# Patient Record
Sex: FEMALE | Race: BLACK OR AFRICAN AMERICAN | Employment: OTHER | URBAN - METROPOLITAN AREA
[De-identification: names, ages, dates, MRNs, and addresses within clinical notes are randomized per-mention and may not be internally consistent; named-entity substitution may affect disease eponyms.]

---

## 2017-05-25 PROBLEM — S93.402A SPRAIN OF LEFT ANKLE: Status: ACTIVE | Noted: 2017-05-25

## 2017-11-10 PROBLEM — G44.209 MUSCLE CONTRACTION HEADACHE: Status: ACTIVE | Noted: 2017-11-10

## 2018-03-23 ENCOUNTER — OFFICE VISIT (OUTPATIENT)
Dept: FAMILY MEDICINE CLINIC | Age: 63
End: 2018-03-23
Payer: MEDICARE

## 2018-03-23 VITALS
TEMPERATURE: 97.7 F | HEIGHT: 65 IN | WEIGHT: 101 LBS | OXYGEN SATURATION: 99 % | DIASTOLIC BLOOD PRESSURE: 78 MMHG | BODY MASS INDEX: 16.83 KG/M2 | SYSTOLIC BLOOD PRESSURE: 110 MMHG | HEART RATE: 70 BPM

## 2018-03-23 DIAGNOSIS — G47.00 INSOMNIA, UNSPECIFIED TYPE: ICD-10-CM

## 2018-03-23 DIAGNOSIS — Z63.4 BEREAVEMENT: ICD-10-CM

## 2018-03-23 DIAGNOSIS — E03.9 HYPOTHYROIDISM, UNSPECIFIED TYPE: ICD-10-CM

## 2018-03-23 DIAGNOSIS — J45.30 MILD PERSISTENT ASTHMA WITHOUT COMPLICATION: Primary | Chronic | ICD-10-CM

## 2018-03-23 PROCEDURE — G8599 NO ASA/ANTIPLAT THER USE RNG: HCPCS | Performed by: FAMILY MEDICINE

## 2018-03-23 PROCEDURE — 3017F COLORECTAL CA SCREEN DOC REV: CPT | Performed by: FAMILY MEDICINE

## 2018-03-23 PROCEDURE — G8419 CALC BMI OUT NRM PARAM NOF/U: HCPCS | Performed by: FAMILY MEDICINE

## 2018-03-23 PROCEDURE — G8427 DOCREV CUR MEDS BY ELIG CLIN: HCPCS | Performed by: FAMILY MEDICINE

## 2018-03-23 PROCEDURE — 99213 OFFICE O/P EST LOW 20 MIN: CPT | Performed by: FAMILY MEDICINE

## 2018-03-23 PROCEDURE — 1036F TOBACCO NON-USER: CPT | Performed by: FAMILY MEDICINE

## 2018-03-23 PROCEDURE — 3014F SCREEN MAMMO DOC REV: CPT | Performed by: FAMILY MEDICINE

## 2018-03-23 PROCEDURE — G8482 FLU IMMUNIZE ORDER/ADMIN: HCPCS | Performed by: FAMILY MEDICINE

## 2018-03-23 RX ORDER — UREA 10 %
1 LOTION (ML) TOPICAL NIGHTLY PRN
Qty: 30 TABLET | Refills: 1 | Status: SHIPPED | OUTPATIENT
Start: 2018-03-23 | End: 2018-05-26 | Stop reason: SDUPTHER

## 2018-03-23 RX ORDER — FLUTICASONE PROPIONATE 110 UG/1
1 AEROSOL, METERED RESPIRATORY (INHALATION) 2 TIMES DAILY
Qty: 1 INHALER | Refills: 3 | Status: SHIPPED | OUTPATIENT
Start: 2018-03-23 | End: 2018-07-24 | Stop reason: SDUPTHER

## 2018-03-23 SDOH — SOCIAL STABILITY - SOCIAL INSECURITY: DISSAPEARANCE AND DEATH OF FAMILY MEMBER: Z63.4

## 2018-03-23 ASSESSMENT — ENCOUNTER SYMPTOMS
COUGH: 0
WHEEZING: 0
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
## 2018-04-09 DIAGNOSIS — E03.0 CONGENITAL HYPOTHYROIDISM WITH DIFFUSE GOITER: ICD-10-CM

## 2018-04-10 RX ORDER — LEVOTHYROXINE SODIUM 0.05 MG/1
TABLET ORAL
Qty: 90 TABLET | Refills: 1 | Status: SHIPPED | OUTPATIENT
Start: 2018-04-10 | End: 2018-11-16 | Stop reason: SDUPTHER

## 2018-05-26 DIAGNOSIS — G47.00 INSOMNIA, UNSPECIFIED TYPE: ICD-10-CM

## 2018-05-29 RX ORDER — UREA 10 %
1 LOTION (ML) TOPICAL NIGHTLY PRN
Qty: 30 TABLET | Refills: 1 | Status: SHIPPED | OUTPATIENT
Start: 2018-05-29 | End: 2018-11-16 | Stop reason: SDUPTHER

## 2018-07-24 ENCOUNTER — OFFICE VISIT (OUTPATIENT)
Dept: FAMILY MEDICINE CLINIC | Age: 63
End: 2018-07-24
Payer: MEDICARE

## 2018-07-24 VITALS
SYSTOLIC BLOOD PRESSURE: 110 MMHG | HEIGHT: 64 IN | HEART RATE: 70 BPM | DIASTOLIC BLOOD PRESSURE: 60 MMHG | BODY MASS INDEX: 18.27 KG/M2 | OXYGEN SATURATION: 99 % | WEIGHT: 107 LBS

## 2018-07-24 DIAGNOSIS — Z12.31 ENCOUNTER FOR SCREENING MAMMOGRAM FOR BREAST CANCER: ICD-10-CM

## 2018-07-24 DIAGNOSIS — G43.109 MIGRAINE WITH AURA AND WITHOUT STATUS MIGRAINOSUS, NOT INTRACTABLE: Chronic | ICD-10-CM

## 2018-07-24 DIAGNOSIS — G47.00 INSOMNIA, UNSPECIFIED TYPE: Primary | ICD-10-CM

## 2018-07-24 DIAGNOSIS — F32.A DEPRESSION, UNSPECIFIED DEPRESSION TYPE: ICD-10-CM

## 2018-07-24 DIAGNOSIS — J45.30 MILD PERSISTENT ASTHMA WITHOUT COMPLICATION: Chronic | ICD-10-CM

## 2018-07-24 PROCEDURE — 3017F COLORECTAL CA SCREEN DOC REV: CPT | Performed by: FAMILY MEDICINE

## 2018-07-24 PROCEDURE — 99213 OFFICE O/P EST LOW 20 MIN: CPT | Performed by: FAMILY MEDICINE

## 2018-07-24 PROCEDURE — G8427 DOCREV CUR MEDS BY ELIG CLIN: HCPCS | Performed by: FAMILY MEDICINE

## 2018-07-24 PROCEDURE — G8599 NO ASA/ANTIPLAT THER USE RNG: HCPCS | Performed by: FAMILY MEDICINE

## 2018-07-24 PROCEDURE — 1036F TOBACCO NON-USER: CPT | Performed by: FAMILY MEDICINE

## 2018-07-24 PROCEDURE — G8419 CALC BMI OUT NRM PARAM NOF/U: HCPCS | Performed by: FAMILY MEDICINE

## 2018-07-24 RX ORDER — MIRTAZAPINE 7.5 MG/1
7.5 TABLET, FILM COATED ORAL NIGHTLY
Qty: 30 TABLET | Refills: 2 | Status: SHIPPED | OUTPATIENT
Start: 2018-07-24 | End: 2018-10-13 | Stop reason: SDUPTHER

## 2018-07-24 RX ORDER — FLUTICASONE PROPIONATE 110 UG/1
1 AEROSOL, METERED RESPIRATORY (INHALATION) 2 TIMES DAILY
Qty: 1 INHALER | Refills: 3 | Status: SHIPPED | OUTPATIENT
Start: 2018-07-24 | End: 2019-02-19 | Stop reason: SDUPTHER

## 2018-07-24 RX ORDER — ESCITALOPRAM OXALATE 10 MG/1
TABLET ORAL
Qty: 90 TABLET | Refills: 0 | Status: SHIPPED | OUTPATIENT
Start: 2018-07-24 | End: 2018-11-16 | Stop reason: SDUPTHER

## 2018-07-24 ASSESSMENT — ENCOUNTER SYMPTOMS
WHEEZING: 0
HEMOPTYSIS: 0
SPUTUM PRODUCTION: 0
SHORTNESS OF BREATH: 0
COUGH: 0

## 2018-07-24 NOTE — PROGRESS NOTES
18.37 kg/m²   Physical Exam   Constitutional: She is oriented to person, place, and time. She appears well-developed and well-nourished. HENT:   Head: Normocephalic and atraumatic. Cardiovascular: Normal rate and regular rhythm. Exam reveals no gallop and no friction rub. No murmur heard. Pulmonary/Chest: Effort normal and breath sounds normal. She has no wheezes. She has no rales. Musculoskeletal: She exhibits no edema. Neurological: She is alert and oriented to person, place, and time. Skin: Skin is warm and dry. Assessment/Plan:  Frieda Spencer was seen today for asthma, insomnia and headache. Diagnoses and all orders for this visit:    Insomnia, unspecified type  Trial of remeron  May help with weight as well  -     mirtazapine (REMERON) 7.5 MG tablet; Take 1 tablet by mouth nightly For insomnia    Encounter for screening mammogram for breast cancer  -     GERONIMO DIGITAL SCREEN W CAD BILATERAL; Future    Depression, unspecified depression type    Stable   Refill lexapro  -     escitalopram (LEXAPRO) 10 MG tablet; TAKE 1 TABLET BY MOUTH DAILY    Migraine with aura and without status migrainosus, not intractable  Needs refreral to new neurologist for management of migraines  On tylenol 4 and fioricet  -     escitalopram (LEXAPRO) 10 MG tablet; TAKE 1 TABLET BY MOUTH DAILY  -     [facility]- Shantel MCGREGOR    Mild persistent asthma without complication  stable  -     fluticasone (FLOVENT HFA) 110 MCG/ACT inhaler; Inhale 1 puff into the lungs 2 times daily      Return in about 3 months (around 10/24/2018) for f/u insomnia, asthma. Advised patient to call with any new medication issues. All questions answered.   Call or go to emergency department if symptoms worsen or persist.

## 2018-07-25 ENCOUNTER — TELEPHONE (OUTPATIENT)
Dept: NEUROLOGY | Age: 63
End: 2018-07-25

## 2018-07-25 NOTE — TELEPHONE ENCOUNTER
Called and scheduled patient from referral, 11- @ 8:20a.m. Patient confirmed date and time,  told patient to bring insurance card, photo id and copay is due at the time of appointment. Reminder letter sent with the address, date and time of appt.

## 2018-07-30 ENCOUNTER — HOSPITAL ENCOUNTER (OUTPATIENT)
Dept: GENERAL RADIOLOGY | Age: 63
Discharge: HOME | End: 2018-08-01
Payer: MEDICARE

## 2018-07-30 DIAGNOSIS — Z12.31 ENCOUNTER FOR SCREENING MAMMOGRAM FOR BREAST CANCER: ICD-10-CM

## 2018-07-30 PROCEDURE — 77063 BREAST TOMOSYNTHESIS BI: CPT

## 2018-10-13 DIAGNOSIS — G47.00 INSOMNIA, UNSPECIFIED TYPE: ICD-10-CM

## 2018-10-17 RX ORDER — MIRTAZAPINE 7.5 MG/1
7.5 TABLET, FILM COATED ORAL NIGHTLY
Qty: 30 TABLET | Refills: 2 | Status: SHIPPED | OUTPATIENT
Start: 2018-10-17 | End: 2020-08-28 | Stop reason: SDUPTHER

## 2018-11-16 ENCOUNTER — OFFICE VISIT (OUTPATIENT)
Dept: FAMILY MEDICINE CLINIC | Age: 63
End: 2018-11-16
Payer: MEDICARE

## 2018-11-16 VITALS
HEART RATE: 76 BPM | BODY MASS INDEX: 17.66 KG/M2 | TEMPERATURE: 97.6 F | DIASTOLIC BLOOD PRESSURE: 82 MMHG | HEIGHT: 65 IN | SYSTOLIC BLOOD PRESSURE: 128 MMHG | WEIGHT: 106 LBS | OXYGEN SATURATION: 99 %

## 2018-11-16 DIAGNOSIS — G43.109 MIGRAINE WITH AURA AND WITHOUT STATUS MIGRAINOSUS, NOT INTRACTABLE: Chronic | ICD-10-CM

## 2018-11-16 DIAGNOSIS — F32.A DEPRESSION, UNSPECIFIED DEPRESSION TYPE: ICD-10-CM

## 2018-11-16 DIAGNOSIS — Z23 NEED FOR INFLUENZA VACCINATION: ICD-10-CM

## 2018-11-16 DIAGNOSIS — G47.00 INSOMNIA, UNSPECIFIED TYPE: ICD-10-CM

## 2018-11-16 DIAGNOSIS — R07.9 CHEST PAIN IN ADULT: ICD-10-CM

## 2018-11-16 DIAGNOSIS — M79.10 MYALGIA, UNSPECIFIED SITE: Primary | ICD-10-CM

## 2018-11-16 DIAGNOSIS — E03.0 CONGENITAL HYPOTHYROIDISM WITH DIFFUSE GOITER: ICD-10-CM

## 2018-11-16 PROCEDURE — G0008 ADMIN INFLUENZA VIRUS VAC: HCPCS | Performed by: FAMILY MEDICINE

## 2018-11-16 PROCEDURE — G8427 DOCREV CUR MEDS BY ELIG CLIN: HCPCS | Performed by: FAMILY MEDICINE

## 2018-11-16 PROCEDURE — 1036F TOBACCO NON-USER: CPT | Performed by: FAMILY MEDICINE

## 2018-11-16 PROCEDURE — G8599 NO ASA/ANTIPLAT THER USE RNG: HCPCS | Performed by: FAMILY MEDICINE

## 2018-11-16 PROCEDURE — 93000 ELECTROCARDIOGRAM COMPLETE: CPT | Performed by: FAMILY MEDICINE

## 2018-11-16 PROCEDURE — 90686 IIV4 VACC NO PRSV 0.5 ML IM: CPT | Performed by: FAMILY MEDICINE

## 2018-11-16 PROCEDURE — G8419 CALC BMI OUT NRM PARAM NOF/U: HCPCS | Performed by: FAMILY MEDICINE

## 2018-11-16 PROCEDURE — G8482 FLU IMMUNIZE ORDER/ADMIN: HCPCS | Performed by: FAMILY MEDICINE

## 2018-11-16 PROCEDURE — 3017F COLORECTAL CA SCREEN DOC REV: CPT | Performed by: FAMILY MEDICINE

## 2018-11-16 PROCEDURE — 99213 OFFICE O/P EST LOW 20 MIN: CPT | Performed by: FAMILY MEDICINE

## 2018-11-16 RX ORDER — ALBUTEROL SULFATE 90 UG/1
AEROSOL, METERED RESPIRATORY (INHALATION)
COMMUNITY
Start: 2015-08-13 | End: 2018-11-29

## 2018-11-16 RX ORDER — CYANOCOBALAMIN 1000 UG/ML
INJECTION INTRAMUSCULAR; SUBCUTANEOUS
COMMUNITY

## 2018-11-16 RX ORDER — LEVOTHYROXINE SODIUM 0.05 MG/1
TABLET ORAL
Qty: 90 TABLET | Refills: 1 | Status: SHIPPED | OUTPATIENT
Start: 2018-11-16 | End: 2019-02-19 | Stop reason: SDUPTHER

## 2018-11-16 RX ORDER — ESCITALOPRAM OXALATE 10 MG/1
TABLET ORAL
Qty: 90 TABLET | Refills: 0 | Status: SHIPPED | OUTPATIENT
Start: 2018-11-16 | End: 2019-03-31 | Stop reason: SDUPTHER

## 2018-11-16 RX ORDER — UREA 10 %
1 LOTION (ML) TOPICAL NIGHTLY PRN
Qty: 30 TABLET | Refills: 1 | Status: SHIPPED | OUTPATIENT
Start: 2018-11-16 | End: 2019-02-19
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
## 2018-11-29 ENCOUNTER — OFFICE VISIT (OUTPATIENT)
Dept: NEUROLOGY | Age: 63
End: 2018-11-29
Payer: MEDICARE

## 2018-11-29 VITALS
DIASTOLIC BLOOD PRESSURE: 79 MMHG | HEIGHT: 64 IN | HEART RATE: 79 BPM | RESPIRATION RATE: 12 BRPM | WEIGHT: 104 LBS | OXYGEN SATURATION: 96 % | SYSTOLIC BLOOD PRESSURE: 125 MMHG | TEMPERATURE: 97.3 F | BODY MASS INDEX: 17.75 KG/M2

## 2018-11-29 DIAGNOSIS — G43.009 MIGRAINE WITHOUT AURA AND WITHOUT STATUS MIGRAINOSUS, NOT INTRACTABLE: Primary | ICD-10-CM

## 2018-11-29 PROBLEM — S93.402A SPRAIN OF LEFT ANKLE: Status: RESOLVED | Noted: 2017-05-25 | Resolved: 2018-11-29

## 2018-11-29 PROCEDURE — 99204 OFFICE O/P NEW MOD 45 MIN: CPT | Performed by: NURSE PRACTITIONER

## 2018-11-29 PROCEDURE — G8419 CALC BMI OUT NRM PARAM NOF/U: HCPCS | Performed by: NURSE PRACTITIONER

## 2018-11-29 PROCEDURE — G8482 FLU IMMUNIZE ORDER/ADMIN: HCPCS | Performed by: NURSE PRACTITIONER

## 2018-11-29 PROCEDURE — G8599 NO ASA/ANTIPLAT THER USE RNG: HCPCS | Performed by: NURSE PRACTITIONER

## 2018-11-29 PROCEDURE — 3017F COLORECTAL CA SCREEN DOC REV: CPT | Performed by: NURSE PRACTITIONER

## 2018-11-29 PROCEDURE — G8427 DOCREV CUR MEDS BY ELIG CLIN: HCPCS | Performed by: NURSE PRACTITIONER

## 2018-11-29 PROCEDURE — 1036F TOBACCO NON-USER: CPT | Performed by: NURSE PRACTITIONER
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
## 2018-12-03 DIAGNOSIS — G43.109 MIGRAINE WITH AURA AND WITHOUT STATUS MIGRAINOSUS, NOT INTRACTABLE: Chronic | ICD-10-CM

## 2018-12-03 NOTE — TELEPHONE ENCOUNTER
Requested Prescriptions     Pending Prescriptions Disp Refills    vitamin D ([facility] D3) 1000 units CAPS 30 capsule 9       Next appt is 1/25/2019  Last appt was 11/16/2018

## 2019-01-04 DIAGNOSIS — G43.101 MIGRAINE WITH AURA AND WITH STATUS MIGRAINOSUS, NOT INTRACTABLE: ICD-10-CM

## 2019-01-04 RX ORDER — BUTALBITAL, ASPIRIN, AND CAFFEINE 325; 50; 40 MG/1; MG/1; MG/1
1 CAPSULE ORAL 2 TIMES DAILY PRN
Qty: 60 CAPSULE | Refills: 5 | Status: SHIPPED | OUTPATIENT
Start: 2019-01-04 | End: 2019-08-05 | Stop reason: SDUPTHER

## 2019-02-19 ENCOUNTER — OFFICE VISIT (OUTPATIENT)
Dept: FAMILY MEDICINE CLINIC | Age: 64
End: 2019-02-19
Payer: MEDICARE

## 2019-02-19 ENCOUNTER — HOSPITAL ENCOUNTER (OUTPATIENT)
Age: 64
Discharge: HOME | End: 2019-02-21
Payer: MEDICARE

## 2019-02-19 VITALS
TEMPERATURE: 97.9 F | BODY MASS INDEX: 19.49 KG/M2 | HEART RATE: 68 BPM | SYSTOLIC BLOOD PRESSURE: 126 MMHG | RESPIRATION RATE: 16 BRPM | HEIGHT: 63 IN | DIASTOLIC BLOOD PRESSURE: 70 MMHG | OXYGEN SATURATION: 98 % | WEIGHT: 110 LBS

## 2019-02-19 DIAGNOSIS — E03.0 CONGENITAL HYPOTHYROIDISM WITH DIFFUSE GOITER: ICD-10-CM

## 2019-02-19 DIAGNOSIS — E78.2 MIXED HYPERLIPIDEMIA: ICD-10-CM

## 2019-02-19 DIAGNOSIS — E03.9 HYPOTHYROIDISM, UNSPECIFIED TYPE: ICD-10-CM

## 2019-02-19 DIAGNOSIS — F32.A DEPRESSION, UNSPECIFIED DEPRESSION TYPE: ICD-10-CM

## 2019-02-19 DIAGNOSIS — J45.30 MILD PERSISTENT ASTHMA WITHOUT COMPLICATION: ICD-10-CM

## 2019-02-19 DIAGNOSIS — E03.9 HYPOTHYROIDISM, UNSPECIFIED TYPE: Primary | ICD-10-CM

## 2019-02-19 DIAGNOSIS — G43.109 MIGRAINE WITH AURA AND WITHOUT STATUS MIGRAINOSUS, NOT INTRACTABLE: Chronic | ICD-10-CM

## 2019-02-19 LAB
ALBUMIN SERPL-MCNC: 4.3 G/DL (ref 3.5–5.2)
ALP BLD-CCNC: 75 U/L (ref 35–104)
ALT SERPL-CCNC: 25 U/L (ref 0–32)
ANION GAP SERPL CALCULATED.3IONS-SCNC: 9 MMOL/L (ref 7–16)
AST SERPL-CCNC: 25 U/L (ref 0–31)
BASOPHILS ABSOLUTE: 0 /ΜL
BASOPHILS ABSOLUTE: 0.03 E9/L (ref 0–0.2)
BASOPHILS RELATIVE PERCENT: 0.8 % (ref 0–2)
BASOPHILS RELATIVE PERCENT: 1 %
BILIRUB SERPL-MCNC: <0.2 MG/DL (ref 0–1.2)
BUN BLDV-MCNC: 9 MG/DL (ref 8–23)
CALCIUM SERPL-MCNC: 9.2 MG/DL (ref 8.6–10.2)
CHLORIDE BLD-SCNC: 102 MMOL/L (ref 98–107)
CHOLESTEROL, TOTAL: 204 MG/DL (ref 0–199)
CO2: 30 MMOL/L (ref 22–29)
CREAT SERPL-MCNC: 0.6 MG/DL (ref 0.5–1)
EOSINOPHILS ABSOLUTE: 0.16 E9/L (ref 0.05–0.5)
EOSINOPHILS ABSOLUTE: 100 /ΜL
EOSINOPHILS RELATIVE PERCENT: 2 %
EOSINOPHILS RELATIVE PERCENT: 4.2 % (ref 0–6)
GFR AFRICAN AMERICAN: >60
GFR NON-AFRICAN AMERICAN: >60 ML/MIN/1.73
GLUCOSE BLD-MCNC: 81 MG/DL (ref 74–99)
HCT VFR BLD CALC: 39.1 % (ref 34–48)
HCT VFR BLD CALC: 42.5 % (ref 36–46)
HDLC SERPL-MCNC: 81 MG/DL
HEMOGLOBIN: 12.7 G/DL (ref 11.5–15.5)
HEMOGLOBIN: 14.1 G/DL (ref 12–16)
IMMATURE GRANULOCYTES #: 0.01 E9/L
IMMATURE GRANULOCYTES %: 0.3 % (ref 0–5)
LDL CHOLESTEROL CALCULATED: 110 MG/DL (ref 0–99)
LYMPHOCYTES ABSOLUTE: 1.25 E9/L (ref 1.5–4)
LYMPHOCYTES ABSOLUTE: 1500 /ΜL
LYMPHOCYTES RELATIVE PERCENT: 25 %
LYMPHOCYTES RELATIVE PERCENT: 33.2 % (ref 20–42)
MCH RBC QN AUTO: 27.3 PG (ref 26–35)
MCH RBC QN AUTO: 27.7 PG
MCHC RBC AUTO-ENTMCNC: 32.5 % (ref 32–34.5)
MCHC RBC AUTO-ENTMCNC: 331 G/DL
MCV RBC AUTO: 83.8 FL
MCV RBC AUTO: 83.9 FL (ref 80–99.9)
MONOCYTES ABSOLUTE: 0.41 E9/L (ref 0.1–0.95)
MONOCYTES ABSOLUTE: 400 /ΜL
MONOCYTES RELATIVE PERCENT: 10.9 % (ref 2–12)
MONOCYTES RELATIVE PERCENT: 6 %
NEUTROPHILS ABSOLUTE: 1.91 E9/L (ref 1.8–7.3)
NEUTROPHILS ABSOLUTE: 3900 /ΜL
NEUTROPHILS RELATIVE PERCENT: 50.6 % (ref 43–80)
NEUTROPHILS RELATIVE PERCENT: 66 %
PDW BLD-RTO: 13.6 FL (ref 11.5–15)
PLATELET # BLD: 198 E9/L (ref 130–450)
PLATELET # BLD: 220 K/ΜL
PMV BLD AUTO: 10.8 FL (ref 7–12)
PMV BLD AUTO: 8 FL
POTASSIUM SERPL-SCNC: 4.4 MMOL/L (ref 3.5–5)
RBC # BLD: 4.66 E12/L (ref 3.5–5.5)
RBC # BLD: 5.08 10^6/ΜL
SODIUM BLD-SCNC: 141 MMOL/L (ref 132–146)
TOTAL PROTEIN: 7.5 G/DL (ref 6.4–8.3)
TRIGL SERPL-MCNC: 63 MG/DL (ref 0–149)
TSH SERPL DL<=0.05 MIU/L-ACNC: 3.05 UIU/ML (ref 0.27–4.2)
VLDLC SERPL CALC-MCNC: 13 MG/DL
WBC # BLD: 3.8 E9/L (ref 4.5–11.5)
WBC # BLD: 6 10^3/ML

## 2019-02-19 PROCEDURE — 85025 COMPLETE CBC W/AUTO DIFF WBC: CPT

## 2019-02-19 PROCEDURE — G8482 FLU IMMUNIZE ORDER/ADMIN: HCPCS | Performed by: FAMILY MEDICINE

## 2019-02-19 PROCEDURE — G8599 NO ASA/ANTIPLAT THER USE RNG: HCPCS | Performed by: FAMILY MEDICINE

## 2019-02-19 PROCEDURE — 84443 ASSAY THYROID STIM HORMONE: CPT

## 2019-02-19 PROCEDURE — G8420 CALC BMI NORM PARAMETERS: HCPCS | Performed by: FAMILY MEDICINE

## 2019-02-19 PROCEDURE — 80053 COMPREHEN METABOLIC PANEL: CPT

## 2019-02-19 PROCEDURE — 99213 OFFICE O/P EST LOW 20 MIN: CPT | Performed by: FAMILY MEDICINE

## 2019-02-19 PROCEDURE — 36415 COLL VENOUS BLD VENIPUNCTURE: CPT | Performed by: FAMILY MEDICINE

## 2019-02-19 PROCEDURE — 80061 LIPID PANEL: CPT

## 2019-02-19 PROCEDURE — 3017F COLORECTAL CA SCREEN DOC REV: CPT | Performed by: FAMILY MEDICINE

## 2019-02-19 PROCEDURE — 1036F TOBACCO NON-USER: CPT | Performed by: FAMILY MEDICINE

## 2019-02-19 PROCEDURE — G8427 DOCREV CUR MEDS BY ELIG CLIN: HCPCS | Performed by: FAMILY MEDICINE

## 2019-02-19 RX ORDER — FLUTICASONE PROPIONATE 110 UG/1
1 AEROSOL, METERED RESPIRATORY (INHALATION) 2 TIMES DAILY
Qty: 1 INHALER | Refills: 5 | Status: SHIPPED | OUTPATIENT
Start: 2019-02-19 | End: 2020-11-20 | Stop reason: SDUPTHER

## 2019-02-19 RX ORDER — LEVOTHYROXINE SODIUM 0.05 MG/1
TABLET ORAL
Qty: 90 TABLET | Refills: 3 | Status: SHIPPED | OUTPATIENT
Start: 2019-02-19 | End: 2020-03-17

## 2019-02-19 ASSESSMENT — ENCOUNTER SYMPTOMS
COUGH: 0
WHEEZING: 0

## 2019-03-31 DIAGNOSIS — F32.A DEPRESSION, UNSPECIFIED DEPRESSION TYPE: ICD-10-CM

## 2019-03-31 DIAGNOSIS — G43.109 MIGRAINE WITH AURA AND WITHOUT STATUS MIGRAINOSUS, NOT INTRACTABLE: Chronic | ICD-10-CM

## 2019-04-02 RX ORDER — ESCITALOPRAM OXALATE 10 MG/1
TABLET ORAL
Qty: 90 TABLET | Refills: 1 | Status: SHIPPED | OUTPATIENT
Start: 2019-04-02 | End: 2020-05-29 | Stop reason: SDUPTHER

## 2019-05-28 ENCOUNTER — OFFICE VISIT (OUTPATIENT)
Dept: FAMILY MEDICINE CLINIC | Age: 64
End: 2019-05-28
Payer: MEDICARE

## 2019-05-28 VITALS
HEART RATE: 68 BPM | SYSTOLIC BLOOD PRESSURE: 98 MMHG | RESPIRATION RATE: 16 BRPM | DIASTOLIC BLOOD PRESSURE: 72 MMHG | HEIGHT: 65 IN | BODY MASS INDEX: 18.83 KG/M2 | WEIGHT: 113 LBS | OXYGEN SATURATION: 99 %

## 2019-05-28 DIAGNOSIS — G43.109 MIGRAINE WITH AURA AND WITHOUT STATUS MIGRAINOSUS, NOT INTRACTABLE: Chronic | ICD-10-CM

## 2019-05-28 DIAGNOSIS — E03.9 HYPOTHYROIDISM, UNSPECIFIED TYPE: ICD-10-CM

## 2019-05-28 DIAGNOSIS — J45.30 MILD PERSISTENT ASTHMA WITHOUT COMPLICATION: Primary | ICD-10-CM

## 2019-05-28 PROCEDURE — 3017F COLORECTAL CA SCREEN DOC REV: CPT | Performed by: FAMILY MEDICINE

## 2019-05-28 PROCEDURE — G8420 CALC BMI NORM PARAMETERS: HCPCS | Performed by: FAMILY MEDICINE

## 2019-05-28 PROCEDURE — G8599 NO ASA/ANTIPLAT THER USE RNG: HCPCS | Performed by: FAMILY MEDICINE

## 2019-05-28 PROCEDURE — 99213 OFFICE O/P EST LOW 20 MIN: CPT | Performed by: FAMILY MEDICINE

## 2019-05-28 PROCEDURE — 1036F TOBACCO NON-USER: CPT | Performed by: FAMILY MEDICINE

## 2019-05-28 PROCEDURE — G8427 DOCREV CUR MEDS BY ELIG CLIN: HCPCS | Performed by: FAMILY MEDICINE

## 2019-05-28 RX ORDER — AMITRIPTYLINE HYDROCHLORIDE 25 MG/1
12.5 TABLET, FILM COATED ORAL NIGHTLY PRN
Refills: 2 | COMMUNITY
Start: 2019-04-11 | End: 2021-03-25 | Stop reason: CLARIF

## 2019-05-28 RX ORDER — ALBUTEROL SULFATE 90 UG/1
2 AEROSOL, METERED RESPIRATORY (INHALATION) EVERY 6 HOURS PRN
Qty: 1 INHALER | Refills: 0 | Status: SHIPPED | OUTPATIENT
Start: 2019-05-28 | End: 2020-11-20 | Stop reason: SDUPTHER

## 2019-05-28 ASSESSMENT — ENCOUNTER SYMPTOMS
WHEEZING: 0
COUGH: 1

## 2019-05-28 ASSESSMENT — PATIENT HEALTH QUESTIONNAIRE - PHQ9
2. FEELING DOWN, DEPRESSED OR HOPELESS: 0
SUM OF ALL RESPONSES TO PHQ QUESTIONS 1-9: 0
SUM OF ALL RESPONSES TO PHQ QUESTIONS 1-9: 0
SUM OF ALL RESPONSES TO PHQ9 QUESTIONS 1 & 2: 0
1. LITTLE INTEREST OR PLEASURE IN DOING THINGS: 0
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
## 2019-08-05 DIAGNOSIS — G43.101 MIGRAINE WITH AURA AND WITH STATUS MIGRAINOSUS, NOT INTRACTABLE: ICD-10-CM

## 2019-08-05 RX ORDER — BUTALBITAL, ASPIRIN, AND CAFFEINE 325; 50; 40 MG/1; MG/1; MG/1
1 CAPSULE ORAL 2 TIMES DAILY PRN
Qty: 60 CAPSULE | Refills: 0 | Status: SHIPPED | OUTPATIENT
Start: 2019-08-05 | End: 2019-11-27 | Stop reason: SDUPTHER

## 2019-08-09 ENCOUNTER — TELEPHONE (OUTPATIENT)
Dept: FAMILY MEDICINE CLINIC | Age: 64
End: 2019-08-09

## 2019-08-09 DIAGNOSIS — Z12.39 SCREENING FOR MALIGNANT NEOPLASM OF BREAST: Primary | ICD-10-CM

## 2019-08-14 ENCOUNTER — HOSPITAL ENCOUNTER (OUTPATIENT)
Dept: GENERAL RADIOLOGY | Age: 64
Discharge: HOME | End: 2019-08-16
Payer: MEDICARE

## 2019-08-14 DIAGNOSIS — Z12.39 SCREENING FOR MALIGNANT NEOPLASM OF BREAST: ICD-10-CM

## 2019-08-14 PROCEDURE — 77067 SCR MAMMO BI INCL CAD: CPT

## 2019-11-23 DIAGNOSIS — G43.101 MIGRAINE WITH AURA AND WITH STATUS MIGRAINOSUS, NOT INTRACTABLE: ICD-10-CM

## 2019-11-25 RX ORDER — BUTALBITAL, ASPIRIN, AND CAFFEINE 325; 50; 40 MG/1; MG/1; MG/1
1 CAPSULE ORAL 2 TIMES DAILY PRN
Qty: 60 CAPSULE | Refills: 0 | OUTPATIENT
Start: 2019-11-25 | End: 2019-12-25

## 2019-11-27 ENCOUNTER — OFFICE VISIT (OUTPATIENT)
Dept: NEUROLOGY | Age: 64
End: 2019-11-27
Payer: MEDICARE

## 2019-11-27 VITALS
TEMPERATURE: 97.5 F | HEIGHT: 65 IN | OXYGEN SATURATION: 97 % | WEIGHT: 102.9 LBS | HEART RATE: 78 BPM | DIASTOLIC BLOOD PRESSURE: 86 MMHG | SYSTOLIC BLOOD PRESSURE: 150 MMHG | RESPIRATION RATE: 12 BRPM | BODY MASS INDEX: 17.14 KG/M2

## 2019-11-27 DIAGNOSIS — Z87.820 HX OF TRAUMATIC BRAIN INJURY: ICD-10-CM

## 2019-11-27 DIAGNOSIS — G43.101 MIGRAINE WITH AURA AND WITH STATUS MIGRAINOSUS, NOT INTRACTABLE: Primary | ICD-10-CM

## 2019-11-27 PROCEDURE — G8419 CALC BMI OUT NRM PARAM NOF/U: HCPCS | Performed by: NURSE PRACTITIONER

## 2019-11-27 PROCEDURE — 99214 OFFICE O/P EST MOD 30 MIN: CPT | Performed by: NURSE PRACTITIONER

## 2019-11-27 PROCEDURE — G8599 NO ASA/ANTIPLAT THER USE RNG: HCPCS | Performed by: NURSE PRACTITIONER

## 2019-11-27 PROCEDURE — 1036F TOBACCO NON-USER: CPT | Performed by: NURSE PRACTITIONER

## 2019-11-27 PROCEDURE — G8427 DOCREV CUR MEDS BY ELIG CLIN: HCPCS | Performed by: NURSE PRACTITIONER

## 2019-11-27 PROCEDURE — 3017F COLORECTAL CA SCREEN DOC REV: CPT | Performed by: NURSE PRACTITIONER

## 2019-11-27 PROCEDURE — G8484 FLU IMMUNIZE NO ADMIN: HCPCS | Performed by: NURSE PRACTITIONER

## 2019-11-27 RX ORDER — BUTALBITAL, ASPIRIN, AND CAFFEINE 325; 50; 40 MG/1; MG/1; MG/1
1 CAPSULE ORAL 2 TIMES DAILY PRN
Qty: 60 CAPSULE | Refills: 0 | Status: SHIPPED | OUTPATIENT
Start: 2019-11-27 | End: 2020-02-24 | Stop reason: SDUPTHER

## 2019-11-29 DIAGNOSIS — G43.109 MIGRAINE WITH AURA AND WITHOUT STATUS MIGRAINOSUS, NOT INTRACTABLE: Chronic | ICD-10-CM

## 2019-12-01 RX ORDER — MULTIVITAMIN WITH FOLIC ACID 400 MCG
TABLET ORAL
Qty: 30 TABLET | Refills: 11 | Status: SHIPPED | OUTPATIENT
Start: 2019-12-01 | End: 2021-01-19

## 2019-12-16 DIAGNOSIS — G43.109 MIGRAINE WITH AURA AND WITHOUT STATUS MIGRAINOSUS, NOT INTRACTABLE: Chronic | ICD-10-CM

## 2020-02-21 RX ORDER — BUTALBITAL, ASPIRIN, AND CAFFEINE 325; 50; 40 MG/1; MG/1; MG/1
1 CAPSULE ORAL 2 TIMES DAILY PRN
Qty: 60 CAPSULE | Refills: 0 | Status: CANCELLED | OUTPATIENT
Start: 2020-02-21 | End: 2020-03-22

## 2020-02-21 NOTE — TELEPHONE ENCOUNTER
Pharmacy requesting refill for pt. Unable to schedule 1 year follow up bc schedule is not available yet.   Electronically signed by Hunter Horowitz on 2/21/20 at 8:29 AM

## 2020-02-24 RX ORDER — BUTALBITAL, ASPIRIN, AND CAFFEINE 325; 50; 40 MG/1; MG/1; MG/1
1 CAPSULE ORAL 2 TIMES DAILY PRN
Qty: 60 CAPSULE | Refills: 0 | Status: SHIPPED
Start: 2020-02-24 | End: 2020-06-08

## 2020-02-28 ENCOUNTER — OFFICE VISIT (OUTPATIENT)
Dept: FAMILY MEDICINE CLINIC | Age: 65
End: 2020-02-28
Payer: MEDICARE

## 2020-02-28 VITALS
HEIGHT: 64 IN | HEART RATE: 71 BPM | SYSTOLIC BLOOD PRESSURE: 120 MMHG | DIASTOLIC BLOOD PRESSURE: 70 MMHG | WEIGHT: 107.8 LBS | BODY MASS INDEX: 18.4 KG/M2

## 2020-02-28 PROCEDURE — 3017F COLORECTAL CA SCREEN DOC REV: CPT | Performed by: FAMILY MEDICINE

## 2020-02-28 PROCEDURE — G0008 ADMIN INFLUENZA VIRUS VAC: HCPCS | Performed by: FAMILY MEDICINE

## 2020-02-28 PROCEDURE — 90686 IIV4 VACC NO PRSV 0.5 ML IM: CPT | Performed by: FAMILY MEDICINE

## 2020-02-28 PROCEDURE — G0439 PPPS, SUBSEQ VISIT: HCPCS | Performed by: FAMILY MEDICINE

## 2020-02-28 PROCEDURE — G8482 FLU IMMUNIZE ORDER/ADMIN: HCPCS | Performed by: FAMILY MEDICINE

## 2020-02-28 ASSESSMENT — PATIENT HEALTH QUESTIONNAIRE - PHQ9
2. FEELING DOWN, DEPRESSED OR HOPELESS: 1
1. LITTLE INTEREST OR PLEASURE IN DOING THINGS: 1
SUM OF ALL RESPONSES TO PHQ9 QUESTIONS 1 & 2: 2
SUM OF ALL RESPONSES TO PHQ QUESTIONS 1-9: 2
SUM OF ALL RESPONSES TO PHQ QUESTIONS 1-9: 2

## 2020-02-28 ASSESSMENT — LIFESTYLE VARIABLES: HOW OFTEN DO YOU HAVE A DRINK CONTAINING ALCOHOL: 0

## 2020-02-28 NOTE — PROGRESS NOTES
Vaccine Information Sheet, \"Influenza - Inactivated\"  given to Melissa Bradford, or parent/legal guardian of  Melissa Bradford and verbalized understanding. Patient responses:    Have you ever had a reaction to a flu vaccine? No  Do you have any current illness? No  Have you ever had Guillian Syracuse Syndrome? No  Do you have a serious allergy to any of the follow: Neomycin, Polymyxin, Thimerosal, eggs or egg products? No    Flu vaccine given per order. Please see immunization tab. Risks and benefits explained. Current VIS given.
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
## 2020-02-28 NOTE — PATIENT INSTRUCTIONS
Personalized Preventive Plan for Lissy Cancer - 2/28/2020  Medicare offers a range of preventive health benefits. Some of the tests and screenings are paid in full while other may be subject to a deductible, co-insurance, and/or copay. Some of these benefits include a comprehensive review of your medical history including lifestyle, illnesses that may run in your family, and various assessments and screenings as appropriate. After reviewing your medical record and screening and assessments performed today your provider may have ordered immunizations, labs, imaging, and/or referrals for you. A list of these orders (if applicable) as well as your Preventive Care list are included within your After Visit Summary for your review. Other Preventive Recommendations:    · A preventive eye exam performed by an eye specialist is recommended every 1-2 years to screen for glaucoma; cataracts, macular degeneration, and other eye disorders. · A preventive dental visit is recommended every 6 months. · Try to get at least 150 minutes of exercise per week or 10,000 steps per day on a pedometer . · Order or download the FREE \"Exercise & Physical Activity: Your Everyday Guide\" from [employer]. Call 2-116.133.2422 or search [employer] online. · You need 3057-1920 mg of calcium and 9610-0114 IU of vitamin D per day. It is possible to meet your calcium requirement with diet alone, but a vitamin D supplement is usually necessary to meet this goal.  · When exposed to the sun, use a sunscreen that protects against both UVA and UVB radiation with an SPF of 30 or greater. Reapply every 2 to 3 hours or after sweating, drying off with a towel, or swimming. · Always wear a seat belt when traveling in a car. Always wear a helmet when riding a bicycle or motorcycle. Learning About Living [place]  What is a living will?     A living will is a legal form you use to write down the kind in your living will. Your state may offer an online registry. This is a place where you can store your living will online so the doctors and nurses who need to treat you can find it right away. What should you think about when creating a living will? Talk about your end-of-life wishes with your family members and your doctor. Let them know what you want. That way the people making decisions for you won't be surprised by your choices. Think about these questions as you make your living will:  Do you know enough about life support methods that might be used? If not, talk to your doctor so you know what might be done if you can't breathe on your own, your heart stops, or you're unable to swallow. What things would you still want to be able to do after you receive life-support methods? Would you want to be able to walk? To speak? To eat on your own? To live without the help of machines? If you have a choice, where do you want to be cared for? In your home? At a hospital or nursing home? Do you want certain Mosque practices performed if you become very ill? If you have a choice at the end of your life, where would you prefer to die? At home? In a hospital or nursing home? Somewhere else? Would you prefer to be buried or cremated? Do you want your organs to be donated after you die? What should you do with your living will? Make sure that your family members and your health care agent have copies of your living will. Give your doctor a copy of your living will to keep in your medical record. If you have more than one doctor, make sure that each one has a copy. You may want to put a copy of your living will where it can be easily found. Where can you learn more? Go to [email or web address] and sign in to your [email or web address] account. Enter X422 in the [employer] box to learn more about \"Learning About Living [place]. \"     If you do not have an account, please click on the \"Sign Up Now\" link. Current as of: April 1, 2019  Content Version: 12.3  © 8785-6039 [employer], Inc[employer]. Care instructions adapted under license by [facility] ([facility]). If you have questions about a medical condition or this instruction, always ask your healthcare professional. [address] any warranty or liability for your use of this information.     ·

## 2020-03-13 ENCOUNTER — NURSE ONLY (OUTPATIENT)
Dept: FAMILY MEDICINE CLINIC | Age: 65
End: 2020-03-13
Payer: MEDICARE

## 2020-03-13 ENCOUNTER — HOSPITAL ENCOUNTER (OUTPATIENT)
Age: 65
Discharge: HOME | End: 2020-03-15
Payer: MEDICARE

## 2020-03-13 LAB — TSH SERPL DL<=0.05 MIU/L-ACNC: 0.49 UIU/ML (ref 0.27–4.2)

## 2020-03-13 PROCEDURE — 36415 COLL VENOUS BLD VENIPUNCTURE: CPT | Performed by: FAMILY MEDICINE

## 2020-03-13 PROCEDURE — 84443 ASSAY THYROID STIM HORMONE: CPT

## 2020-03-17 RX ORDER — LEVOTHYROXINE SODIUM 0.05 MG/1
TABLET ORAL
Qty: 90 TABLET | Refills: 1 | Status: SHIPPED
Start: 2020-03-17 | End: 2020-09-02 | Stop reason: SDUPTHER

## 2020-05-29 RX ORDER — ESCITALOPRAM OXALATE 10 MG/1
TABLET ORAL
Qty: 90 TABLET | Refills: 1 | Status: SHIPPED
Start: 2020-05-29 | End: 2020-11-20 | Stop reason: SDUPTHER

## 2020-06-08 RX ORDER — BUTALBITAL, ASPIRIN, AND CAFFEINE 325; 50; 40 MG/1; MG/1; MG/1
1 CAPSULE ORAL 2 TIMES DAILY PRN
Qty: 60 CAPSULE | Refills: 0 | Status: SHIPPED
Start: 2020-06-08 | End: 2021-01-07 | Stop reason: SDUPTHER

## 2020-08-28 ENCOUNTER — OFFICE VISIT (OUTPATIENT)
Dept: FAMILY MEDICINE CLINIC | Age: 65
End: 2020-08-28
Payer: MEDICARE

## 2020-08-28 ENCOUNTER — HOSPITAL ENCOUNTER (OUTPATIENT)
Age: 65
Discharge: HOME | End: 2020-08-30
Payer: MEDICARE

## 2020-08-28 VITALS
TEMPERATURE: 97 F | RESPIRATION RATE: 18 BRPM | WEIGHT: 116 LBS | SYSTOLIC BLOOD PRESSURE: 174 MMHG | BODY MASS INDEX: 19.81 KG/M2 | OXYGEN SATURATION: 98 % | HEART RATE: 62 BPM | HEIGHT: 64 IN | DIASTOLIC BLOOD PRESSURE: 93 MMHG

## 2020-08-28 LAB
BILIRUBIN, POC: NORMAL
BLOOD URINE, POC: NORMAL
CLARITY, POC: NORMAL
COLOR, POC: CLEAR
GLUCOSE URINE, POC: NORMAL
KETONES, POC: NORMAL
LEUKOCYTE EST, POC: NORMAL
NITRITE, POC: NORMAL
PH, POC: 7.5
PROTEIN, POC: NORMAL
SPECIFIC GRAVITY, POC: 1.02
UROBILINOGEN, POC: 0.2

## 2020-08-28 PROCEDURE — G8420 CALC BMI NORM PARAMETERS: HCPCS | Performed by: FAMILY MEDICINE

## 2020-08-28 PROCEDURE — 81002 URINALYSIS NONAUTO W/O SCOPE: CPT | Performed by: FAMILY MEDICINE

## 2020-08-28 PROCEDURE — 3017F COLORECTAL CA SCREEN DOC REV: CPT | Performed by: FAMILY MEDICINE

## 2020-08-28 PROCEDURE — 84443 ASSAY THYROID STIM HORMONE: CPT

## 2020-08-28 PROCEDURE — 80053 COMPREHEN METABOLIC PANEL: CPT

## 2020-08-28 PROCEDURE — 1036F TOBACCO NON-USER: CPT | Performed by: FAMILY MEDICINE

## 2020-08-28 PROCEDURE — 85025 COMPLETE CBC W/AUTO DIFF WBC: CPT

## 2020-08-28 PROCEDURE — G8427 DOCREV CUR MEDS BY ELIG CLIN: HCPCS | Performed by: FAMILY MEDICINE

## 2020-08-28 PROCEDURE — 99213 OFFICE O/P EST LOW 20 MIN: CPT | Performed by: FAMILY MEDICINE

## 2020-08-28 PROCEDURE — 93000 ELECTROCARDIOGRAM COMPLETE: CPT | Performed by: FAMILY MEDICINE

## 2020-08-28 RX ORDER — MIRTAZAPINE 7.5 MG/1
7.5 TABLET, FILM COATED ORAL NIGHTLY
Qty: 30 TABLET | Refills: 2 | Status: SHIPPED
Start: 2020-08-28 | End: 2020-11-20 | Stop reason: SDUPTHER
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
## 2020-08-29 LAB
ALBUMIN SERPL-MCNC: 4.2 G/DL (ref 3.5–5.2)
ALP BLD-CCNC: 80 U/L (ref 35–104)
ALT SERPL-CCNC: 12 U/L (ref 0–32)
ANION GAP SERPL CALCULATED.3IONS-SCNC: 10 MMOL/L (ref 7–16)
AST SERPL-CCNC: 22 U/L (ref 0–31)
BASOPHILS ABSOLUTE: 0.02 E9/L (ref 0–0.2)
BASOPHILS RELATIVE PERCENT: 0.5 % (ref 0–2)
BILIRUB SERPL-MCNC: 0.3 MG/DL (ref 0–1.2)
BUN BLDV-MCNC: 11 MG/DL (ref 8–23)
CALCIUM SERPL-MCNC: 9.5 MG/DL (ref 8.6–10.2)
CHLORIDE BLD-SCNC: 103 MMOL/L (ref 98–107)
CO2: 29 MMOL/L (ref 22–29)
CREAT SERPL-MCNC: 0.7 MG/DL (ref 0.5–1)
EOSINOPHILS ABSOLUTE: 0.1 E9/L (ref 0.05–0.5)
EOSINOPHILS RELATIVE PERCENT: 2.7 % (ref 0–6)
GFR AFRICAN AMERICAN: >60
GFR NON-AFRICAN AMERICAN: >60 ML/MIN/1.73
GLUCOSE BLD-MCNC: 85 MG/DL (ref 74–99)
HCT VFR BLD CALC: 42.9 % (ref 34–48)
HEMOGLOBIN: 13.1 G/DL (ref 11.5–15.5)
IMMATURE GRANULOCYTES #: 0.01 E9/L
IMMATURE GRANULOCYTES %: 0.3 % (ref 0–5)
LYMPHOCYTES ABSOLUTE: 1.01 E9/L (ref 1.5–4)
LYMPHOCYTES RELATIVE PERCENT: 26.8 % (ref 20–42)
MCH RBC QN AUTO: 26.5 PG (ref 26–35)
MCHC RBC AUTO-ENTMCNC: 30.5 % (ref 32–34.5)
MCV RBC AUTO: 86.7 FL (ref 80–99.9)
MONOCYTES ABSOLUTE: 0.43 E9/L (ref 0.1–0.95)
MONOCYTES RELATIVE PERCENT: 11.4 % (ref 2–12)
NEUTROPHILS ABSOLUTE: 2.2 E9/L (ref 1.8–7.3)
NEUTROPHILS RELATIVE PERCENT: 58.3 % (ref 43–80)
PDW BLD-RTO: 13.8 FL (ref 11.5–15)
PLATELET # BLD: 218 E9/L (ref 130–450)
PMV BLD AUTO: 11.1 FL (ref 7–12)
POTASSIUM SERPL-SCNC: 4.8 MMOL/L (ref 3.5–5)
RBC # BLD: 4.95 E12/L (ref 3.5–5.5)
SODIUM BLD-SCNC: 142 MMOL/L (ref 132–146)
TOTAL PROTEIN: 8 G/DL (ref 6.4–8.3)
TSH SERPL DL<=0.05 MIU/L-ACNC: 2.91 UIU/ML (ref 0.27–4.2)
WBC # BLD: 3.8 E9/L (ref 4.5–11.5)

## 2020-09-02 ENCOUNTER — HOSPITAL ENCOUNTER (OUTPATIENT)
Dept: GENERAL RADIOLOGY | Age: 65
Discharge: HOME | End: 2020-09-04
Payer: MEDICARE

## 2020-09-02 PROCEDURE — 77063 BREAST TOMOSYNTHESIS BI: CPT

## 2020-09-02 PROCEDURE — 77080 DXA BONE DENSITY AXIAL: CPT

## 2020-09-02 RX ORDER — LEVOTHYROXINE SODIUM 0.05 MG/1
TABLET ORAL
Qty: 90 TABLET | Refills: 1 | Status: SHIPPED
Start: 2020-09-02 | End: 2021-04-01

## 2020-10-07 ENCOUNTER — NURSE ONLY (OUTPATIENT)
Dept: FAMILY MEDICINE CLINIC | Age: 65
End: 2020-10-07
Payer: MEDICARE

## 2020-10-07 PROCEDURE — G0008 ADMIN INFLUENZA VIRUS VAC: HCPCS | Performed by: FAMILY MEDICINE

## 2020-10-07 PROCEDURE — 90686 IIV4 VACC NO PRSV 0.5 ML IM: CPT | Performed by: FAMILY MEDICINE

## 2020-11-20 ENCOUNTER — OFFICE VISIT (OUTPATIENT)
Dept: FAMILY MEDICINE CLINIC | Age: 65
End: 2020-11-20
Payer: MEDICARE

## 2020-11-20 VITALS
OXYGEN SATURATION: 98 % | TEMPERATURE: 97.2 F | HEIGHT: 64 IN | WEIGHT: 126 LBS | BODY MASS INDEX: 21.51 KG/M2 | RESPIRATION RATE: 16 BRPM

## 2020-11-20 PROBLEM — E03.9 HYPOTHYROIDISM: Status: ACTIVE | Noted: 2020-11-20

## 2020-11-20 PROCEDURE — G8427 DOCREV CUR MEDS BY ELIG CLIN: HCPCS | Performed by: FAMILY MEDICINE

## 2020-11-20 PROCEDURE — G8482 FLU IMMUNIZE ORDER/ADMIN: HCPCS | Performed by: FAMILY MEDICINE

## 2020-11-20 PROCEDURE — 1036F TOBACCO NON-USER: CPT | Performed by: FAMILY MEDICINE

## 2020-11-20 PROCEDURE — 3017F COLORECTAL CA SCREEN DOC REV: CPT | Performed by: FAMILY MEDICINE

## 2020-11-20 PROCEDURE — G8420 CALC BMI NORM PARAMETERS: HCPCS | Performed by: FAMILY MEDICINE

## 2020-11-20 PROCEDURE — 99213 OFFICE O/P EST LOW 20 MIN: CPT | Performed by: FAMILY MEDICINE

## 2020-11-20 RX ORDER — ALBUTEROL SULFATE 90 UG/1
2 AEROSOL, METERED RESPIRATORY (INHALATION) EVERY 6 HOURS PRN
Qty: 1 INHALER | Refills: 3 | Status: SHIPPED
Start: 2020-11-20 | End: 2021-03-17 | Stop reason: SDUPTHER

## 2020-11-20 RX ORDER — MIRTAZAPINE 7.5 MG/1
7.5 TABLET, FILM COATED ORAL NIGHTLY
Qty: 30 TABLET | Refills: 3 | Status: SHIPPED
Start: 2020-11-20 | End: 2021-03-17 | Stop reason: SDUPTHER

## 2020-11-20 RX ORDER — BUTALBITAL, ASPIRIN, AND CAFFEINE 325; 50; 40 MG/1; MG/1; MG/1
1 CAPSULE ORAL 2 TIMES DAILY PRN
Qty: 60 CAPSULE | Refills: 0 | OUTPATIENT
Start: 2020-11-20 | End: 2020-12-20

## 2020-11-20 RX ORDER — FLUTICASONE PROPIONATE 110 UG/1
1 AEROSOL, METERED RESPIRATORY (INHALATION) 2 TIMES DAILY
Qty: 1 INHALER | Refills: 3 | Status: SHIPPED
Start: 2020-11-20 | End: 2021-03-17

## 2020-11-20 RX ORDER — ESCITALOPRAM OXALATE 10 MG/1
TABLET ORAL
Qty: 30 TABLET | Refills: 3 | Status: SHIPPED
Start: 2020-11-20 | End: 2021-03-17

## 2020-11-20 ASSESSMENT — ENCOUNTER SYMPTOMS
WHEEZING: 0
COUGH: 0

## 2020-11-20 ASSESSMENT — PATIENT HEALTH QUESTIONNAIRE - PHQ9
SUM OF ALL RESPONSES TO PHQ QUESTIONS 1-9: 0
SUM OF ALL RESPONSES TO PHQ QUESTIONS 1-9: 0
1. LITTLE INTEREST OR PLEASURE IN DOING THINGS: 0
SUM OF ALL RESPONSES TO PHQ QUESTIONS 1-9: 0
SUM OF ALL RESPONSES TO PHQ9 QUESTIONS 1 & 2: 0
2. FEELING DOWN, DEPRESSED OR HOPELESS: 0
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
## 2020-11-20 NOTE — TELEPHONE ENCOUNTER
KIT CAMPOS for pt to contact office and schedule follow up before refills can be given.   Electronically signed by Aicha Shaikh on 11/20/20 at 9:40 AM EST

## 2021-01-06 NOTE — PROGRESS NOTES
II: visual acuity  Blind OS   II: visual fields Partial R HH   II: pupils R RRL; L unreactive   III,VII: ptosis None   III,IV,VI: extraocular muscles  Mild L exotropia but full ROM without nystagmus   V: mastication Normal   V: facial light touch sensation  Normal   V,VII: corneal reflex     VII: facial muscle function - upper  Normal   VII: facial muscle function - lower Normal   VIII: hearing Normal   IX: soft palate elevation  Normal   IX,X: gag reflex    XI: trapezius strength  5/5   XI: sternocleidomastoid strength 5/5   XI: neck extension strength  5/5   XII: tongue strength  Normal     Motor:  Mild R spastic hemiparesis--contracture R index and thumb  5/5 L side  Normal bulk  No drift   No abnormal movements    Sensory:  LT normal    Coordination:   Mild spastic ataxia with FN on R only    Gait:  Mild right spastic hemiparetic gait    DTR:   Right Brachioradialis reflex 3+  Left Brachioradialis reflex 3+  Right Biceps reflex 3+  Left Biceps reflex 3+  Right Triceps reflex 3+  Left Triceps reflex 3+  Right Quadriceps reflex 3+  Left Quadriceps reflex 3+  Right Achilles reflex 3+  Left Achilles reflex 3+    R Gross's    No other pathological reflexes    Laboratory/Radiology:  ry/Radiology:     Lab Results   Component Value Date    WBC 3.8 (L) 08/28/2020    HGB 13.1 08/28/2020    HCT 42.9 08/28/2020    MCV 86.7 08/28/2020     08/28/2020    LYMPHOPCT 26.8 08/28/2020    RBC 4.95 08/28/2020    MCH 26.5 08/28/2020    MCHC 30.5 (L) 08/28/2020    RDW 13.8 08/28/2020     CMP:    Lab Results   Component Value Date     08/28/2020    K 4.8 08/28/2020     08/28/2020    CO2 29 08/28/2020    BUN 11 08/28/2020    CREATININE 0.7 08/28/2020    GFRAA >60 08/28/2020    LABGLOM >60 08/28/2020    GLUCOSE 85 08/28/2020    PROT 8.0 08/28/2020    LABALBU 4.2 08/28/2020    CALCIUM 9.5 08/28/2020    BILITOT 0.3 08/28/2020    ALKPHOS 80 08/28/2020    AST 22 08/28/2020    ALT 12 08/28/2020     All labs and images

## 2021-01-07 ENCOUNTER — OFFICE VISIT (OUTPATIENT)
Dept: NEUROLOGY | Age: 66
End: 2021-01-07
Payer: MEDICARE

## 2021-01-07 VITALS
TEMPERATURE: 97.9 F | RESPIRATION RATE: 12 BRPM | HEIGHT: 65 IN | OXYGEN SATURATION: 95 % | BODY MASS INDEX: 19.33 KG/M2 | HEART RATE: 69 BPM | SYSTOLIC BLOOD PRESSURE: 157 MMHG | WEIGHT: 116 LBS | DIASTOLIC BLOOD PRESSURE: 83 MMHG

## 2021-01-07 DIAGNOSIS — Z87.820 HX OF TRAUMATIC BRAIN INJURY: ICD-10-CM

## 2021-01-07 DIAGNOSIS — G43.101 MIGRAINE WITH AURA AND WITH STATUS MIGRAINOSUS, NOT INTRACTABLE: Primary | ICD-10-CM

## 2021-01-07 PROCEDURE — 1036F TOBACCO NON-USER: CPT | Performed by: NURSE PRACTITIONER

## 2021-01-07 PROCEDURE — G8427 DOCREV CUR MEDS BY ELIG CLIN: HCPCS | Performed by: NURSE PRACTITIONER

## 2021-01-07 PROCEDURE — 3017F COLORECTAL CA SCREEN DOC REV: CPT | Performed by: NURSE PRACTITIONER

## 2021-01-07 PROCEDURE — 99213 OFFICE O/P EST LOW 20 MIN: CPT | Performed by: NURSE PRACTITIONER

## 2021-01-07 PROCEDURE — G8420 CALC BMI NORM PARAMETERS: HCPCS | Performed by: NURSE PRACTITIONER

## 2021-01-07 PROCEDURE — G8482 FLU IMMUNIZE ORDER/ADMIN: HCPCS | Performed by: NURSE PRACTITIONER

## 2021-01-07 RX ORDER — BUTALBITAL, ASPIRIN, AND CAFFEINE 325; 50; 40 MG/1; MG/1; MG/1
1 CAPSULE ORAL DAILY PRN
Qty: 30 CAPSULE | Refills: 1 | Status: SHIPPED
Start: 2021-01-07 | End: 2021-08-25

## 2021-01-07 NOTE — PATIENT INSTRUCTIONS
Patient Education        Headache: Care Instructions  Your Care Instructions     Headaches have many possible causes. Most headaches aren't a sign of a more serious problem, and they will get better on their own. Home treatment may help you feel better faster. The doctor has checked you carefully, but problems can develop later. If you notice any problems or new symptoms, get medical treatment right away. Follow-up care is a key part of your treatment and safety. Be sure to make and go to all appointments, and call your doctor if you are having problems. It's also a good idea to know your test results and keep a list of the medicines you take. How can you care for yourself at home? · Do not drive if you have taken a prescription pain medicine. · Rest in a quiet, dark room until your headache is gone. Close your eyes and try to relax or go to sleep. Don't watch TV or read. · Put a cold, moist cloth or cold pack on the painful area for 10 to 20 minutes at a time. Put a thin cloth between the cold pack and your skin. · Use a warm, moist towel or a heating pad set on low to relax tight shoulder and neck muscles. · Have someone gently massage your neck and shoulders. · Take pain medicines exactly as directed. ? If the doctor gave you a prescription medicine for pain, take it as prescribed. ? If you are not taking a prescription pain medicine, ask your doctor if you can take an over-the-counter medicine. · Be careful not to take pain medicine more often than the instructions allow, because you may get worse or more frequent headaches when the medicine wears off. · Do not ignore new symptoms that occur with a headache, such as a fever, weakness or numbness, vision changes, or confusion. These may be signs of a more serious problem.   To prevent headaches · Keep a headache diary so you can figure out what triggers your headaches. Avoiding triggers may help you prevent headaches. Record when each headache began, how long it lasted, and what the pain was like (throbbing, aching, stabbing, or dull). Write down any other symptoms you had with the headache, such as nausea, flashing lights or dark spots, or sensitivity to bright light or loud noise. Note if the headache occurred near your period. List anything that might have triggered the headache, such as certain foods (chocolate, cheese, wine) or odors, smoke, bright light, stress, or lack of sleep. · Find healthy ways to deal with stress. Headaches are most common during or right after stressful times. Take time to relax before and after you do something that has caused a headache in the past.  · Try to keep your muscles relaxed by keeping good posture. Check your jaw, face, neck, and shoulder muscles for tension, and try relaxing them. When sitting at a desk, change positions often, and stretch for 30 seconds each hour. · Get plenty of sleep and exercise. · Eat regularly and well. Long periods without food can trigger a headache. · Treat yourself to a massage. Some people find that regular massages are very helpful in relieving tension. · Limit caffeine by not drinking too much coffee, tea, or soda. But don't quit caffeine suddenly, because that can also give you headaches. · Reduce eyestrain from computers by blinking frequently and looking away from the computer screen every so often. Make sure you have proper eyewear and that your monitor is set up properly, about an arm's length away. · Seek help if you have depression or anxiety. Your headaches may be linked to these conditions. Treatment can both prevent headaches and help with symptoms of anxiety or depression. When should you call for help? Call 911 anytime you think you may need emergency care.  For example, call if:   · You have signs of a stroke. These may include:  ? Sudden numbness, paralysis, or weakness in your face, arm, or leg, especially on only one side of your body. ? Sudden vision changes. ? Sudden trouble speaking. ? Sudden confusion or trouble understanding simple statements. ? Sudden problems with walking or balance. ? A sudden, severe headache that is different from past headaches. Call your doctor now or seek immediate medical care if:    · You have a new or worse headache.     · Your headache gets much worse. Where can you learn more? Go to https://YkonepeTivixeb.Sopogy. org and sign in to your [email or web address] account. Enter 4581 [id number] in the [employer] box to learn more about \"Headache: Care Instructions. \"     If you do not have an account, please click on the \"Sign Up Now\" link. Current as of: November 20, 2019               Content Version: 12.6  © 7298-5543 [employer]. Care instructions adapted under license by [facility] ([place] [facility]). If you have questions about a medical condition or this instruction, always ask your healthcare professional. [address] any warranty or liability for your use of this information. Patient Education        aspirin, butalbital, and caffeine  Pronunciation:  AS pir in, JOANNE Flannery  Brand:  Butalbital Compound, Fiorinal  What is the most important information I should know about aspirin, butalbital, and caffeine? You should not use this medicine if you have a stomach ulcer, severe liver disease, porphyria, a bleeding or blood clotting disorder, or if you are allergic to any NSAID. Aspirin may cause stomach or intestinal bleeding, which can be fatal.   Stop using aspirin, butalbital, and caffeine and call your doctor at once if you have: black, bloody, or tarry stools, and coughing up blood or vomit that looks like coffee grounds. Aspirin should not be given to a child or teenager who has a fever. Butalbital may be habit-forming. Misuse of habit-forming medicine can cause addiction, overdose, or death. What is aspirin, butalbital, and caffeine? Aspirin is a pain reliever, as well as an anti-inflammatory and a fever reducer. Butalbital is a barbiturate. It relaxes muscle contractions involved in a tension headache. Caffeine is a central nervous system stimulant. It relaxes muscle contractions in blood vessels to improve blood flow. Aspirin, butalbital, and caffeine is a combination medicine used to treat tension headaches. This medicine is not for treating headaches that come and go. Aspirin, butalbital, and caffeine may also be used for purposes not those listed in this medication guide. What should I discuss with my healthcare provider before taking aspirin, butalbital, and caffeine? Aspirin may cause stomach or intestinal bleeding, which can be fatal. This can occur without warning while you are taking this medicine. You should not use this medicine if you are allergic to aspirin, butalbital, or caffeine, or if you have:  · stomach ulcer;  · severe liver disease;  · porphyria (a genetic enzyme disorder that causes symptoms affecting the skin or nervous system);  · a bleeding or blood clotting disorder (such as hemophilia); or  · asthma, or a history of severe allergic reaction (sneezing, runny or stuffy nose, wheezing, shortness of breath) after taking aspirin or another NSAID (nonsteroidal anti-inflammatory drug) such as Advil, Motrin, Aleve, Celebrex, Orudis, Indocin, Lodine, Voltaren, Toradol, Mobic, Relafen, Feldene, and others.   To make sure aspirin, butalbital, and caffeine is safe for you, tell your doctor if you have:  · liver or kidney disease;  · asthma or another respiratory disease;  · fluid retention;  · heart disease, congestive heart failure, high blood pressure;  · a history of head injury or brain tumor;  · a stomach or intestinal disorder;  · a thyroid disorder; Do not stop using this medicine suddenly after long-term use, or you could have unpleasant withdrawal symptoms. Ask your doctor how to safely stop using aspirin, butalbital, and caffeine. Store at room temperature away from moisture and heat. Keep track of the amount of medicine used from each new bottle. Aspirin, butalbital, and caffeine is a drug of abuse and you should be aware if anyone is using your medicine improperly or without a prescription. What happens if I miss a dose? Since this medicine is used when needed, you may not be on a dosing schedule. If you are on a schedule, use the missed dose as soon as you remember. Skip the missed dose if it is almost time for your next scheduled dose. Do not use extra medicine to make up the missed dose. What happens if I overdose? Seek emergency medical attention or call the Poison Help line at 1-969.496.1114. An overdose of butalbital can be fatal.  Overdose symptoms may include extreme drowsiness, confusion, restless feeling, insomnia, tremors, fast heart rate, vomiting, stomach pain, rapid breathing, feeling hot, ringing in your ears, weak or shallow breathing, seizure (convulsions), or fainting. What should I avoid while taking aspirin, butalbital, and caffeine? This medication may impair your thinking or reactions. Avoid driving or operating machinery until you know how the medicine will affect you. Ask a doctor or pharmacist before using any cough, cold, allergy, or pain medication. Aspirin and caffeine are contained in many combination medicines. Taking certain products together can cause you to get too much of a certain drug. Check the label to see if a medicine contains aspirin or caffeine. Do not drink alcohol. Dangerous side effects, overdose, or death can occur when alcohol is combined with butalbital. Alcohol may increase your risk of stomach bleeding while you are taking aspirin. What are the possible side effects of aspirin, butalbital, and caffeine? Get emergency medical help if you have signs of an allergic reaction: hives; difficult breathing; swelling of your face, lips, tongue, or throat. Call your doctor at once if you have:  · shallow breathing, slow heart rate;  · fast or pounding heart rate, muscle twitching;  · confusion, unusual thoughts or behavior;  · symptoms of stomach bleeding--bloody or tarry stools, coughing up blood or vomit that looks like coffee grounds;  · problems with urination; or  · easy bruising, unusual bleeding (nose, mouth, vagina, or rectum), purple or red pinpoint spots under your skin. Serious side effects may be more likely in older adults and those who are ill or debilitated. Common side effects include:  · drowsiness, dizziness;  · nausea, gas, upset stomach, stomach pain; or  · sleep problems (insomnia). This is not a complete list of side effects and others may occur. Call your doctor for medical advice about side effects. You may report side effects to FDA at 0-964-FDA-4313. What other drugs will affect aspirin, butalbital, and caffeine? Taking this medicine with other drugs that make you sleepy or slow your breathing can cause dangerous side effects or death. Ask your doctor before taking a sleeping pill, narcotic pain medicine, prescription cough medicine, a muscle relaxer, or medicine for anxiety, depression, or seizures. You should not use aspirin, butalbital, and caffeine if you have used an MAO inhibitor in the past 14 days. A dangerous drug interaction could occur. MAO inhibitors include isocarboxazid, linezolid, methylene blue injection, phenelzine, rasagiline, selegiline, tranylcypromine, and others.   Tell your doctor about all your current medicines and any you start or stop using, especially:  · mercaptopurine;  · methotrexate;  · probenecid;  · insulin or oral diabetes medicine;  · a blood thinner --warfarin, Coumadin, Camella Pea; or · steroid medicine --prednisone, dexamethasone, and others. This list is not complete. Other drugs may interact with aspirin, butalbital, and caffeine, including prescription and over-the-counter medicines, vitamins, and herbal products. Not all possible interactions are listed in this medication guide. Where can I get more information? Your pharmacist can provide more information about aspirin, butalbital, and caffeine. Remember, keep this and all other medicines out of the reach of children, never share your medicines with others, and use this medication only for the indication prescribed. Every effort has been made to ensure that the information provided by [address] is accurate, up-to-date, and complete, but no guarantee is made to that effect. Drug information contained herein may be time sensitive. [facility] information has been compiled for use by healthcare practitioners and consumers in the [place] and therefore [facility] does not warrant that uses outside of the [place] are appropriate, unless specifically indicated otherwise. [facility]'s drug information does not endorse drugs, diagnose patients or recommend therapy. [facility]'s drug information is an informational resource designed to assist licensed healthcare practitioners in caring for their patients and/or to serve consumers viewing this service as a supplement to, and not a substitute for, the expertise, skill, knowledge and judgment of healthcare practitioners. The absence of a warning for a given drug or drug combination in no way should be construed to indicate that the drug or drug combination is safe, effective or appropriate for any given patient. [facility] does not assume any responsibility for any aspect of healthcare administered with the aid of information [facility] provides. The information contained herein is not intended to cover all possible uses, directions, precautions, warnings, drug interactions, allergic reactions, or adverse effects. If you have questions about the drugs you are taking, check with your doctor, nurse or pharmacist.  Copyright 9142-1124 [address]: 2.03. Revision date: 11/28/2016. Care instructions adapted under license by [facility] ([facility]). If you have questions about a medical condition or this instruction, always ask your healthcare professional. [place] [address] any warranty or liability for your use of this information.

## 2021-01-19 DIAGNOSIS — G43.109 MIGRAINE WITH AURA AND WITHOUT STATUS MIGRAINOSUS, NOT INTRACTABLE: Chronic | ICD-10-CM

## 2021-01-20 RX ORDER — MULTIVITAMIN WITH FOLIC ACID 400 MCG
TABLET ORAL
Qty: 90 TABLET | Refills: 3 | Status: SHIPPED
Start: 2021-01-20 | End: 2021-10-19 | Stop reason: SDUPTHER

## 2021-01-28 DIAGNOSIS — E03.0 CONGENITAL HYPOTHYROIDISM WITH DIFFUSE GOITER: ICD-10-CM

## 2021-01-28 DIAGNOSIS — G47.00 INSOMNIA, UNSPECIFIED TYPE: ICD-10-CM

## 2021-01-28 RX ORDER — MIRTAZAPINE 7.5 MG/1
7.5 TABLET, FILM COATED ORAL NIGHTLY
Qty: 90 TABLET | Refills: 1 | OUTPATIENT
Start: 2021-01-28

## 2021-01-28 RX ORDER — LEVOTHYROXINE SODIUM 0.05 MG/1
TABLET ORAL
Qty: 90 TABLET | Refills: 1 | OUTPATIENT
Start: 2021-01-28

## 2021-02-18 DIAGNOSIS — G43.109 MIGRAINE WITH AURA AND WITHOUT STATUS MIGRAINOSUS, NOT INTRACTABLE: Chronic | ICD-10-CM

## 2021-02-18 NOTE — TELEPHONE ENCOUNTER
Requested Prescriptions     Pending Prescriptions Disp Refills    vitamin D ([facility] D3) 25 MCG (1000 UT) CAPS 30 capsule 11     Sig: TAKE 1 CAPSULE DAILY AS DIRECTED

## 2021-02-19 RX ORDER — CHOLECALCIFEROL (VITAMIN D3) 25 MCG
CAPSULE ORAL
Qty: 30 CAPSULE | Refills: 11 | Status: SHIPPED
Start: 2021-02-19 | End: 2021-10-19 | Stop reason: SDUPTHER

## 2021-03-02 ENCOUNTER — OFFICE VISIT (OUTPATIENT)
Dept: FAMILY MEDICINE CLINIC | Age: 66
End: 2021-03-02
Payer: MEDICARE

## 2021-03-02 VITALS
DIASTOLIC BLOOD PRESSURE: 84 MMHG | SYSTOLIC BLOOD PRESSURE: 134 MMHG | WEIGHT: 136.2 LBS | BODY MASS INDEX: 22.69 KG/M2 | HEIGHT: 65 IN | TEMPERATURE: 97.5 F | OXYGEN SATURATION: 96 % | HEART RATE: 76 BPM | RESPIRATION RATE: 16 BRPM

## 2021-03-02 DIAGNOSIS — Z00.00 ROUTINE GENERAL MEDICAL EXAMINATION AT A HEALTH CARE FACILITY: Primary | ICD-10-CM

## 2021-03-02 PROCEDURE — 3017F COLORECTAL CA SCREEN DOC REV: CPT | Performed by: FAMILY MEDICINE

## 2021-03-02 PROCEDURE — G8482 FLU IMMUNIZE ORDER/ADMIN: HCPCS | Performed by: FAMILY MEDICINE

## 2021-03-02 PROCEDURE — G0439 PPPS, SUBSEQ VISIT: HCPCS | Performed by: FAMILY MEDICINE

## 2021-03-02 ASSESSMENT — PATIENT HEALTH QUESTIONNAIRE - PHQ9
2. FEELING DOWN, DEPRESSED OR HOPELESS: 0
SUM OF ALL RESPONSES TO PHQ QUESTIONS 1-9: 0
SUM OF ALL RESPONSES TO PHQ QUESTIONS 1-9: 0
1. LITTLE INTEREST OR PLEASURE IN DOING THINGS: 0
SUM OF ALL RESPONSES TO PHQ QUESTIONS 1-9: 0

## 2021-03-02 ASSESSMENT — LIFESTYLE VARIABLES: HOW OFTEN DO YOU HAVE A DRINK CONTAINING ALCOHOL: 0
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
## 2021-03-02 NOTE — ACP (ADVANCE CARE PLANNING)
Discussed during medicare wellness. Patient is full code for emergencies. She would like her daughter, Radha Manjarrez to be medical decision maker, and her sister, Olya Levine to be a backup to this. Will complete living will/ healthcare power of .

## 2021-03-02 NOTE — PATIENT INSTRUCTIONS
make decisions for you if you don't have a health care agent? If you don't have a health care agent or a living will, you may not get the care you want. Decisions may be made by family members who disagree about your medical care. Or decisions may be made by a medical professional who doesn't know you well. In some cases, a  makes the decisions. When you name a health care agent, it is very clear who has the power to make health decisions for you. How do you name a health care agent? You name your health care agent on a legal form. This form is usually called a medical power of . Ask your hospital, state bar association, or office on aging where to find these forms. You must sign the form to make it legal. Some states require you to get the form notarized. This means that a person called a  watches you sign the form and then he or she signs the form. Some states also require that two or more witnesses sign the form. Be sure to tell your family members and doctors who your health care agent is. Where can you learn more? Go to [email or web address] org and sign in to your [employer] account. Enter [id number] in the Victrio box to learn more about \"Learning About [address]. \"     If you do not have an account, please click on the \"Sign Up Now\" link. Current as of: December 9, 2019               Content Version: 12.6  © 4369-3043 [employer]. Care instructions adapted under license by [facility] ([facility]). If you have questions about a medical condition or this instruction, always ask your healthcare professional. [place] [address] any warranty or liability for your use of this information. Personalized Preventive Plan for Bill Payer - 3/2/2021  Medicare offers a range of preventive health benefits.  Some of the tests and screenings are paid in full while other may be subject to a deductible, co-insurance, and/or copay.    Some of these benefits include a comprehensive review of your medical history including lifestyle, illnesses that may run in your family, and various assessments and screenings as appropriate. After reviewing your medical record and screening and assessments performed today your provider may have ordered immunizations, labs, imaging, and/or referrals for you. A list of these orders (if applicable) as well as your Preventive Care list are included within your After Visit Summary for your review. Other Preventive Recommendations:    · A preventive eye exam performed by an eye specialist is recommended every 1-2 years to screen for glaucoma; cataracts, macular degeneration, and other eye disorders. · A preventive dental visit is recommended every 6 months. · Try to get at least 150 minutes of exercise per week or 10,000 steps per day on a pedometer . · Order or download the FREE \"Exercise & Physical Activity: Your Everyday Guide\" from [employer]. Call 1-990.102.7641 or search [employer] online. · You need 7442-0735 mg of calcium and 6470-7636 IU of vitamin D per day. It is possible to meet your calcium requirement with diet alone, but a vitamin D supplement is usually necessary to meet this goal.  · When exposed to the sun, use a sunscreen that protects against both UVA and UVB radiation with an SPF of 30 or greater. Reapply every 2 to 3 hours or after sweating, drying off with a towel, or swimming. · Always wear a seat belt when traveling in a car. Always wear a helmet when riding a bicycle or motorcycle.

## 2021-03-17 DIAGNOSIS — J45.30 MILD PERSISTENT ASTHMA WITHOUT COMPLICATION: ICD-10-CM

## 2021-03-17 DIAGNOSIS — F32.89 OTHER DEPRESSION: ICD-10-CM

## 2021-03-17 DIAGNOSIS — G47.00 INSOMNIA, UNSPECIFIED TYPE: ICD-10-CM

## 2021-03-17 DIAGNOSIS — G43.109 MIGRAINE WITH AURA AND WITHOUT STATUS MIGRAINOSUS, NOT INTRACTABLE: Chronic | ICD-10-CM

## 2021-03-17 RX ORDER — ALBUTEROL SULFATE 90 UG/1
2 AEROSOL, METERED RESPIRATORY (INHALATION) EVERY 6 HOURS PRN
Qty: 1 INHALER | Refills: 2 | Status: SHIPPED
Start: 2021-03-17 | End: 2021-06-10

## 2021-03-17 RX ORDER — MIRTAZAPINE 7.5 MG/1
7.5 TABLET, FILM COATED ORAL NIGHTLY
Qty: 30 TABLET | Refills: 2 | Status: SHIPPED
Start: 2021-03-17 | End: 2021-03-25 | Stop reason: DRUGHIGH

## 2021-03-17 RX ORDER — DEXAMETHASONE 4 MG/1
TABLET ORAL
Qty: 1 INHALER | Refills: 2 | Status: SHIPPED
Start: 2021-03-17 | End: 2021-05-12

## 2021-03-17 RX ORDER — ESCITALOPRAM OXALATE 10 MG/1
TABLET ORAL
Qty: 30 TABLET | Refills: 2 | Status: SHIPPED
Start: 2021-03-17 | End: 2021-06-14

## 2021-03-25 ENCOUNTER — OFFICE VISIT (OUTPATIENT)
Dept: FAMILY MEDICINE CLINIC | Age: 66
End: 2021-03-25
Payer: MEDICARE

## 2021-03-25 ENCOUNTER — TELEPHONE (OUTPATIENT)
Dept: FAMILY MEDICINE CLINIC | Age: 66
End: 2021-03-25

## 2021-03-25 VITALS
SYSTOLIC BLOOD PRESSURE: 135 MMHG | OXYGEN SATURATION: 98 % | TEMPERATURE: 97.2 F | HEART RATE: 71 BPM | RESPIRATION RATE: 16 BRPM | DIASTOLIC BLOOD PRESSURE: 78 MMHG | BODY MASS INDEX: 22.82 KG/M2 | WEIGHT: 137 LBS | HEIGHT: 65 IN

## 2021-03-25 DIAGNOSIS — E03.0 CONGENITAL HYPOTHYROIDISM WITH DIFFUSE GOITER: ICD-10-CM

## 2021-03-25 DIAGNOSIS — G47.00 INSOMNIA, UNSPECIFIED TYPE: ICD-10-CM

## 2021-03-25 DIAGNOSIS — L60.8 MELANONYCHIA: ICD-10-CM

## 2021-03-25 DIAGNOSIS — L60.9 NAIL ABNORMALITIES: Primary | ICD-10-CM

## 2021-03-25 PROCEDURE — 99213 OFFICE O/P EST LOW 20 MIN: CPT | Performed by: FAMILY MEDICINE

## 2021-03-25 PROCEDURE — G8427 DOCREV CUR MEDS BY ELIG CLIN: HCPCS | Performed by: FAMILY MEDICINE

## 2021-03-25 PROCEDURE — 3017F COLORECTAL CA SCREEN DOC REV: CPT | Performed by: FAMILY MEDICINE

## 2021-03-25 PROCEDURE — G8420 CALC BMI NORM PARAMETERS: HCPCS | Performed by: FAMILY MEDICINE

## 2021-03-25 PROCEDURE — G8482 FLU IMMUNIZE ORDER/ADMIN: HCPCS | Performed by: FAMILY MEDICINE

## 2021-03-25 PROCEDURE — 1036F TOBACCO NON-USER: CPT | Performed by: FAMILY MEDICINE

## 2021-03-25 RX ORDER — MIRTAZAPINE 7.5 MG/1
7.5 TABLET, FILM COATED ORAL NIGHTLY PRN
Qty: 30 TABLET | Refills: 2 | Status: SHIPPED
Start: 2021-03-25 | End: 2021-06-07

## 2021-03-25 RX ORDER — LEVOTHYROXINE SODIUM 0.05 MG/1
TABLET ORAL
Qty: 30 TABLET | Refills: 2 | Status: CANCELLED | OUTPATIENT
Start: 2021-03-25

## 2021-03-25 ASSESSMENT — PATIENT HEALTH QUESTIONNAIRE - PHQ9
SUM OF ALL RESPONSES TO PHQ9 QUESTIONS 1 & 2: 0
SUM OF ALL RESPONSES TO PHQ QUESTIONS 1-9: 0

## 2021-03-25 ASSESSMENT — ENCOUNTER SYMPTOMS
WHEEZING: 0
COUGH: 0

## 2021-03-25 NOTE — TELEPHONE ENCOUNTER
Can you please change the dermatology referral to Dr Roxana Sethi. He is the only doctor in network with her insurance. Thank you.

## 2021-03-25 NOTE — PROGRESS NOTES
3/25/2021    Bill Payer is a 63 y.o. female here for   Chief Complaint   Patient presents with    Asthma    Hypothyroidism   on flovent 110 mcg 1 puff twice daily for asthma. She is doing well. She has not had any exacerbations. She uses inhale as prescribed. Has not had nighttime cough. She is aware of her triggers and does her best to avoid. She continues to be physically active. On lexapro 10 mg and remeron for mood /insomnia  Note improvement in weight   Has gained weight and has had to give away from clothes  She is physically active daily. She reports good appetite. She is eating generally healthy foods. Lab Results   Component Value Date    TSH 2.910 08/28/2020     She has has thickening and darkening of of her nails, most noted in her thumb. She will push back her cuticles. She has a little bit of darkening of her cuticles as well. Dark vertical stripe is present on more than one nail - and has been gradually more noticeable. Wt Readings from Last 3 Encounters:   03/25/21 137 lb (62.1 kg)   03/02/21 136 lb 3.2 oz (61.8 kg)   01/07/21 116 lb (52.6 kg)       She  reports that she quit smoking about 3 years ago. Her smoking use included cigarettes. She has a 0.50 pack-year smoking history. She has never used smokeless tobacco.    Medications and allergies reviewed and updated in chart.     Current Outpatient Medications   Medication Sig Dispense Refill    FLOVENT  MCG/ACT inhaler TAKE 1 PUFF BY MOUTH TWICE A DAY 1 Inhaler 2    escitalopram (LEXAPRO) 10 MG tablet TAKE 1 TABLET BY MOUTH EVERY DAY 30 tablet 2    mirtazapine (REMERON) 7.5 MG tablet Take 1 tablet by mouth nightly For insomnia 30 tablet 2    albuterol sulfate HFA (PROVENTIL HFA) 108 (90 Base) MCG/ACT inhaler Inhale 2 puffs into the lungs every 6 hours as needed for Shortness of Breath 1 Inhaler 2    vitamin D (CVS D3) 25 MCG (1000 UT) CAPS TAKE 1 CAPSULE DAILY AS DIRECTED 30 capsule 11    Multiple Vitamin (DAILY-RAVEN) TABS TAKE 1 TABLET BY MOUTH EVERY DAY 90 tablet 3    butalbital-aspirin-caffeine (FIORINAL) -40 MG capsule Take 1 capsule by mouth daily as needed for Headaches (severe headache) for up to 360 days. 30 capsule 1    levothyroxine (SYNTHROID) 50 MCG tablet Take 1 tablet by mouth daily except Sunday (skip Sunday) 90 tablet 1    amitriptyline (ELAVIL) 25 MG tablet Take 12.5 mg by mouth nightly as needed  2    cyanocobalamin 1000 MCG/ML injection Infuse intravenously every 30 days        No current facility-administered medications for this visit. Facility-Administered Medications Ordered in Other Visits   Medication Dose Route Frequency Provider Last Rate Last Admin    sodium chloride (PF) 0.9 % injection 10 mL  10 mL Intravenous PRN Keren Goldberg MD            Patient'spast medical, surgical, social and/or family history reviewed, updated in chart, and are non-contributory (unless otherwise stated). Review of Systems  Review of Systems   Constitutional: Positive for unexpected weight change (weight gain, though she has been eating more than usual). Negative for activity change, chills, fatigue and fever. Respiratory: Negative for cough and wheezing. Cardiovascular: Negative for chest pain and leg swelling. Neurological: Negative for dizziness and headaches. Psychiatric/Behavioral: Positive for sleep disturbance (improved, taking remeron prn). Negative for decreased concentration and dysphoric mood. PE:  VS:  /78   Pulse 71   Temp 97.2 °F (36.2 °C) (Temporal)   Resp 16   Ht 5' 4.75\" (1.645 m)   Wt 137 lb (62.1 kg)   LMP 01/02/2015   SpO2 98%   Breastfeeding No   BMI 22.97 kg/m²   Physical Exam  Constitutional:       Appearance: She is well-developed. HENT:      Head: Normocephalic and atraumatic. Cardiovascular:      Rate and Rhythm: Normal rate and regular rhythm. Heart sounds: No murmur. No friction rub. No gallop.     Pulmonary:      Effort: Pulmonary effort is normal.      Breath sounds: Normal breath sounds. No wheezing or rales. Skin:     General: Skin is warm and dry. Comments: Vertical hyperpigemented stripe of bilateral nail - first finger with thick vertical ridge of nail noted on left thumbnail. Neurological:      Mental Status: She is alert and oriented to person, place, and time. Assessment/Plan:  Geeta Lindo was seen today for asthma and hypothyroidism. Diagnoses and all orders for this visit:    Nail abnormalities  Nail hyperpigmentation/ melanonychia   Will refer to dermatology for evaluation for melanoma though ddx also includes benign hyperpigmentation / ethnic melanonychia/ pseudohutchinsons sign. She has thickening/ ridge of nail as well, so hyperpigmentation could be post traumatic  - refer to dermatology      Congenital hypothyroidism with diffuse goiter  With weight gain will check tsh   -     TSH; Future    Insomnia, unspecified type  Sleep hygiene and prn remeron have improved symptoms  -     mirtazapine (REMERON) 7.5 MG tablet; Take 1 tablet by mouth nightly as needed For insomnia        Return in about 3 months (around 6/25/2021) for 3-4 months. Advised patient to call with any new medication issues. All questions answered.   Call or go to emergency department ifsymptoms worsen or persist.

## 2021-03-31 ENCOUNTER — NURSE ONLY (OUTPATIENT)
Dept: FAMILY MEDICINE CLINIC | Age: 66
End: 2021-03-31
Payer: MEDICARE

## 2021-03-31 DIAGNOSIS — E03.0 CONGENITAL HYPOTHYROIDISM WITH DIFFUSE GOITER: ICD-10-CM

## 2021-03-31 DIAGNOSIS — E03.9 HYPOTHYROIDISM, UNSPECIFIED TYPE: ICD-10-CM

## 2021-03-31 LAB — TSH SERPL DL<=0.05 MIU/L-ACNC: 4.97 UIU/ML (ref 0.27–4.2)

## 2021-03-31 PROCEDURE — 36415 COLL VENOUS BLD VENIPUNCTURE: CPT | Performed by: FAMILY MEDICINE

## 2021-04-01 DIAGNOSIS — E03.0 CONGENITAL HYPOTHYROIDISM WITH DIFFUSE GOITER: ICD-10-CM

## 2021-04-01 RX ORDER — LEVOTHYROXINE SODIUM 0.05 MG/1
50 TABLET ORAL DAILY
Qty: 90 TABLET | Refills: 1
Start: 2021-04-01 | End: 2021-08-30 | Stop reason: SDUPTHER

## 2021-04-10 DIAGNOSIS — E03.0 CONGENITAL HYPOTHYROIDISM WITH DIFFUSE GOITER: ICD-10-CM

## 2021-04-12 RX ORDER — LEVOTHYROXINE SODIUM 0.05 MG/1
TABLET ORAL
Qty: 90 TABLET | Refills: 1 | OUTPATIENT
Start: 2021-04-12

## 2021-05-11 DIAGNOSIS — J45.30 MILD PERSISTENT ASTHMA WITHOUT COMPLICATION: ICD-10-CM

## 2021-05-12 RX ORDER — DEXAMETHASONE 4 MG/1
TABLET ORAL
Qty: 1 INHALER | Refills: 3 | Status: SHIPPED
Start: 2021-05-12 | End: 2021-07-06

## 2021-06-07 DIAGNOSIS — G47.00 INSOMNIA, UNSPECIFIED TYPE: ICD-10-CM

## 2021-06-08 RX ORDER — MIRTAZAPINE 7.5 MG/1
TABLET, FILM COATED ORAL
Qty: 30 TABLET | Refills: 2 | Status: SHIPPED
Start: 2021-06-08 | End: 2021-08-30

## 2021-06-10 DIAGNOSIS — J45.30 MILD PERSISTENT ASTHMA WITHOUT COMPLICATION: ICD-10-CM

## 2021-06-11 RX ORDER — ALBUTEROL SULFATE 90 UG/1
AEROSOL, METERED RESPIRATORY (INHALATION)
Qty: 6.7 INHALER | Refills: 0 | Status: SHIPPED
Start: 2021-06-11 | End: 2021-06-28

## 2021-06-13 DIAGNOSIS — F32.89 OTHER DEPRESSION: ICD-10-CM

## 2021-06-13 DIAGNOSIS — G43.109 MIGRAINE WITH AURA AND WITHOUT STATUS MIGRAINOSUS, NOT INTRACTABLE: Chronic | ICD-10-CM

## 2021-06-14 RX ORDER — ESCITALOPRAM OXALATE 10 MG/1
TABLET ORAL
Qty: 90 TABLET | Refills: 0 | Status: SHIPPED
Start: 2021-06-14 | End: 2021-08-30 | Stop reason: SDUPTHER

## 2021-06-28 DIAGNOSIS — J45.30 MILD PERSISTENT ASTHMA WITHOUT COMPLICATION: ICD-10-CM

## 2021-06-28 RX ORDER — ALBUTEROL SULFATE 90 UG/1
AEROSOL, METERED RESPIRATORY (INHALATION)
Qty: 6.7 INHALER | Refills: 0 | Status: SHIPPED
Start: 2021-06-28 | End: 2021-07-22

## 2021-07-03 DIAGNOSIS — J45.30 MILD PERSISTENT ASTHMA WITHOUT COMPLICATION: ICD-10-CM

## 2021-07-06 RX ORDER — DEXAMETHASONE 4 MG/1
TABLET ORAL
Qty: 1 INHALER | Refills: 0 | Status: SHIPPED
Start: 2021-07-06 | End: 2021-08-30 | Stop reason: SDUPTHER

## 2021-07-08 ENCOUNTER — OFFICE VISIT (OUTPATIENT)
Dept: FAMILY MEDICINE CLINIC | Age: 66
End: 2021-07-08
Payer: MEDICARE

## 2021-07-08 ENCOUNTER — HOSPITAL ENCOUNTER (OUTPATIENT)
Age: 66
Discharge: HOME | End: 2021-07-10
Payer: MEDICARE

## 2021-07-08 ENCOUNTER — HOSPITAL ENCOUNTER (OUTPATIENT)
Age: 66
Discharge: HOME | End: 2021-07-08
Payer: MEDICARE

## 2021-07-08 ENCOUNTER — HOSPITAL ENCOUNTER (OUTPATIENT)
Dept: GENERAL RADIOLOGY | Age: 66
Discharge: HOME | End: 2021-07-10
Payer: MEDICARE

## 2021-07-08 VITALS
HEART RATE: 68 BPM | SYSTOLIC BLOOD PRESSURE: 138 MMHG | DIASTOLIC BLOOD PRESSURE: 77 MMHG | RESPIRATION RATE: 16 BRPM | TEMPERATURE: 98.1 F | WEIGHT: 141 LBS | HEIGHT: 64 IN | BODY MASS INDEX: 24.07 KG/M2 | OXYGEN SATURATION: 97 %

## 2021-07-08 DIAGNOSIS — E03.0 CONGENITAL HYPOTHYROIDISM WITH DIFFUSE GOITER: ICD-10-CM

## 2021-07-08 DIAGNOSIS — M79.671 RIGHT FOOT PAIN: ICD-10-CM

## 2021-07-08 DIAGNOSIS — J45.30 MILD PERSISTENT ASTHMA WITHOUT COMPLICATION: Primary | ICD-10-CM

## 2021-07-08 LAB — TSH SERPL DL<=0.05 MIU/L-ACNC: 2.66 UIU/ML (ref 0.27–4.2)

## 2021-07-08 PROCEDURE — G8420 CALC BMI NORM PARAMETERS: HCPCS | Performed by: FAMILY MEDICINE

## 2021-07-08 PROCEDURE — 84443 ASSAY THYROID STIM HORMONE: CPT

## 2021-07-08 PROCEDURE — 99214 OFFICE O/P EST MOD 30 MIN: CPT | Performed by: FAMILY MEDICINE

## 2021-07-08 PROCEDURE — 36415 COLL VENOUS BLD VENIPUNCTURE: CPT

## 2021-07-08 PROCEDURE — 3017F COLORECTAL CA SCREEN DOC REV: CPT | Performed by: FAMILY MEDICINE

## 2021-07-08 PROCEDURE — 73630 X-RAY EXAM OF FOOT: CPT

## 2021-07-08 PROCEDURE — G8427 DOCREV CUR MEDS BY ELIG CLIN: HCPCS | Performed by: FAMILY MEDICINE

## 2021-07-08 PROCEDURE — 1036F TOBACCO NON-USER: CPT | Performed by: FAMILY MEDICINE

## 2021-07-08 SDOH — ECONOMIC STABILITY: FOOD INSECURITY: WITHIN THE PAST 12 MONTHS, THE FOOD YOU BOUGHT JUST DIDN'T LAST AND YOU DIDN'T HAVE MONEY TO GET MORE.: NEVER TRUE

## 2021-07-08 SDOH — ECONOMIC STABILITY: FOOD INSECURITY: WITHIN THE PAST 12 MONTHS, YOU WORRIED THAT YOUR FOOD WOULD RUN OUT BEFORE YOU GOT MONEY TO BUY MORE.: NEVER TRUE

## 2021-07-08 ASSESSMENT — SOCIAL DETERMINANTS OF HEALTH (SDOH): HOW HARD IS IT FOR YOU TO PAY FOR THE VERY BASICS LIKE FOOD, HOUSING, MEDICAL CARE, AND HEATING?: NOT HARD AT ALL

## 2021-07-08 NOTE — PROGRESS NOTES
7/19/2021    Pérez Lu is a 63 y.o. femalehere for:    HPI:  CC- hypothyroidism, asthma    - Hypothyroidism  Taking synthroid 50 mcg qd- recently adjusted   Adherent   No side effects  Asymptomatic      - Asthma  Flovent 110 mcg 1 puff twice daily for asthma. Noexacerbations. She uses inhalers as prescribed. Has not had nighttime cough. Avoids heat, hot weather  Albuterol only when hot weather, once in 2 weeks      - Foot pain  Right foot  About 1 week ago when she was in the kitchen, something very heavy fell over dorsum of her foot  Immediately hurt a lot, but then kind of improved. Saw swelling which improved somewhat  Recently has noticed that hurts a lot when running  Yesterday almost fell, because she ran and elicited pain that made her unable to bear weight  If just walking normally, able to bear weight   No focal deficits. BP Readings from Last 3 Encounters:   07/08/21 138/77   03/25/21 135/78   03/02/21 134/84     Current Outpatient Medications   Medication Sig Dispense Refill    FLOVENT  MCG/ACT inhaler TAKE 1 PUFF BY MOUTH TWICE A DAY 1 Inhaler 0    albuterol sulfate  (90 Base) MCG/ACT inhaler INHALE 2 PUFFS BY MOUTH EVERY 6 HOURS AS NEEDED FOR SHORTNESS OF BREATH 6.7 Inhaler 0    escitalopram (LEXAPRO) 10 MG tablet TAKE 1 TABLET BY MOUTH EVERY DAY 90 tablet 0    mirtazapine (REMERON) 7.5 MG tablet TAKE 1 TABLET BY MOUTH NIGHTLY FOR INSOMNIA 30 tablet 2    levothyroxine (SYNTHROID) 50 MCG tablet Take 1 tablet by mouth Daily 90 tablet 1    vitamin D (CVS D3) 25 MCG (1000 UT) CAPS TAKE 1 CAPSULE DAILY AS DIRECTED 30 capsule 11    Multiple Vitamin (DAILY-RAVEN) TABS TAKE 1 TABLET BY MOUTH EVERY DAY 90 tablet 3    butalbital-aspirin-caffeine (FIORINAL) -40 MG capsule Take 1 capsule by mouth daily as needed for Headaches (severe headache) for up to 360 days.  30 capsule 1    cyanocobalamin 1000 MCG/ML injection Infuse intravenously every 30 days        No older Afluria) 10/25/2016, 10/20/2017, 11/16/2018, 02/28/2020, 10/07/2020    Pneumococcal Polysaccharide ([id number]) 03/29/2013    Tdap (Boostrix, Adacel) 05/12/2015       Review of Systems   Constitutional: Negative for chills and fever. HENT: Negative for congestion. Respiratory: Negative for cough, shortness of breath and wheezing. Cardiovascular: Negative for chest pain and leg swelling. Gastrointestinal: Negative for abdominal pain and vomiting. Endocrine: Negative for cold intolerance, heat intolerance and polyuria. Genitourinary: Negative for dysuria and hematuria. Musculoskeletal: Positive for arthralgias (foot pain). Negative for neck pain and neck stiffness. Skin: Negative for rash and wound. Neurological: Negative for weakness, numbness and headaches. Psychiatric/Behavioral: Negative for agitation and confusion. VS:  /77   Pulse 68   Temp 98.1 °F (36.7 °C) (Temporal)   Resp 16   Ht 5' 4\" (1.626 m)   Wt 141 lb (64 kg)   LMP 01/02/2015   SpO2 97%   BMI 24.20 kg/m²     Physical Exam  Vitals reviewed. Constitutional:       General: She is not in acute distress. Appearance: She is not ill-appearing. HENT:      Head: Normocephalic and atraumatic. Eyes:      General: No scleral icterus. Cardiovascular:      Rate and Rhythm: Normal rate and regular rhythm. Heart sounds: Normal heart sounds. No murmur heard. Pulmonary:      Effort: Pulmonary effort is normal. No respiratory distress. Breath sounds: Normal breath sounds. No wheezing. Abdominal:      General: There is no distension. Palpations: Abdomen is soft. Tenderness: There is no abdominal tenderness. Musculoskeletal:         General: Tenderness (over dorsum of the right foot. no bony tenderness. mild swelling noted. pulses intact) present. Cervical back: Normal range of motion and neck supple. Right lower leg: No edema. Left lower leg: No edema.    Skin: General: Skin is warm. Findings: No rash. Neurological:      General: No focal deficit present. Mental Status: She is alert and oriented to person, place, and time. Sensory: No sensory deficit. Motor: No weakness. Psychiatric:         Mood and Affect: Mood normal.         Behavior: Behavior normal.         Assessment/Plan:  Grover Pryor was seen today for asthma and hypothyroidism. Diagnoses and all orders for this visit:    Mild persistent asthma without complication  Well controlled. Continue same     Congenital hypothyroidism with diffuse goiter  Recently adjusted synthroid. Recheck TSH  -     TSH without Reflex; Future    Right foot pain  Concern for fracture. XR  -     XR FOOT RIGHT (MIN 3 VIEWS); Future      Follow up:  3 months for asthma, hypothyroidism     Patient agrees with the above stated plan.     Tangela Dominguez MD  PGY-3 Family Medicine

## 2021-07-08 NOTE — PROGRESS NOTES
S: 63 y.o. female with   Chief Complaint   Patient presents with    Asthma    Hypothyroidism       Pt is here for hypothyroid and her asthma. She is now taking her new dose of synthroid. Has not been using her albuterol   Something fell on her right foot one weeks ago. Pain went away but now it hurts to run. O: VS:  height is 5' 4\" (1.626 m) and weight is 141 lb (64 kg). Her temporal temperature is 98.1 °F (36.7 °C). Her blood pressure is 138/77 and her pulse is 68. Her respiration is 16 and oxygen saturation is 97%. BP Readings from Last 3 Encounters:   07/08/21 138/77   03/25/21 135/78   03/02/21 134/84     See resident note      Impression/Plan:   1) hypothyroidism - pt ed on how to take her meds. 2) asthma - stable on current meds. 3) foot trauma - x-ray. If not gone within a week, to podiatry. Health Maintenance Due   Topic Date Due    COVID-19 Vaccine (1) Never done    HIV screen  Never done    Shingles Vaccine (1 of 2) Never done         Attending Physician Statement  I have discussed the case, including pertinent history and exam findings with the resident. I also have seen the patient and performed key portions of the examination. I agree with the documented assessment and plan.       Lor Sweeney MD

## 2021-07-19 ASSESSMENT — ENCOUNTER SYMPTOMS
COUGH: 0
WHEEZING: 0
VOMITING: 0
SHORTNESS OF BREATH: 0
ABDOMINAL PAIN: 0

## 2021-07-22 DIAGNOSIS — J45.30 MILD PERSISTENT ASTHMA WITHOUT COMPLICATION: ICD-10-CM

## 2021-07-22 RX ORDER — ALBUTEROL SULFATE 90 UG/1
AEROSOL, METERED RESPIRATORY (INHALATION)
Qty: 6.7 INHALER | Refills: 3 | Status: SHIPPED
Start: 2021-07-22 | End: 2021-10-19 | Stop reason: SDUPTHER

## 2021-08-30 DIAGNOSIS — G43.109 MIGRAINE WITH AURA AND WITHOUT STATUS MIGRAINOSUS, NOT INTRACTABLE: Chronic | ICD-10-CM

## 2021-08-30 DIAGNOSIS — J45.30 MILD PERSISTENT ASTHMA WITHOUT COMPLICATION: ICD-10-CM

## 2021-08-30 DIAGNOSIS — G47.00 INSOMNIA, UNSPECIFIED TYPE: ICD-10-CM

## 2021-08-30 DIAGNOSIS — F32.89 OTHER DEPRESSION: ICD-10-CM

## 2021-08-30 DIAGNOSIS — E03.0 CONGENITAL HYPOTHYROIDISM WITH DIFFUSE GOITER: ICD-10-CM

## 2021-08-31 RX ORDER — LEVOTHYROXINE SODIUM 0.05 MG/1
50 TABLET ORAL DAILY
Qty: 90 TABLET | Refills: 0 | Status: SHIPPED
Start: 2021-08-31 | End: 2021-10-19 | Stop reason: SDUPTHER

## 2021-08-31 RX ORDER — FLUTICASONE PROPIONATE 110 UG/1
AEROSOL, METERED RESPIRATORY (INHALATION)
Qty: 1 INHALER | Refills: 1 | Status: SHIPPED
Start: 2021-08-31 | End: 2021-10-19 | Stop reason: SDUPTHER

## 2021-08-31 RX ORDER — ESCITALOPRAM OXALATE 10 MG/1
10 TABLET ORAL DAILY
Qty: 90 TABLET | Refills: 0 | Status: SHIPPED
Start: 2021-08-31 | End: 2021-10-19 | Stop reason: SDUPTHER

## 2021-08-31 RX ORDER — MIRTAZAPINE 7.5 MG/1
TABLET, FILM COATED ORAL
Qty: 90 TABLET | Refills: 0 | Status: SHIPPED
Start: 2021-08-31 | End: 2021-10-19 | Stop reason: SDUPTHER

## 2021-10-19 ENCOUNTER — OFFICE VISIT (OUTPATIENT)
Dept: FAMILY MEDICINE CLINIC | Age: 66
End: 2021-10-19
Payer: MEDICARE

## 2021-10-19 VITALS
DIASTOLIC BLOOD PRESSURE: 70 MMHG | WEIGHT: 140 LBS | RESPIRATION RATE: 16 BRPM | HEART RATE: 62 BPM | BODY MASS INDEX: 23.9 KG/M2 | HEIGHT: 64 IN | TEMPERATURE: 97.2 F | SYSTOLIC BLOOD PRESSURE: 130 MMHG | OXYGEN SATURATION: 98 %

## 2021-10-19 DIAGNOSIS — F32.89 OTHER DEPRESSION: Primary | ICD-10-CM

## 2021-10-19 DIAGNOSIS — G47.00 INSOMNIA, UNSPECIFIED TYPE: ICD-10-CM

## 2021-10-19 DIAGNOSIS — E03.0 CONGENITAL HYPOTHYROIDISM WITH DIFFUSE GOITER: ICD-10-CM

## 2021-10-19 DIAGNOSIS — Z12.11 SCREENING FOR MALIGNANT NEOPLASM OF COLON: ICD-10-CM

## 2021-10-19 DIAGNOSIS — D56.0 ALPHA-THALASSEMIA (HCC): ICD-10-CM

## 2021-10-19 DIAGNOSIS — Z23 NEED FOR INFLUENZA VACCINATION: ICD-10-CM

## 2021-10-19 DIAGNOSIS — G43.109 MIGRAINE WITH AURA AND WITHOUT STATUS MIGRAINOSUS, NOT INTRACTABLE: Chronic | ICD-10-CM

## 2021-10-19 DIAGNOSIS — J45.30 MILD PERSISTENT ASTHMA WITHOUT COMPLICATION: ICD-10-CM

## 2021-10-19 PROCEDURE — G8482 FLU IMMUNIZE ORDER/ADMIN: HCPCS | Performed by: FAMILY MEDICINE

## 2021-10-19 PROCEDURE — G8427 DOCREV CUR MEDS BY ELIG CLIN: HCPCS | Performed by: FAMILY MEDICINE

## 2021-10-19 PROCEDURE — G8420 CALC BMI NORM PARAMETERS: HCPCS | Performed by: FAMILY MEDICINE

## 2021-10-19 PROCEDURE — 99213 OFFICE O/P EST LOW 20 MIN: CPT | Performed by: FAMILY MEDICINE

## 2021-10-19 PROCEDURE — G0008 ADMIN INFLUENZA VIRUS VAC: HCPCS | Performed by: FAMILY MEDICINE

## 2021-10-19 PROCEDURE — 1036F TOBACCO NON-USER: CPT | Performed by: FAMILY MEDICINE

## 2021-10-19 PROCEDURE — 90674 CCIIV4 VAC NO PRSV 0.5 ML IM: CPT | Performed by: FAMILY MEDICINE

## 2021-10-19 PROCEDURE — 3017F COLORECTAL CA SCREEN DOC REV: CPT | Performed by: FAMILY MEDICINE

## 2021-10-19 RX ORDER — LEVOTHYROXINE SODIUM 0.05 MG/1
50 TABLET ORAL DAILY
Qty: 90 TABLET | Refills: 0 | Status: SHIPPED
Start: 2021-10-19 | End: 2022-03-17

## 2021-10-19 RX ORDER — CHOLECALCIFEROL (VITAMIN D3) 25 MCG
CAPSULE ORAL
Qty: 90 CAPSULE | Refills: 3 | Status: SHIPPED | OUTPATIENT
Start: 2021-10-19

## 2021-10-19 RX ORDER — MIRTAZAPINE 7.5 MG/1
TABLET, FILM COATED ORAL
Qty: 90 TABLET | Refills: 0 | Status: SHIPPED
Start: 2021-10-19 | End: 2022-02-01

## 2021-10-19 RX ORDER — ALBUTEROL SULFATE 90 UG/1
AEROSOL, METERED RESPIRATORY (INHALATION)
Qty: 1 EACH | Refills: 3 | Status: SHIPPED | OUTPATIENT
Start: 2021-10-19

## 2021-10-19 RX ORDER — MULTIVITAMIN WITH FOLIC ACID 400 MCG
TABLET ORAL
Qty: 90 TABLET | Refills: 3 | Status: SHIPPED | OUTPATIENT
Start: 2021-10-19

## 2021-10-19 RX ORDER — ESCITALOPRAM OXALATE 10 MG/1
10 TABLET ORAL DAILY
Qty: 90 TABLET | Refills: 0 | Status: SHIPPED
Start: 2021-10-19 | End: 2022-02-01

## 2021-10-19 RX ORDER — FLUTICASONE PROPIONATE 110 UG/1
AEROSOL, METERED RESPIRATORY (INHALATION)
Qty: 1 EACH | Refills: 3 | Status: SHIPPED
Start: 2021-10-19 | End: 2022-03-15

## 2021-10-19 NOTE — PROGRESS NOTES
10/19/2021    Kenneth Harrell is a 61 y.o. female here for   Chief Complaint   Patient presents with    Hypothyroidism    Asthma    Insomnia     patient states she is not sleeping due to something biting her at night      Here for routine f/u and management of chronic conditions  She has not been sleeping because she has been having some bites. This is causing her poor sleep an d anxiety.  will come every 3 months  She has been going through closets and sorting things out  Will be cleaning crpets  Will get mattress   Wt Readings from Last 3 Encounters:   10/19/21 140 lb (63.5 kg)   07/08/21 141 lb (64 kg)   03/25/21 137 lb (62.1 kg)     Regarding hypothyroidism, this problem is chronic. This is under excellent control on current medication regimen, Levothyroxine 50 mcg. Current symptoms include fatigue. she denies feeling excessive energy, tremulousness, palpitations and is  taking her medication consistently on an empty stomach. Most recent labs reviewed with patient. TSH:    Lab Results   Component Value Date    TSH 2.660 07/08/2021      Will sit in the carport  Getting a new mattress    BP Readings from Last 3 Encounters:   10/19/21 (!) 158/79   07/08/21 138/77   03/25/21 135/78       She  reports that she quit smoking about 3 years ago. Her smoking use included cigarettes. She has a 0.50 pack-year smoking history. She has never used smokeless tobacco.    Medications and allergies reviewed and updated in chart.     Current Outpatient Medications   Medication Sig Dispense Refill    mirtazapine (REMERON) 7.5 MG tablet TAKE 1 TABLET BY MOUTH NIGHTLY FOR INSOMNIA 90 tablet 0    fluticasone (FLOVENT HFA) 110 MCG/ACT inhaler TAKE 1 PUFF BY MOUTH TWICE A DAY 1 Inhaler 1    escitalopram (LEXAPRO) 10 MG tablet Take 1 tablet by mouth daily 90 tablet 0    levothyroxine (SYNTHROID) 50 MCG tablet Take 1 tablet by mouth Daily 90 tablet 0    butalbital-aspirin-caffeine (FIORINAL) -40 MG capsule Take 1 capsule by mouth daily as needed for Headaches or Migraine (severe headache) for up to 90 days. 30 capsule 2    albuterol sulfate  (90 Base) MCG/ACT inhaler INHALE 2 PUFFS BY MOUTH EVERY 6 HOURS AS NEEDED FOR SHORTNESS OF BREATH 6.7 Inhaler 3    vitamin D (CVS D3) 25 MCG (1000 UT) CAPS TAKE 1 CAPSULE DAILY AS DIRECTED 30 capsule 11    Multiple Vitamin (DAILY-RAVEN) TABS TAKE 1 TABLET BY MOUTH EVERY DAY 90 tablet 3    cyanocobalamin 1000 MCG/ML injection Infuse intravenously every 30 days        No current facility-administered medications for this visit. Facility-Administered Medications Ordered in Other Visits   Medication Dose Route Frequency Provider Last Rate Last Admin    sodium chloride (PF) 0.9 % injection 10 mL  10 mL IntraVENous PRN Cong Yu MD            Patient'spast medical, surgical, social and/or family history reviewed, updated in chart, and are non-contributory (unless otherwise stated). Review of Systems  Review of Systems   Constitutional: Negative for chills, diaphoresis and fever. Eyes: Negative for visual disturbance. Respiratory: Negative for shortness of breath and wheezing. Cardiovascular: Negative for chest pain. Gastrointestinal: Negative for constipation and diarrhea. Skin:        'bites'   Neurological: Negative for dizziness. Psychiatric/Behavioral: Positive for dysphoric mood and sleep disturbance. PE:  VS:  BP (!) 158/79   Pulse 62   Temp 97.2 °F (36.2 °C) (Temporal)   Resp 16   Ht 5' 4\" (1.626 m)   Wt 140 lb (63.5 kg)   LMP 01/02/2015   SpO2 98%   Breastfeeding No   BMI 24.03 kg/m²   Physical Exam  Constitutional:       Appearance: She is well-developed. HENT:      Head: Normocephalic and atraumatic. Cardiovascular:      Rate and Rhythm: Normal rate and regular rhythm. Heart sounds: No murmur heard. No friction rub. No gallop.     Pulmonary:      Effort: Pulmonary effort is normal.      Breath sounds: Normal breath sounds. No wheezing or rales. Skin:     General: Skin is warm and dry. Neurological:      Mental Status: She is alert and oriented to person, place, and time. Assessment/Plan:  Malu Lewis was seen today for hypothyroidism, asthma and insomnia. Diagnoses and all orders for this visit:    Other depression  Denies ongoing mood issues though fatigued and not sleeping today, she has stressor currently which is primary factor  Cont lexapro  Cont to monitor  -     escitalopram (LEXAPRO) 10 MG tablet; Take 1 tablet by mouth daily  -     CBC Auto Differential; Future  -     COMPREHENSIVE METABOLIC PANEL; Future    Insomnia, unspecified type  Cont remeron  -     mirtazapine (REMERON) 7.5 MG tablet; TAKE 1 TABLET BY MOUTH NIGHTLY FOR INSOMNIA    Congenital hypothyroidism with diffuse goiter  Last tsh at goal  -     levothyroxine (SYNTHROID) 50 MCG tablet; Take 1 tablet by mouth Daily  -     CBC Auto Differential; Future  -     COMPREHENSIVE METABOLIC PANEL; Future    Mild persistent asthma without complication  Doing well with ics daily  -     fluticasone (FLOVENT HFA) 110 MCG/ACT inhaler; TAKE 1 PUFF BY MOUTH TWICE A DAY  -     albuterol sulfate  (90 Base) MCG/ACT inhaler; 1INHALE 2 PUFFS BY MOUTH EVERY 6 HOURS AS NEEDED FOR SHORTNESS OF BREATH  (ok to dispense brand covered by insurance)  -     CBC Auto Differential; Future  -     COMPREHENSIVE METABOLIC PANEL; Future    Migraine with aura and without status migrainosus, not intractable  Aggravated recently by poor sleep  -     escitalopram (LEXAPRO) 10 MG tablet; Take 1 tablet by mouth daily  -     vitamin D (CVS D3) 25 MCG (1000 UT) CAPS; TAKE 1 CAPSULE DAILY AS DIRECTED  -     Multiple Vitamin (DAILY-RAVEN) TABS; TAKE 1 TABLET BY MOUTH EVERY DAY  -     CBC Auto Differential; Future  -     COMPREHENSIVE METABOLIC PANEL; Future    Screening for malignant neoplasm of colon  -     Cologuard (For External Results Only);  Future    Need for influenza vaccination  -     INFLUENZA, MDCK QUADV, 2 YRS AND OLDER, IM, PF, PREFILL SYR OR SDV, 0.5ML (FLUCELVAX QUADV, PF)    Alpha-thalassemia ([facility] [address]  Repeat cbc        Return in about 4 months (around 2/19/2022). Advised patient to call with any new medication issues. All questions answered.   Call or go to emergency department ifsymptoms worsen or persist.

## 2021-10-20 ASSESSMENT — ENCOUNTER SYMPTOMS
WHEEZING: 0
CONSTIPATION: 0
DIARRHEA: 0
SHORTNESS OF BREATH: 0

## 2021-10-21 ENCOUNTER — TELEPHONE (OUTPATIENT)
Dept: FAMILY MEDICINE CLINIC | Age: 66
End: 2021-10-21

## 2021-10-22 RX ORDER — TRIAMCINOLONE ACETONIDE 0.25 MG/G
CREAM TOPICAL
Qty: 15 G | Refills: 0 | Status: SHIPPED | OUTPATIENT
Start: 2021-10-22

## 2021-10-22 NOTE — TELEPHONE ENCOUNTER
Left detailed message for patient stating that a medication was sent for her to use on the bug bites

## 2022-01-24 ENCOUNTER — OFFICE VISIT (OUTPATIENT)
Dept: NEUROLOGY | Age: 67
End: 2022-01-24
Payer: MEDICARE

## 2022-01-24 VITALS
HEIGHT: 64 IN | RESPIRATION RATE: 18 BRPM | BODY MASS INDEX: 23.9 KG/M2 | TEMPERATURE: 97.4 F | HEART RATE: 75 BPM | WEIGHT: 140 LBS | OXYGEN SATURATION: 97 %
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
## 2022-01-24 DIAGNOSIS — G43.101 MIGRAINE WITH AURA AND WITH STATUS MIGRAINOSUS, NOT INTRACTABLE: Primary | ICD-10-CM

## 2022-01-24 DIAGNOSIS — M25.572 ACUTE LEFT ANKLE PAIN: ICD-10-CM

## 2022-01-24 PROCEDURE — G8427 DOCREV CUR MEDS BY ELIG CLIN: HCPCS | Performed by: NURSE PRACTITIONER

## 2022-01-24 PROCEDURE — 1036F TOBACCO NON-USER: CPT | Performed by: NURSE PRACTITIONER

## 2022-01-24 PROCEDURE — G8482 FLU IMMUNIZE ORDER/ADMIN: HCPCS | Performed by: NURSE PRACTITIONER

## 2022-01-24 PROCEDURE — 3017F COLORECTAL CA SCREEN DOC REV: CPT | Performed by: NURSE PRACTITIONER

## 2022-01-24 PROCEDURE — G8420 CALC BMI NORM PARAMETERS: HCPCS | Performed by: NURSE PRACTITIONER

## 2022-01-24 PROCEDURE — 99214 OFFICE O/P EST MOD 30 MIN: CPT | Performed by: NURSE PRACTITIONER

## 2022-01-24 RX ORDER — BUTALBITAL, ASPIRIN, AND CAFFEINE 325; 50; 40 MG/1; MG/1; MG/1
1 CAPSULE ORAL DAILY PRN
Qty: 30 CAPSULE | Refills: 2 | Status: SHIPPED
Start: 2022-01-24 | End: 2022-01-24 | Stop reason: SDUPTHER

## 2022-01-24 RX ORDER — BUTALBITAL, ASPIRIN, AND CAFFEINE 325; 50; 40 MG/1; MG/1; MG/1
1 CAPSULE ORAL DAILY PRN
Qty: 30 CAPSULE | Refills: 1 | Status: SHIPPED | OUTPATIENT
Start: 2022-01-24 | End: 2022-06-02

## 2022-01-24 NOTE — PATIENT INSTRUCTIONS
Patient Education        rimegepant  Pronunciation:  silvina MARIE rafael sherif  Brand:  Nurtec ODT  What is the most important information I should know about rimegepant? Follow all directions on your medicine label and package. Tell each of your healthcare providers about all your medical conditions, allergies, and all medicines you use. What is rimegepant? Rimegepant is used in adults to treat a migraine headache with or without aura. Rimegepant will not prevent  migraines. Rimegepant may also be used for purposes not listed in this medication guide. What should I discuss with my healthcare provider before taking rimegepant? You should not use rimegepant if you are allergic to it. Tell your doctor if you have ever had:  · liver disease; or  · kidney disease (or if you are on dialysis). It is not known whether rimegepant will harm an unborn baby. However, having migraine headaches may cause complications during pregnancy such as dangerously high blood pressure that can lead to medical problems in both mother and baby. The benefit of treating migraines may outweigh any risks to the baby. It may not be safe to breastfeed while using this medicine. Ask your doctor about any risk. Rimegepant is not approved for use by anyone younger than 25years old. How should I take rimegepant? Follow all directions on your prescription label and read all medication guides or instruction sheets. Use the medicine exactly as directed. Call your doctor if your symptoms do not improve, or if you have more than 15 headaches in one month (30 days). Remove an orally disintegrating tablet from the package only when you are ready to take the medicine. Place the tablet on or under your tongue and allow it to dissolve, without chewing. Swallow several times as the tablet dissolves. Store at cool room temperature, away from moisture and heat. What happens if I miss a dose?   Since rimegepant is used when needed, it does not have a daily up-to-date, and complete, but no guarantee is made to that effect. Drug information contained herein may be time sensitive. [facility] information has been compiled for use by healthcare practitioners and consumers in the [place] and therefore [employer] does not warrant that uses outside of the [place] are appropriate, unless specifically indicated otherwise. [facility]'s drug information does not endorse drugs, diagnose patients or recommend therapy. [facility] drug information is an informational resource designed to assist licensed healthcare practitioners in caring for their patients and/or to serve consumers viewing this service as a supplement to, and not a substitute for, the expertise, skill, knowledge and judgment of healthcare practitioners. The absence of a warning for a given drug or drug combination in no way should be construed to indicate that the drug or drug combination is safe, effective or appropriate for any given patient. [facility] does not assume any responsibility for any aspect of healthcare administered with the aid of information [facility] provides. The information contained herein is not intended to cover all possible uses, directions, precautions, warnings, drug interactions, allergic reactions, or adverse effects. If you have questions about the drugs you are taking, check with your doctor, nurse or pharmacist.  Copyright 1729-8768 [address]. Version: 1.01. Revision date: 3/27/2020. Care instructions adapted under license by [facility] ([facility]). If you have questions about a medical condition or this instruction, always ask your healthcare professional. [place] [address] any warranty or liability for your use of this information.

## 2022-01-24 NOTE — PROGRESS NOTES
[facility]. Gabriela Barone M.D., F.A.C.P. Kristina Ulrich, REBECCA, APRN, CNS  Braulio Crenshaw. Collin Barkley, MSN, APRN-FNP-C  Carol Gutierrez MSN, APRN, FNP-C  Belen RAMIREZ, CHRIS  [address] MSN, APRN, FNP-C  [address], [facility]  [place], [address] [place]  Phone: [phone]  Fax: [phone]           Black [place] is a 61 y.o. right handed female     We are seeing her for her yearly follow-up for migraines    She presents alone and is a good historian    Unfortunately, she is still having about 8 migraines per month. Fiorinal is saved for severe headaches and effective, but she will often not take anything and just suffer through them. She is already on SSRI. Medications failed: Amitriptyline caused sedation. Triptans are contraindicated with her SSRI use. [place], she had a mechanical  fall down about 6 steps. She did not hit her head. She felt her left ankle twist, and has been having pain in the medial and posterior aspects ever since. She has an appointment with PCP next month. No other new medical issues. She is not yet vaccinated from Covid, but is considering it.   No other new medical issues    No chest pain or palpitations  No SOB  No vertigo, lightheadedness or loss of consciousness  No incontinence of bowels or bladder   No itching or bruising appreciated  No speech or swallowing troubles  No focal limb weakness or paresthesia    ROS otherwise negative     Medications:     Current Outpatient Medications   Medication Sig Dispense Refill    triamcinolone (KENALOG) 0.025 % cream Apply topically 2 times daily for itching associated with insect bite 15 g 0    mirtazapine (REMERON) 7.5 MG tablet TAKE 1 TABLET BY MOUTH NIGHTLY FOR INSOMNIA 90 tablet 0    levothyroxine (SYNTHROID) 50 MCG tablet Take 1 tablet by mouth Daily 90 tablet 0    fluticasone (FLOVENT HFA) 110 MCG/ACT inhaler TAKE 1 PUFF BY MOUTH TWICE A DAY 1 each 3    escitalopram (LEXAPRO) 10 MG tablet Take 1 tablet by mouth daily 90 tablet 0    albuterol sulfate  (90 Base) MCG/ACT inhaler 1INHALE 2 PUFFS BY MOUTH EVERY 6 HOURS AS NEEDED FOR SHORTNESS OF BREATH  (ok to dispense brand covered by insurance) 1 each 3    vitamin D (CVS D3) 25 MCG (1000 UT) CAPS TAKE 1 CAPSULE DAILY AS DIRECTED 90 capsule 3    Multiple Vitamin (DAILY-RAVEN) TABS TAKE 1 TABLET BY MOUTH EVERY DAY 90 tablet 3    butalbital-aspirin-caffeine (FIORINAL) -40 MG capsule Take 1 capsule by mouth daily as needed for Headaches or Migraine (severe headache) for up to 90 days. 30 capsule 2    cyanocobalamin 1000 MCG/ML injection Infuse intravenously every 30 days        No current facility-administered medications for this visit.      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  Neck: goiter noted; full ROM  Lungs: clear to auscultation bilaterally  Heart: regular rate and rhythm--no murmur  Extremities: contractures R thumb and index finger; tenderness to medial malleolus on left  Pulses: 2+ and symmetric  Skin:  color, texture, turgor normal--no rashes or lesions    Mental Status: alert and oriented x 4---very pleasant as always    Appropriate attention/concentration  Intact fundus of knowledge  Memories intact    Speech: no dysarthria  Language: no aphasias    Cranial Nerves:  I: smell    II: visual acuity  Blind OS   II: visual fields effective    Continue PRN Fiorinal--refills given    X-ray left ankle until seen by PCP    Routine labs next visit if not already done    Strongly encouraged Covid vaccine    Headache diary and avoidance of headache triggers    RTO in 6 mos or sooner PRN    HERI Cadena - CNP, [facility]  8:05 AM  1/24/2022

## 2022-01-27 ENCOUNTER — HOSPITAL ENCOUNTER (OUTPATIENT)
Dept: GENERAL RADIOLOGY | Age: 67
Discharge: HOME | End: 2022-01-29
Payer: MEDICARE

## 2022-01-27 ENCOUNTER — HOSPITAL ENCOUNTER (OUTPATIENT)
Age: 67
Discharge: HOME | End: 2022-01-29
Payer: MEDICARE

## 2022-01-27 ENCOUNTER — TELEPHONE (OUTPATIENT)
Dept: NEUROLOGY | Age: 67
End: 2022-01-27
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
## 2022-01-27 DIAGNOSIS — M25.572 ACUTE LEFT ANKLE PAIN: ICD-10-CM

## 2022-01-27 PROCEDURE — 73600 X-RAY EXAM OF ANKLE: CPT

## 2022-01-27 NOTE — TELEPHONE ENCOUNTER
Spoke with Stevenson Hancock regarding her L ankle fracture. Advised her to go to the orthopedic urgent care in [facility] today to get it addressed.

## 2022-01-28 NOTE — TELEPHONE ENCOUNTER
Please call patient and touch base with her - confirm that she was seen by orthopedics as advised. (ankle fracture)

## 2022-01-29 DIAGNOSIS — G47.00 INSOMNIA, UNSPECIFIED TYPE: ICD-10-CM

## 2022-01-29 DIAGNOSIS — F32.89 OTHER DEPRESSION: ICD-10-CM

## 2022-01-29 DIAGNOSIS — G43.109 MIGRAINE WITH AURA AND WITHOUT STATUS MIGRAINOSUS, NOT INTRACTABLE: Chronic | ICD-10-CM

## 2022-01-31 ENCOUNTER — TELEPHONE (OUTPATIENT)
Dept: FAMILY MEDICINE CLINIC | Age: 67
End: 2022-01-31

## 2022-01-31 DIAGNOSIS — S82.65XG CLOSED NONDISPLACED FRACTURE OF LATERAL MALLEOLUS OF LEFT FIBULA WITH DELAYED HEALING, SUBSEQUENT ENCOUNTER: Primary | ICD-10-CM

## 2022-02-01 PROBLEM — S82.66XG: Status: ACTIVE | Noted: 2022-02-01

## 2022-02-01 RX ORDER — ESCITALOPRAM OXALATE 10 MG/1
TABLET ORAL
Qty: 90 TABLET | Refills: 0 | Status: SHIPPED
Start: 2022-02-01 | End: 2022-04-28

## 2022-02-01 RX ORDER — MIRTAZAPINE 7.5 MG/1
TABLET, FILM COATED ORAL
Qty: 90 TABLET | Refills: 0 | Status: SHIPPED
Start: 2022-02-01 | End: 2022-04-26

## 2022-02-01 NOTE — TELEPHONE ENCOUNTER
General (Pt needs a referrel sent over to [address] [facility]. Her neurologist sent her to [address] [place] [address] to get it checked and she went to [facility] and got an x ray but has heard nothing because they don't know anything because we didn't know a referrel wasn't sent. Her x ray results are in her chart.)      Referral sent as urgent. Thanks.

## 2022-02-09 ENCOUNTER — TELEPHONE (OUTPATIENT)
Dept: FAMILY MEDICINE CLINIC | Age: 67
End: 2022-02-09

## 2022-02-09 DIAGNOSIS — Z12.31 ENCOUNTER FOR SCREENING MAMMOGRAM FOR BREAST CANCER: Primary | ICD-10-CM

## 2022-02-09 NOTE — TELEPHONE ENCOUNTER
Patient called and wants to know if you can place order for her mammogram. She got a card from Amrki Cárdenas to schedule.      Order pending your review/approval

## 2022-02-10 ENCOUNTER — TELEPHONE (OUTPATIENT)
Dept: FAMILY MEDICINE CLINIC | Age: 67
End: 2022-02-10

## 2022-02-10 NOTE — TELEPHONE ENCOUNTER
Please contact patient and/or orthopedics office to followup on referral for ankle fracture  Patient may need assistance with scheduling if this has not yet been done.

## 2022-02-10 NOTE — TELEPHONE ENCOUNTER
I spoke to pt yesterday and I faxed over the x ray and she was making an appt after they look at them

## 2022-02-11 ENCOUNTER — TELEPHONE (OUTPATIENT)
Dept: FAMILY MEDICINE CLINIC | Age: 67
End: 2022-02-11

## 2022-02-16 ENCOUNTER — TELEPHONE (OUTPATIENT)
Dept: FAMILY MEDICINE CLINIC | Age: 67
End: 2022-02-16

## 2022-02-16 NOTE — TELEPHONE ENCOUNTER
Pt went to [address] [place] [address],[address] and they said it was chipped not broken. Told her to wrap it , get an insole and do pt.  Her first pt appt is 04/21

## 2022-02-18 ENCOUNTER — TELEPHONE (OUTPATIENT)
Dept: FAMILY MEDICINE CLINIC | Age: 67
End: 2022-02-18

## 2022-02-18 NOTE — TELEPHONE ENCOUNTER
Anand went to [facility] for consult and was told that she scraped her ankle. They suggested PT and set her up for April 20th at their office. Patient wants to know if doctor wants her to wait that long, or will doctor make referral to another physical therapy location.

## 2022-02-18 NOTE — TELEPHONE ENCOUNTER
Please call patient - is she walking on the ankle, is there severe pain or has it started to improve? Is there any other big concern.   Also where does she want to go for PT?

## 2022-02-21 NOTE — TELEPHONE ENCOUNTER
Spoke with pt last week, has pt in April and the ankle is still painful but but better wrapped.  Left note for Dr Esa Warner last week

## 2022-02-25 ENCOUNTER — CARE COORDINATION (OUTPATIENT)
Dept: CARE COORDINATION | Age: 67
End: 2022-02-25

## 2022-02-25 ENCOUNTER — OFFICE VISIT (OUTPATIENT)
Dept: FAMILY MEDICINE CLINIC | Age: 67
End: 2022-02-25
Payer: MEDICARE

## 2022-02-25 VITALS
WEIGHT: 136 LBS | HEIGHT: 64 IN | HEART RATE: 73 BPM | SYSTOLIC BLOOD PRESSURE: 162 MMHG | DIASTOLIC BLOOD PRESSURE: 82 MMHG | BODY MASS INDEX: 23.22 KG/M2 | TEMPERATURE: 96.6 F | OXYGEN SATURATION: 100 %

## 2022-02-25 DIAGNOSIS — R03.0 ELEVATED BP WITHOUT DIAGNOSIS OF HYPERTENSION: ICD-10-CM

## 2022-02-25 DIAGNOSIS — R29.6 RECURRENT FALLS: Primary | ICD-10-CM

## 2022-02-25 DIAGNOSIS — D56.0 ALPHA-THALASSEMIA (HCC): ICD-10-CM

## 2022-02-25 PROCEDURE — G8482 FLU IMMUNIZE ORDER/ADMIN: HCPCS | Performed by: FAMILY MEDICINE

## 2022-02-25 PROCEDURE — 1036F TOBACCO NON-USER: CPT | Performed by: FAMILY MEDICINE

## 2022-02-25 PROCEDURE — 3017F COLORECTAL CA SCREEN DOC REV: CPT | Performed by: FAMILY MEDICINE

## 2022-02-25 PROCEDURE — G8427 DOCREV CUR MEDS BY ELIG CLIN: HCPCS | Performed by: FAMILY MEDICINE

## 2022-02-25 PROCEDURE — 99213 OFFICE O/P EST LOW 20 MIN: CPT | Performed by: FAMILY MEDICINE

## 2022-02-25 PROCEDURE — G8420 CALC BMI NORM PARAMETERS: HCPCS | Performed by: FAMILY MEDICINE

## 2022-02-25 SDOH — ECONOMIC STABILITY: TRANSPORTATION INSECURITY
IN THE PAST 12 MONTHS, HAS THE LACK OF TRANSPORTATION KEPT YOU FROM MEDICAL APPOINTMENTS OR FROM GETTING MEDICATIONS?: NO

## 2022-02-25 SDOH — HEALTH STABILITY: PHYSICAL HEALTH: ON AVERAGE, HOW MANY MINUTES DO YOU ENGAGE IN EXERCISE AT THIS LEVEL?: 60 MIN

## 2022-02-25 SDOH — ECONOMIC STABILITY: FOOD INSECURITY: WITHIN THE PAST 12 MONTHS, YOU WORRIED THAT YOUR FOOD WOULD RUN OUT BEFORE YOU GOT MONEY TO BUY MORE.: NEVER TRUE

## 2022-02-25 SDOH — ECONOMIC STABILITY: TRANSPORTATION INSECURITY
IN THE PAST 12 MONTHS, HAS LACK OF TRANSPORTATION KEPT YOU FROM MEETINGS, WORK, OR FROM GETTING THINGS NEEDED FOR DAILY LIVING?: NO

## 2022-02-25 SDOH — ECONOMIC STABILITY: FOOD INSECURITY: WITHIN THE PAST 12 MONTHS, THE FOOD YOU BOUGHT JUST DIDN'T LAST AND YOU DIDN'T HAVE MONEY TO GET MORE.: NEVER TRUE

## 2022-02-25 SDOH — HEALTH STABILITY: PHYSICAL HEALTH: ON AVERAGE, HOW MANY DAYS PER WEEK DO YOU ENGAGE IN MODERATE TO STRENUOUS EXERCISE (LIKE A BRISK WALK)?: 3 DAYS

## 2022-02-25 ASSESSMENT — SOCIAL DETERMINANTS OF HEALTH (SDOH)
HOW OFTEN DO YOU ATTENT MEETINGS OF THE CLUB OR ORGANIZATION YOU BELONG TO?: NEVER
ARE YOU MARRIED, WIDOWED, DIVORCED, SEPARATED, NEVER MARRIED, OR LIVING WITH A PARTNER?: NEVER MARRIED
IN A TYPICAL WEEK, HOW MANY TIMES DO YOU TALK ON THE PHONE WITH FAMILY, FRIENDS, OR NEIGHBORS?: MORE THAN THREE TIMES A WEEK
HOW OFTEN DO YOU GET TOGETHER WITH FRIENDS OR RELATIVES?: ONCE A WEEK
HOW OFTEN DO YOU ATTEND CHURCH OR RELIGIOUS SERVICES?: MORE THAN 4 TIMES PER YEAR
HOW HARD IS IT FOR YOU TO PAY FOR THE VERY BASICS LIKE FOOD, HOUSING, MEDICAL CARE, AND HEATING?: NOT HARD AT ALL
DO YOU BELONG TO ANY CLUBS OR ORGANIZATIONS SUCH AS CHURCH GROUPS UNIONS, FRATERNAL OR ATHLETIC GROUPS, OR SCHOOL GROUPS?: NO

## 2022-02-25 ASSESSMENT — LIFESTYLE VARIABLES
HOW OFTEN DO YOU HAVE A DRINK CONTAINING ALCOHOL: NEVER
HOW MANY STANDARD DRINKS CONTAINING ALCOHOL DO YOU HAVE ON A TYPICAL DAY: 1 OR 2

## 2022-02-25 NOTE — PROGRESS NOTES
2/25/2022    Neema Nicolas is a 61 y.o. female here for   Chief Complaint   Patient presents with    Depression     routine visit      Alpha thalassemia   Asthma  Ankle fracture     This injury occurred grant libby  She had been carrying garbage in one hadn a nd a bag of bad potatoes int eh other and the garbage got under her foot and   She had a fall onto her foot  She had another fall last week - tripped. Frustrated because she feels like she is stuck at home and can't do anything  Frustrated and a little bit down  Note also high blood pressure today. She may have been eating more salty foods than usual.  BP Readings from Last 3 Encounters:   02/25/22 (!) 162/82   10/19/21 130/70   07/08/21 138/77         Wt Readings from Last 3 Encounters:   02/25/22 136 lb (61.7 kg)   01/24/22 140 lb (63.5 kg)   10/19/21 140 lb (63.5 kg)       She  reports that she quit smoking about 4 years ago. Her smoking use included cigarettes. She has a 0.50 pack-year smoking history. She has never used smokeless tobacco.    Medications and allergies reviewed and updated in chart. Current Outpatient Medications   Medication Sig Dispense Refill    escitalopram (LEXAPRO) 10 MG tablet TAKE 1 TABLET BY MOUTH EVERY DAY 90 tablet 0    mirtazapine (REMERON) 7.5 MG tablet TAKE 1 TABLET BY MOUTH NIGHTLY FOR INSOMNIA 90 tablet 0    butalbital-aspirin-caffeine (FIORINAL) -40 MG capsule Take 1 capsule by mouth daily as needed for Headaches or Migraine (severe headache) for up to 90 days.  30 capsule 1    levothyroxine (SYNTHROID) 50 MCG tablet Take 1 tablet by mouth Daily 90 tablet 0    fluticasone (FLOVENT HFA) 110 MCG/ACT inhaler TAKE 1 PUFF BY MOUTH TWICE A DAY 1 each 3    albuterol sulfate  (90 Base) MCG/ACT inhaler 1INHALE 2 PUFFS BY MOUTH EVERY 6 HOURS AS NEEDED FOR SHORTNESS OF BREATH  (ok to dispense brand covered by insurance) 1 each 3    Multiple Vitamin (DAILY-RAVEN) TABS TAKE 1 TABLET BY MOUTH EVERY DAY 90 tablet 3    cyanocobalamin 1000 MCG/ML injection Infuse intravenously every 30 days       Rimegepant Sulfate 75 MG TBDP Take 75 mg by mouth daily as needed (migraine) No more than 1 tab in 24 hours; lot [id number] exp 6/2024 (Patient not taking: Reported on 2/25/2022) 8 tablet 0    triamcinolone (KENALOG) 0.025 % cream Apply topically 2 times daily for itching associated with insect bite 15 g 0    vitamin D (CVS D3) 25 MCG (1000 UT) CAPS TAKE 1 CAPSULE DAILY AS DIRECTED 90 capsule 3     No current facility-administered medications for this visit. Facility-Administered Medications Ordered in Other Visits   Medication Dose Route Frequency Provider Last Rate Last Admin    sodium chloride (PF) 0.9 % injection 10 mL  10 mL IntraVENous PRN Harley Allison MD            Patient'spast medical, surgical, social and/or family history reviewed, updated in chart, and are non-contributory (unless otherwise stated). Review of Systems  Review of Systems    PE:  VS:  BP (!) 162/82   Pulse 73   Temp 96.6 °F (35.9 °C)   Ht 5' 4\" (1.626 m)   Wt 136 lb (61.7 kg)   LMP 01/02/2015   SpO2 100%   BMI 23.34 kg/m²   Physical Exam  Constitutional:       Appearance: She is well-developed. HENT:      Head: Normocephalic and atraumatic. Cardiovascular:      Rate and Rhythm: Normal rate and regular rhythm. Heart sounds: No murmur heard. No friction rub. No gallop. Pulmonary:      Effort: Pulmonary effort is normal.      Breath sounds: Normal breath sounds. No wheezing or rales. Skin:     General: Skin is warm and dry. Neurological:      Mental Status: She is alert and oriented to person, place, and time. Psychiatric:      Comments: Dysphoric mood         Assessment/Plan:  Maria Alejandra Hood was seen today for depression.     Diagnoses and all orders for this visit:    Recurrent falls  Doing PT through orthpedic office  Note additional fall  Recommend OT evaluation specificially for fall prevention/ possible DME as needed  - [address], [place], [facility])  Stable, follows with hematology    Elevated BP without diagnosis of hypertension  Continue to monitor - come in for NV   Note change in lfiestyle due to ankle injury - less walking     Care coordination referral      Return in about 2 weeks (around 3/11/2022) for nv for bp check. Advised patient to call with any new medication issues. All questions answered.   Call or go to emergency department ifsymptoms worsen or persist.
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
## 2022-03-04 ENCOUNTER — TELEPHONE (OUTPATIENT)
Dept: CARE COORDINATION | Age: 67
End: 2022-03-04

## 2022-03-04 ENCOUNTER — CARE COORDINATION (OUTPATIENT)
Dept: CARE COORDINATION | Age: 67
End: 2022-03-04

## 2022-03-04 NOTE — TELEPHONE ENCOUNTER
This ACPS received a referral on [place] for AD's and goals of care. ACPS reached out to Dixvictor m today to process referral.  Reganjorden would like AD information mailed to her home. ACPS will request this be mailed out and follow up with another call to review the information and assist as needed with completion.

## 2022-03-04 NOTE — CARE COORDINATION
Ambulatory Care Coordination Note  3/4/2022  CM Risk Score: 5  Charlson 10 Year Mortality Risk Score: 47%     ACC: Arpit Patino, RN    Summary Note:   -ACM spoke with pt for CC f/u, check progress, assess needs  -Pt reports that she is doing well, denies any needs currently  -Reports taking her medications as prescribed  -Reports attending PT on 3/1 & 3/3 and has been doing some of the exercises at home that were given to her at PT. Reports that her left ankle is starting to feel better, plans to walk a little up and down her driveway the next few days with the warmer weather coming Reports she's scheduled again for PT 3/8 & 3/10 and is to see Dr Kallie Rivera on 4/21. Reviewed falls/safety prevention information with pt  -Reminded pt of BP check appt 3/9 @1010, plans to attend.  Encouraged pt to continue trying to avoid salted foods, verbalized understanding and reports that she has been eating a lot of salads   -Plans to purchase BP monitor and cane from her [facility] OTC benefit catalog   -Confirmed pt's healthcare decision maker information, pt reports that she doesn't have a complete packet of information in order to complete her advance directives, ACM offered to place an ACPS referral for assistance, pt agreeable  -Notified pt of handouts being mailed to pt on 3/2 and should be receiving them in the next few days  -Discussed when to call provider for symptoms or changes in condition and what level of care to seek according to symptoms, verbalized understanding    *Plan  -Care coordination  -ACPS referral for completion of advance directives  -Will discuss pt needing to schedule a PCP f/u appt  while at her BP check   -ACM will f/u in about 1 week to check progress and assess needs, pt to contact ACM if any needs arise, has contact information        Care Coordination Interventions    Program Enrollment: Complex Care  Referral from Primary Care Provider: Yes  Suggested Interventions and [facility]: Not Started (Comment: 2/25-discussed)  Fall Risk Prevention: In Process (Comment: 2/25-discussed )  Occupational Therapy: Completed (Comment: 2/25-order placed by PCP)  Pharmacist: Declined (Comment: 2/25-discussed )  Registered Dietician: Not Started (Comment: 2/25-discussed)  Other Services: Not Started (Comment: 2/25-ACPS-discussed)  Transportation Support: Completed         Goals Addressed    None         Prior to Admission medications    Medication Sig Start Date End Date Taking? Authorizing Provider   escitalopram (LEXAPRO) 10 MG tablet TAKE 1 TABLET BY MOUTH EVERY DAY 2/1/22   Castro Serna MD   mirtazapine (REMERON) 7.5 MG tablet TAKE 1 TABLET BY MOUTH NIGHTLY FOR INSOMNIA 2/1/22   Castro Serna MD   Rimegepant Sulfate 75 MG TBDP Take 75 mg by mouth daily as needed (migraine) No more than 1 tab in 24 hours; lot [id number] exp 6/2024  Patient not taking: Reported on 2/25/2022 1/24/22   HERI Iqbal CNP   butalbital-aspirin-caffeine [facility]) -40 MG capsule Take 1 capsule by mouth daily as needed for Headaches or Migraine (severe headache) for up to 90 days.  1/24/22 4/24/22  HERI Iqbal CNP   triamcinolone (KENALOG) 0.025 % cream Apply topically 2 times daily for itching associated with insect bite  Patient not taking: Reported on 2/25/2022 10/22/21   Castro Serna MD   levothyroxine (SYNTHROID) 50 MCG tablet Take 1 tablet by mouth Daily 10/19/21   Castro Serna MD   fluticasone (FLOVENT HFA) 110 MCG/ACT inhaler TAKE 1 PUFF BY MOUTH TWICE A DAY 10/19/21   Castro Serna MD   albuterol sulfate  (90 Base) MCG/ACT inhaler 1INHALE 2 PUFFS BY MOUTH EVERY 6 HOURS AS NEEDED FOR SHORTNESS OF BREATH  (ok to dispense brand covered by insurance) 10/19/21   Castro Serna MD   vitamin D (CVS D3) 25 MCG (1000 UT) CAPS TAKE 1 CAPSULE DAILY AS DIRECTED 10/19/21   Castro Serna MD   Multiple Vitamin (DAILY-RAVEN) TABS TAKE 1 TABLET BY MOUTH EVERY DAY 10/19/21   Bandar Kidd Cyndra Holter, MD   cyanocobalamin 1000 MCG/ML injection Infuse intravenously every 30 days     Historical Provider, MD       Future Appointments   Date Time Provider Dinesh Matamoros   3/9/2022 10:10 AM SCHEDULE, [facility] [place] [facility]      and   General Assessment

## 2022-03-09 ENCOUNTER — NURSE ONLY (OUTPATIENT)
Dept: FAMILY MEDICINE CLINIC | Age: 67
End: 2022-03-09
Payer: MEDICARE

## 2022-03-09 ENCOUNTER — CARE COORDINATION (OUTPATIENT)
Dept: CARE COORDINATION | Age: 67
End: 2022-03-09

## 2022-03-09 ENCOUNTER — TELEPHONE (OUTPATIENT)
Dept: FAMILY MEDICINE CLINIC | Age: 67
End: 2022-03-09

## 2022-03-09 VITALS — HEART RATE: 72 BPM | SYSTOLIC BLOOD PRESSURE: 175 MMHG | DIASTOLIC BLOOD PRESSURE: 86 MMHG

## 2022-03-09 DIAGNOSIS — F32.A DEPRESSION, UNSPECIFIED DEPRESSION TYPE: ICD-10-CM

## 2022-03-09 DIAGNOSIS — J45.30 MILD PERSISTENT ASTHMA WITHOUT COMPLICATION: ICD-10-CM

## 2022-03-09 DIAGNOSIS — F32.89 OTHER DEPRESSION: ICD-10-CM

## 2022-03-09 DIAGNOSIS — G43.009 MIGRAINE WITHOUT AURA AND WITHOUT STATUS MIGRAINOSUS, NOT INTRACTABLE: ICD-10-CM

## 2022-03-09 DIAGNOSIS — G43.109 MIGRAINE WITH AURA AND WITHOUT STATUS MIGRAINOSUS, NOT INTRACTABLE: Chronic | ICD-10-CM

## 2022-03-09 DIAGNOSIS — E03.0 CONGENITAL HYPOTHYROIDISM WITH DIFFUSE GOITER: ICD-10-CM

## 2022-03-09 LAB
ALBUMIN SERPL-MCNC: 4.2 G/DL (ref 3.5–5.2)
ALP BLD-CCNC: 92 U/L (ref 35–104)
ALT SERPL-CCNC: 24 U/L (ref 0–32)
ANION GAP SERPL CALCULATED.3IONS-SCNC: 12 MMOL/L (ref 7–16)
AST SERPL-CCNC: 28 U/L (ref 0–31)
BASOPHILS ABSOLUTE: 0.02 E9/L (ref 0–0.2)
BASOPHILS RELATIVE PERCENT: 0.5 % (ref 0–2)
BILIRUB SERPL-MCNC: 0.4 MG/DL (ref 0–1.2)
BUN BLDV-MCNC: 7 MG/DL (ref 6–23)
CALCIUM SERPL-MCNC: 9.4 MG/DL (ref 8.6–10.2)
CHLORIDE BLD-SCNC: 105 MMOL/L (ref 98–107)
CO2: 27 MMOL/L (ref 22–29)
CREAT SERPL-MCNC: 0.7 MG/DL (ref 0.5–1)
EOSINOPHILS ABSOLUTE: 0.13 E9/L (ref 0.05–0.5)
EOSINOPHILS RELATIVE PERCENT: 3 % (ref 0–6)
GFR AFRICAN AMERICAN: >60
GFR NON-AFRICAN AMERICAN: >60 ML/MIN/1.73
GLUCOSE BLD-MCNC: 82 MG/DL (ref 74–99)
HCT VFR BLD CALC: 39.5 % (ref 34–48)
HEMOGLOBIN: 12.6 G/DL (ref 11.5–15.5)
IMMATURE GRANULOCYTES #: 0.03 E9/L
IMMATURE GRANULOCYTES %: 0.7 % (ref 0–5)
LYMPHOCYTES ABSOLUTE: 1.26 E9/L (ref 1.5–4)
LYMPHOCYTES RELATIVE PERCENT: 29.3 % (ref 20–42)
MCH RBC QN AUTO: 25.3 PG (ref 26–35)
MCHC RBC AUTO-ENTMCNC: 31.9 % (ref 32–34.5)
MCV RBC AUTO: 79.3 FL (ref 80–99.9)
MONOCYTES ABSOLUTE: 0.33 E9/L (ref 0.1–0.95)
MONOCYTES RELATIVE PERCENT: 7.7 % (ref 2–12)
NEUTROPHILS ABSOLUTE: 2.53 E9/L (ref 1.8–7.3)
NEUTROPHILS RELATIVE PERCENT: 58.8 % (ref 43–80)
PDW BLD-RTO: 14.6 FL (ref 11.5–15)
PLATELET # BLD: 304 E9/L (ref 130–450)
PMV BLD AUTO: 10.7 FL (ref 7–12)
POTASSIUM SERPL-SCNC: 3.8 MMOL/L (ref 3.5–5)
RBC # BLD: 4.98 E12/L (ref 3.5–5.5)
SODIUM BLD-SCNC: 144 MMOL/L (ref 132–146)
TOTAL PROTEIN: 8.4 G/DL (ref 6.4–8.3)
WBC # BLD: 4.3 E9/L (ref 4.5–11.5)

## 2022-03-09 PROCEDURE — 36415 COLL VENOUS BLD VENIPUNCTURE: CPT | Performed by: FAMILY MEDICINE

## 2022-03-09 RX ORDER — AMLODIPINE BESYLATE 5 MG/1
5 TABLET ORAL EVERY EVENING
Qty: 30 TABLET | Refills: 1 | Status: SHIPPED
Start: 2022-03-09 | End: 2022-03-31

## 2022-03-09 NOTE — TELEPHONE ENCOUNTER
Recommend amlodipine - start one tablet in the evening  Most people don't have side effects but some constipation and leg swelling are not uncommon.

## 2022-03-09 NOTE — PROGRESS NOTES
Patient here for Blood Pressure Check  Vitals:    03/09/22 1011   BP: (!) 172/85   Pulse: 79       If CHANGES are needed please open this encounter, place orders or make changes as needed and route back to the clinical pool.

## 2022-03-09 NOTE — CARE COORDINATION
Ambulatory Care Coordination Note  3/9/2022  CM Risk Score: 5  Charlson 10 Year Mortality Risk Score: 47%     ACC: Arpit Patino, RN    Summary Note:   -ACM spoke with pt for CC f/u, check progress, assess needs  -Pt reports that she is doing well currently, waiting for her medical transportation to pick her up for her BP check/lab appt at 10:10am. Reminded pt to make a f/u appt with Dr Esa Warner while at said visit, verbalized understanding. Denies any needs at this time  -Inquired about status of her L ankle, reports that it continues to improve. Reports that she was scheduled for PT yesterday but her medical transportation had the time mixed up and tried to pick pt up 10 min prior to her appt time, reports that she cancelled PT for yesterday and is scheduled for 3/10  -Reports taking medications as prescribed  -Inquired about if she has received the handouts that were mailed to her, reports that she hasn't yet, ACM encouraged pt to notify ACM if she doesn't receive them and ACM will have them resent, verbalized understanding  -Discussed when to call provider for symptoms or changes in condition and what level of care to seek according to symptoms, verbalized understanding.     *Plan  -Care coordination  -Pt to schedule PCP appt while at her BP check/lab appt today  -Pt to attend PT 3/10  -ACM informed pt that ACM will be away after Thursday and will f/u in about 2 weeks to check progress, assess needs, check is pt has purchased BP monitor,  encouraged pt to contact the PCP's office if any issues arise while ACM is away, verbalized understanding    Care Coordination Interventions    Program Enrollment: Complex Care  Referral from Primary Care Provider: Yes  Suggested Interventions and [facility]: Not Started (Comment: 2/25-discussed)  Fall Risk Prevention: Completed (Comment: 2/25-discussed )  Occupational Therapy: Completed (Comment: 2/25-order placed by PCP)  Pharmacist: Dada Prado (Comment: 2/25-discussed )  Registered Dietician: Not Started (Comment: 2/25-discussed)  Other Services: Completed (Comment: 3/4-discussed again;2/25-ACPS-discussed)  Transportation Support: Completed         Goals Addressed                 This Visit's Progress     Conditions and Symptoms   On track     I will schedule office visits, as directed by my provider. I will keep my appointment or reschedule if I have to cancel. I will notify my provider of any barriers to my plan of care. I will follow my Zone Management tool to seek urgent or emergent care. I will notify my provider of any symptoms that indicate a worsening of my condition. Barriers: overwhelmed by complexity of regimen, stress, and lack of education  Plan for overcoming my barriers: ACM will educate pt on walk-in care sites and where to go according to symptoms  Confidence: 9/10  Anticipated Goal Completion Date: 5/27/2022                Prior to Admission medications    Medication Sig Start Date End Date Taking? Authorizing Provider   escitalopram (LEXAPRO) 10 MG tablet TAKE 1 TABLET BY MOUTH EVERY DAY 2/1/22   Shabana Jaramillo MD   mirtazapine (REMERON) 7.5 MG tablet TAKE 1 TABLET BY MOUTH NIGHTLY FOR INSOMNIA 2/1/22   Shabana Jaramillo MD   Rimegepant Sulfate 75 MG TBDP Take 75 mg by mouth daily as needed (migraine) No more than 1 tab in 24 hours; lot [id number] exp 6/2024  Patient not taking: Reported on 2/25/2022 1/24/22   HERI Blanchard CNP   butalbital-aspirin-caffeine [facility]) -40 MG capsule Take 1 capsule by mouth daily as needed for Headaches or Migraine (severe headache) for up to 90 days.  1/24/22 4/24/22  HERI Blanchard CNP   triamcinolone (KENALOG) 0.025 % cream Apply topically 2 times daily for itching associated with insect bite  Patient not taking: Reported on 2/25/2022 10/22/21   Shabana Jaramillo MD   levothyroxine (SYNTHROID) 50 MCG tablet Take 1 tablet by mouth Daily 10/19/21   Shabana Jaramillo MD fluticasone (FLOVENT HFA) 110 MCG/ACT inhaler TAKE 1 PUFF BY MOUTH TWICE A DAY 10/19/21   Mary Thomas MD   albuterol sulfate  (90 Base) MCG/ACT inhaler 1INHALE 2 PUFFS BY MOUTH EVERY 6 HOURS AS NEEDED FOR SHORTNESS OF BREATH  (ok to dispense brand covered by insurance) 10/19/21   Mary Thomas MD   vitamin D (CVS D3) 25 MCG (1000 UT) CAPS TAKE 1 CAPSULE DAILY AS DIRECTED 10/19/21   Mary Thomas MD   Multiple Vitamin (DAILY-RAVEN) TABS TAKE 1 TABLET BY MOUTH EVERY DAY 10/19/21   Mary Thomas MD   cyanocobalamin 1000 MCG/ML injection Infuse intravenously every 30 days     Historical Provider, MD       Future Appointments   Date Time Provider Dinesh Matamoros   3/9/2022 10:10 AM SCHEDULE, [facility] [place] [facility]      and   General Assessment    Do you have any symptoms that are causing concern?: No

## 2022-03-09 NOTE — TELEPHONE ENCOUNTER
Please call patient  Her blood pressure at the nurse visit today was still quite high  I would like to start a blood pressure medication  Let me know if she is willing and I will send in to the pharmacy. If she would like to come in to the office to discuss further before starting a medication we can schedule her sooner than April. Let me know. Thanks.

## 2022-03-15 DIAGNOSIS — J45.30 MILD PERSISTENT ASTHMA WITHOUT COMPLICATION: ICD-10-CM

## 2022-03-15 RX ORDER — FLUTICASONE PROPIONATE 110 UG/1
AEROSOL, METERED RESPIRATORY (INHALATION)
Qty: 12 EACH | Refills: 3 | Status: SHIPPED
Start: 2022-03-15 | End: 2022-10-18

## 2022-03-16 DIAGNOSIS — E03.0 CONGENITAL HYPOTHYROIDISM WITH DIFFUSE GOITER: ICD-10-CM

## 2022-03-17 RX ORDER — LEVOTHYROXINE SODIUM 0.05 MG/1
TABLET ORAL
Qty: 90 TABLET | Refills: 0 | Status: SHIPPED
Start: 2022-03-17 | End: 2022-06-15

## 2022-03-22 ENCOUNTER — TELEPHONE (OUTPATIENT)
Dept: CARE COORDINATION | Age: 67
End: 2022-03-22

## 2022-03-22 NOTE — TELEPHONE ENCOUNTER
This ACPS attempted to contact Jie Falcon today for review of the mailed AD information that Jie Falcon requested. There was no answer and there was no VM for a message to be left.       ACPS will attempt another outreach call later in the week

## 2022-03-23 ENCOUNTER — CARE COORDINATION (OUTPATIENT)
Dept: CARE COORDINATION | Age: 67
End: 2022-03-23

## 2022-03-23 NOTE — CARE COORDINATION
Ambulatory Care Coordination Note  3/23/2022  CM Risk Score: 5  Charlson 10 Year Mortality Risk Score: 47%     ACC: Arpit Patino RN    Summary Note:   -ACM spoke with pt for CC f/u, check progress, assess needs  -Pt reports that she is doing well currently, denies any issues or needs at this time  -Reports that she completed her final PT session today and is scheduled to see the orthopedic provider on 4/21. Reports that the swelling in her left ankle has decreased and that the pain has improved somewhat. Reports that she has been doing the exercises at home that were given to her at PT  -Pt reports that she plans to contact [facility] OT dept tomorrow to inquire about her referral, declined assistance and reports that she will contact AC if needed  -Reviewed medications and noted that pt was prescribed Norvasc d/t her BP. Pt reports that she has been taking it as prescribed, reports that she tried to order the BP monitor from her OTC benefit catalog but they are currently out of stock, reports that her sister is going to the local pharmacy to see of they have any for pt to purchase.  Pt reports that once she obtains the BP monitor she will check her BP and record  -Reports that the nurse from [facility] will see pt in her home on 3/29 between noon-5pm    *Plan  -Care coordination   -Pt to purchase a BP monitor and begin recording her readings  -Pt to contact AC if any issues with the OT referral arise once she calls tomorrow  -ACM will f/u in about 1 week to check progress and assess needs      Care Coordination Interventions    Program Enrollment: Complex Care  Referral from Primary Care Provider: Yes  Suggested Interventions and [address] [place] [address]: Not Started (Comment: 2/25-discussed)  Fall Risk Prevention: Completed (Comment: 2/25-discussed )  Occupational Therapy: Completed (Comment: 2/25-order placed by PCP)  Pharmacist: Declined (Comment: 2/25-discussed )  Registered Dietician: Not Started (Comment: 2/25-discussed)  Other Services: Completed (Comment: 3/4-discussed again;2/25-ACPS-discussed)  Transportation Support: Completed         Goals Addressed                 This Visit's Progress     Conditions and Symptoms   On track     I will schedule office visits, as directed by my provider. I will keep my appointment or reschedule if I have to cancel. I will notify my provider of any barriers to my plan of care. I will follow my Zone Management tool to seek urgent or emergent care. I will notify my provider of any symptoms that indicate a worsening of my condition. Barriers: overwhelmed by complexity of regimen, stress, and lack of education  Plan for overcoming my barriers: ACM will educate pt on walk-in care sites and where to go according to symptoms  Confidence: 9/10  Anticipated Goal Completion Date: 5/27/2022                Prior to Admission medications    Medication Sig Start Date End Date Taking?  Authorizing Provider   levothyroxine (SYNTHROID) 50 MCG tablet TAKE 1 TABLET BY MOUTH EVERY DAY 3/17/22  Yes Nikki Rob MD   fluticasone (FLOVENT HFA) 110 MCG/ACT inhaler INHALE 1 PUFF BY MOUTH TWICE A DAY 3/15/22  Yes Nikki Rob MD   amLODIPine (NORVASC) 5 MG tablet Take 1 tablet by mouth every evening 3/9/22  Yes Nikki Rob MD   escitalopram (LEXAPRO) 10 MG tablet TAKE 1 TABLET BY MOUTH EVERY DAY 2/1/22  Yes Nikki Rob MD   mirtazapine (REMERON) 7.5 MG tablet TAKE 1 TABLET BY MOUTH NIGHTLY FOR INSOMNIA 2/1/22  Yes Nikki Rob MD   albuterol sulfate  (90 Base) MCG/ACT inhaler 1INHALE 2 PUFFS BY MOUTH EVERY 6 HOURS AS NEEDED FOR SHORTNESS OF BREATH  (ok to dispense brand covered by insurance) 10/19/21  Yes iNkki Rob MD   vitamin D ([employer] D3) 25 MCG (1000 UT) CAPS TAKE 1 CAPSULE DAILY AS DIRECTED 10/19/21  Yes Nikki Rob MD   Multiple Vitamin (DAILY-RAVEN) TABS TAKE 1 TABLET BY MOUTH EVERY DAY 10/19/21  Yes Nikki Rob MD   cyanocobalamin 1000 MCG/ML injection Infuse intravenously every 30 days    Yes Historical Provider, MD   Rimegepant Sulfate 75 MG TBDP Take 75 mg by mouth daily as needed (migraine) No more than 1 tab in 24 hours; lot [id number] exp 6/2024  Patient not taking: Reported on 2/25/2022 1/24/22   HERI Dougherty CNP   butalbital-aspirin-caffeine [facility]) -40 MG capsule Take 1 capsule by mouth daily as needed for Headaches or Migraine (severe headache) for up to 90 days.   Patient not taking: Reported on 3/23/2022 1/24/22 4/24/22  HERI Dougherty CNP   triamcinolone (KENALOG) 0.025 % cream Apply topically 2 times daily for itching associated with insect bite  Patient not taking: Reported on 2/25/2022 10/22/21   Rolanda Morales MD       Future Appointments   Date Time Provider Dinesh Matamoros   4/11/2022 10:30 AM HAYLEY MELTON RM 2 HAYLEY ALEX [facility]   4/28/2022 11:00 AM [facility]      and   General Assessment    Do you have any symptoms that are causing concern?: No

## 2022-03-23 NOTE — TELEPHONE ENCOUNTER
Please see note below from 3/9 and verify that patient has received the medication and the information below. Thanks.

## 2022-03-24 ENCOUNTER — TELEPHONE (OUTPATIENT)
Dept: CARE COORDINATION | Age: 67
End: 2022-03-24

## 2022-03-24 NOTE — TELEPHONE ENCOUNTER
This ACPS attempted to contact Edison Del Rosario again to f/u on mailed AD information. The home number rang and then was hung up and all attempts after that were \"busy\". There was no answer for the mobile number but ACPS did leave a VM and call back number. ACPS will make another outreach attempt next week. ACPS will consider closing referral if there is still no contact with Edison Carin.

## 2022-03-29 ENCOUNTER — TELEPHONE (OUTPATIENT)
Dept: FAMILY MEDICINE CLINIC | Age: 67
End: 2022-03-29

## 2022-03-29 NOTE — TELEPHONE ENCOUNTER
Excellent     Her blood pressure is at goal on amlodipine  Weight is improving    Wt Readings from Last 3 Encounters:   02/25/22 136 lb (61.7 kg)   01/24/22 140 lb (63.5 kg)   10/19/21 140 lb (63.5 kg)

## 2022-03-29 NOTE — TELEPHONE ENCOUNTER
Pt calling to let Dr Will Askew know that her [facility] nurse- Ratna Nicholson, just left her house and told her to call us and report her vitals- pulse 70, /78, temp 97, and she weighed in at 140lb.

## 2022-03-30 ENCOUNTER — CARE COORDINATION (OUTPATIENT)
Dept: CARE COORDINATION | Age: 67
End: 2022-03-30

## 2022-03-30 ENCOUNTER — TELEPHONE (OUTPATIENT)
Dept: CARE COORDINATION | Age: 67
End: 2022-03-30

## 2022-03-30 NOTE — TELEPHONE ENCOUNTER
This ACPS was able to reach Eloymi today. She states that she never received anything with AD information. ACPS did review that the information will be sent out again.

## 2022-03-30 NOTE — CARE COORDINATION
Ambulatory Care Coordination Note  3/30/2022  CM Risk Score: 5  Charlson 10 Year Mortality Risk Score: 47%     ACC: Arpit Patino, RN    Summary Note:   -Spoke with pt for CC f/u, check progress, and assess needs  -Pt reports that she is doing well currently, reports that her left ankle pain continues to improve, reports that she is still doing the PT exercises at home and has been walking up and down her walk way occasionally and reports a small amount of tenderness. Reports using inserts in her shoes and they have been helping. She will she the orthopedic dr on 4/21  -Pt will have OT eval done 3/31 at 0800  -Reports that she is taking her medications as prescribed  -ACM noted pt's BP when the [facility] RN saw pt last week of 128/78  -Pt denies any needs at this time  -Discussed when to call provider for symptoms or changes in condition and what level of care to seek according to symptoms, verbalized understanding    *Appts  -Mammogram 4/11 @1030  -PCP appt 4/28 @11am    *Plan  -Care coordination  -Pt to attend upcoming appts as scheduled  -ACM will f/u in about 2 weeks to check progress and assess needs, pt to contact ACM if any needs arise prior to outreach, has contact information     Care Coordination Interventions    Program Enrollment: Complex Care  Referral from Primary Care Provider: Yes  Suggested Interventions and Community Resources  [address]: Not Started (Comment: 2/25-discussed)  Fall Risk Prevention: Completed (Comment: 2/25-discussed )  Occupational Therapy: Completed (Comment: 2/25-order placed by PCP)  Pharmacist: Declined (Comment: 2/25-discussed )  Registered Dietician: Not Started (Comment: 2/25-discussed)  Other Services: Completed (Comment: 3/4-discussed again;2/25-ACPS-discussed)  Transportation Support: Completed         Goals Addressed                 This Visit's Progress     Conditions and Symptoms   On track     I will schedule office visits, as directed by my provider.   I will keep my appointment or reschedule if I have to cancel. I will notify my provider of any barriers to my plan of care. I will follow my Zone Management tool to seek urgent or emergent care. I will notify my provider of any symptoms that indicate a worsening of my condition. Barriers: overwhelmed by complexity of regimen, stress, and lack of education  Plan for overcoming my barriers: ACM will educate pt on walk-in care sites and where to go according to symptoms  Confidence: 9/10  Anticipated Goal Completion Date: 5/27/2022                Prior to Admission medications    Medication Sig Start Date End Date Taking? Authorizing Provider   levothyroxine (SYNTHROID) 50 MCG tablet TAKE 1 TABLET BY MOUTH EVERY DAY 3/17/22   Timo Chavez MD   fluticasone (FLOVENT HFA) 110 MCG/ACT inhaler INHALE 1 PUFF BY MOUTH TWICE A DAY 3/15/22   Timo Chavez MD   amLODIPine (NORVASC) 5 MG tablet Take 1 tablet by mouth every evening 3/9/22   Timo Chavez MD   escitalopram (LEXAPRO) 10 MG tablet TAKE 1 TABLET BY MOUTH EVERY DAY 2/1/22   Timo Chavez MD   mirtazapine (REMERON) 7.5 MG tablet TAKE 1 TABLET BY MOUTH NIGHTLY FOR INSOMNIA 2/1/22   Timo Chavez MD   Rimegepant Sulfate 75 MG TBDP Take 75 mg by mouth daily as needed (migraine) No more than 1 tab in 24 hours; lot [id number] exp 6/2024  Patient not taking: Reported on 2/25/2022 1/24/22   HERI Wolfe CNP   butalbital-aspirin-caffeine [facility]) -40 MG capsule Take 1 capsule by mouth daily as needed for Headaches or Migraine (severe headache) for up to 90 days.   Patient not taking: Reported on 3/23/2022 1/24/22 4/24/22  HERI Wolfe CNP   triamcinolone (KENALOG) 0.025 % cream Apply topically 2 times daily for itching associated with insect bite  Patient not taking: Reported on 2/25/2022 10/22/21   Timo Chavez MD   albuterol sulfate  (90 Base) MCG/ACT inhaler 1INHALE 2 PUFFS BY MOUTH EVERY 6 HOURS AS NEEDED FOR SHORTNESS OF BREATH  (ok to dispense brand covered by insurance) 10/19/21   Bradford Wyatt MD   vitamin D (CVS D3) 25 MCG (1000 UT) CAPS TAKE 1 CAPSULE DAILY AS DIRECTED 10/19/21   Bradford Wyatt MD   Multiple Vitamin (DAILY-RAVEN) TABS TAKE 1 TABLET BY MOUTH EVERY DAY 10/19/21   Bradford Wyatt MD   cyanocobalamin 1000 MCG/ML injection Infuse intravenously every 30 days     Historical Provider, MD       Future Appointments   Date Time Provider Dinesh Matamoros   3/31/2022  8:00 AM [facility]   4/11/2022 10:30 AM SEYZ ABDPATTI GERONIMO RM 2 SEYZ [facility]BDPAT[facility]   4/28/2022 11:00 AM [facility]      and   General Assessment    Do you have any symptoms that are causing concern?: No

## 2022-03-31 ENCOUNTER — TELEPHONE (OUTPATIENT)
Dept: FAMILY MEDICINE CLINIC | Age: 67
End: 2022-03-31

## 2022-03-31 ENCOUNTER — HOSPITAL ENCOUNTER (OUTPATIENT)
Dept: PHYSICAL THERAPY | Age: 67
Setting detail: THERAPIES SERIES
Discharge: HOME | End: 2022-03-31
Payer: MEDICARE

## 2022-03-31 PROCEDURE — 97161 PT EVAL LOW COMPLEX 20 MIN: CPT | Performed by: PHYSICAL THERAPIST

## 2022-03-31 RX ORDER — AMLODIPINE BESYLATE 5 MG/1
TABLET ORAL
Qty: 30 TABLET | Refills: 1 | Status: SHIPPED
Start: 2022-03-31 | End: 2022-04-27

## 2022-03-31 ASSESSMENT — PAIN SCALES - GENERAL: PAINLEVEL_OUTOF10: 6

## 2022-03-31 ASSESSMENT — PAIN DESCRIPTION - PAIN TYPE: TYPE: ACUTE PAIN

## 2022-03-31 ASSESSMENT — PAIN DESCRIPTION - ORIENTATION: ORIENTATION: LEFT

## 2022-03-31 ASSESSMENT — PAIN - FUNCTIONAL ASSESSMENT: PAIN_FUNCTIONAL_ASSESSMENT: PREVENTS OR INTERFERES WITH MANY ACTIVE NOT PASSIVE ACTIVITIES

## 2022-03-31 ASSESSMENT — PAIN DESCRIPTION - DESCRIPTORS: DESCRIPTORS: ACHING

## 2022-03-31 ASSESSMENT — PAIN DESCRIPTION - LOCATION: LOCATION: ANKLE

## 2022-03-31 NOTE — PROGRESS NOTES
Physical Therapy  Initial Assessment  Date: 3/31/2022  Patient Name: Emilie Rodriguez  MRN: [id number]  : 1955        Subjective   General  Chart Reviewed: Yes  Referring Practitioner: Kaleb Booker.   Referral Date : 22  Diagnosis: recurrent falls  PT Visit Information  Onset Date: 22  PT Insurance Information: [facility] Medicare                      cert dates:    to  22                     ICD-10:  R29.6  Total # of Visits Approved: 6-8  Total # of Visits to Date: 1  Subjective  Subjective: pt presents to therapy with c/o ankle pain and decreased balance since falling 21; tells PT she slipped down some steps injuring her ankle, x-ray done + for fx of tip of lateral malleolous with minimal to no displacement; states she had 4 weeks of PT at another facility for the ankle; reports falling ~ 2 weeks ago aggravating the symptoms; no MEDS or PMH for L LE; sleep is hampered; RTD for follow-up 22  Pain Screening  Patient Currently in Pain: Yes  Pain Assessment  Pain Assessment: 0-10  Pain Level: 6  Pain Type: Acute pain  Pain Location: Ankle  Pain Orientation: Left  Pain Descriptors: Aching  Functional Pain Assessment: Prevents or interferes with many active not passive activities  Vital Signs  Patient Currently in Pain: Yes    Objective     AROM RLE (degrees)  RLE AROM: WNL  AROM LLE (degrees)  LLE AROM : WNL  AROM RUE (degrees)  RUE AROM : WNL  AROM LUE (degrees)  LUE AROM : WNL    Strength Other  Other: grossly 5/5 for all ranges L hip/knee, 3+/5 across L ankle for all planes     Additional Measures  Special Tests: L ankle:  A/P and M/L drawers -/-; mortise is clear  Other: endurance for all prolonged activiites is FAIR+/GOOD-  Sensation  Overall Sensation Status: WNL     Ambulation  Ambulation?: Yes  Ambulation 1  Surface: level tile  Device: No Device  Assistance: Independent  Quality of Gait: slow/guarded mena with decreased heel-toe progression and stance L LE due to ankle

## 2022-03-31 NOTE — PROGRESS NOTES
[address] [facility]                Phone: [phone]   Fax: [phone]    Physical Therapy Daily Treatment Note  Date:  3/31/2022    Patient Name:  Dereck Yung    :    MRN: [id number]    Evaluating therapist:  LUCRETIA Teague                    (3/31/22)  Restrictions/Precautions:    Diagnosis:  recurrent falls  Treatment Diagnosis:    Insurance/Certification information:  [facility] Medicare                      cert dates:    to  22                     ICD-10:  R29.6  Referring Physician:  Conception Antes of care signed (Y/N):  Y  Visit# / total visits:  -  Pain level: 6/10   Time In:  Time Out:    Subjective:      Exercises:  Exercise/Equipment Resistance/Repetitions Other comments   StepOne              Total Gym squats            toe raises     step ups             side     standing TKE with SC              side/side amb     tandem amb     foam marching            rocker board:  A/P                            M/L                                   Other Therapeutic Activities:      Home Exercise Program:  provided 3/31/22     Manual Treatments:      Modalities:  IFC/MH to L ankle PRN     Timed Code Treatment Minutes: Total Treatment Minutes:      Treatment/Activity Tolerance:  [] Patient tolerated treatment well [] Patient limited by fatique  [] Patient limited by pain  [] Patient limited by other medical complications  [] Other:     Prognosis: [] Good [] Fair  [] Poor    Patient Requires Follow-up: [] Yes  [] No    Plan:   [] Continue per plan of care [] Alter current plan (see comments)  [] Plan of care initiated [] Hold pending MD visit [] Discharge  Plan for Next Session:      See Weekly Progress Note: []  Yes  []  No  Next due:        Electronically signed by:   Roberto Carlos Stringer PT

## 2022-03-31 NOTE — TELEPHONE ENCOUNTER
She is doing PT through orthopedics   I would like her to be assessed by OT to see if she would benefit from any assistive devices, etc to reduce her fall risk

## 2022-04-05 ENCOUNTER — HOSPITAL ENCOUNTER (OUTPATIENT)
Dept: PHYSICAL THERAPY | Age: 67
Setting detail: THERAPIES SERIES
Discharge: HOME | End: 2022-04-05
Payer: MEDICARE

## 2022-04-05 NOTE — PROGRESS NOTES
[address] [facility]                Phone: [phone]  Fax: [phone]    Physical Therapy  Cancellation/No-show Note  Patient Name:  Ra Staley  :  1955   Date:  2022    For today's appointment patient:  []  Cancelled  []  Rescheduled appointment  [x]  No-show     Reason given by patient:  []  Patient ill  []  Conflicting appointment  []  No transportation    []  Conflict with work  []  No reason given  []  Other:     Comments:      Electronically signed by:  Elly Restrepo ATC, PTA

## 2022-04-08 ENCOUNTER — TELEPHONE (OUTPATIENT)
Dept: CARE COORDINATION | Age: 67
End: 2022-04-08

## 2022-04-08 NOTE — TELEPHONE ENCOUNTER
ACPS called and spoke to Wily today. She states that she did receive the AD information in the mail. Wily would like some time to review the information before working with ACPS to complete it. Date and time are set for 4/20/22 @10am for completion of AD's.

## 2022-04-11 ENCOUNTER — HOSPITAL ENCOUNTER (OUTPATIENT)
Dept: GENERAL RADIOLOGY | Age: 67
Discharge: HOME | End: 2022-04-13
Payer: MEDICARE

## 2022-04-11 DIAGNOSIS — Z12.31 ENCOUNTER FOR SCREENING MAMMOGRAM FOR BREAST CANCER: ICD-10-CM

## 2022-04-11 PROCEDURE — 77063 BREAST TOMOSYNTHESIS BI: CPT

## 2022-04-12 ENCOUNTER — HOSPITAL ENCOUNTER (OUTPATIENT)
Dept: OCCUPATIONAL THERAPY | Age: 67
Setting detail: THERAPIES SERIES
Discharge: HOME | End: 2022-04-12
Payer: MEDICARE

## 2022-04-12 PROCEDURE — 97165 OT EVAL LOW COMPLEX 30 MIN: CPT | Performed by: OCCUPATIONAL THERAPIST

## 2022-04-12 PROCEDURE — 97530 THERAPEUTIC ACTIVITIES: CPT | Performed by: OCCUPATIONAL THERAPIST
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
## 2022-04-13 ENCOUNTER — CARE COORDINATION (OUTPATIENT)
Dept: CARE COORDINATION | Age: 67
End: 2022-04-13

## 2022-04-13 NOTE — CARE COORDINATION
Ambulatory Care Coordination Note  4/13/2022  CM Risk Score: 5  Charlson 10 Year Mortality Risk Score: 47%     ACC: Arpit Patino, RN    Summary Note:   -Spoke with pt for CC f/u, check progress, assess needs  -Pt reports doing good, L ankle pain continues to improve, reports minimal tenderness on occasion  -ACM noted pt having OT, 4/19 & 4/22  -Taking medications as prescribed  -Reports that she ordered a BP monitor from her [facility] OTC benefit catalog, she is waiting for it to be delivered.  ACM encouraged her to check her BP before taking her BP medication and then 1 hour after taking BP medication and record, verbalized understanding  -Denies any needs or issues currently    *Asthma  -Reports stable condition, denies any SOB, coughing, or sputum production at this time  -Using Flovent HFA BID and Albuterol rescue inhaler PRN    *Appts  -4/19: OT @0723  -4/21:ortho appt  -4/22: OT @7310  -[facility]  -Pt aware of all appts and plans to attend and has transportation    *Plan  -Care coordination  -PT to begin checking and recording BP readings once her BP monitor has been delivered  -ACM will f/u in about 2 weeks to check progress and assess needs, pt to contact ACM if any needs arise, has contact information      Care Coordination Interventions    Program Enrollment: Complex Care  Referral from Primary Care Provider: Yes  Suggested Interventions and [address] [place] [address] [place]: Not Started (Comment: 2/25-discussed)  Fall Risk Prevention: Completed (Comment: 2/25-discussed )  Occupational Therapy: Completed (Comment: 2/25-order placed by PCP)  Pharmacist: Declined (Comment: 2/25-discussed )  Registered Dietician: Not Started (Comment: 2/25-discussed)  Other Services: Completed (Comment: 3/4-discussed again;2/25-ACPS-discussed)  Transportation Support: Completed         Goals Addressed                 This Visit's Progress     Conditions and Symptoms   On track     I will schedule office visits, as directed by my provider. I will keep my appointment or reschedule if I have to cancel. I will notify my provider of any barriers to my plan of care. I will follow my Zone Management tool to seek urgent or emergent care. I will notify my provider of any symptoms that indicate a worsening of my condition. Barriers: overwhelmed by complexity of regimen, stress, and lack of education  Plan for overcoming my barriers: ACM will educate pt on walk-in care sites and where to go according to symptoms  Confidence: 9/10  Anticipated Goal Completion Date: 5/27/2022                Prior to Admission medications    Medication Sig Start Date End Date Taking?  Authorizing Provider   amLODIPine (NORVASC) 5 MG tablet TAKE 1 TABLET BY MOUTH EVERY DAY IN THE EVENING 3/31/22  Yes Zack Del Cid MD   levothyroxine (SYNTHROID) 50 MCG tablet TAKE 1 TABLET BY MOUTH EVERY DAY 3/17/22  Yes Zack Del Cid MD   fluticasone (FLOVENT HFA) 110 MCG/ACT inhaler INHALE 1 PUFF BY MOUTH TWICE A DAY 3/15/22  Yes Zack Del Cid MD   escitalopram (LEXAPRO) 10 MG tablet TAKE 1 TABLET BY MOUTH EVERY DAY 2/1/22  Yes Zack Del Cid MD   mirtazapine (REMERON) 7.5 MG tablet TAKE 1 TABLET BY MOUTH NIGHTLY FOR INSOMNIA 2/1/22  Yes Zack Del Cid MD   Rimegepant Sulfate 75 MG TBDP Take 75 mg by mouth daily as needed (migraine) No more than 1 tab in 24 hours; lot [id number] exp 6/2024 1/24/22  Yes HERI Presley - CNP   albuterol sulfate  (90 Base) MCG/ACT inhaler 1INHALE 2 PUFFS BY MOUTH EVERY 6 HOURS AS NEEDED FOR SHORTNESS OF BREATH  (ok to dispense brand covered by insurance) 10/19/21  Yes Zack Del Cid MD   vitamin D ([facility] D3) 25 MCG (1000 UT) CAPS TAKE 1 CAPSULE DAILY AS DIRECTED 10/19/21  Yes Zakc Del Cid MD   Multiple Vitamin (DAILY-RAVEN) TABS TAKE 1 TABLET BY MOUTH EVERY DAY 10/19/21  Yes Zack Del Cid MD   cyanocobalamin 1000 MCG/ML injection Infuse intravenously every 30 days    Yes Historical Provider, MD butalbital-aspirin-caffeine (FIORINAL) -40 MG capsule Take 1 capsule by mouth daily as needed for Headaches or Migraine (severe headache) for up to 90 days.   Patient not taking: Reported on 3/23/2022 1/24/22 4/24/22  HERI Ferrara - CNP   triamcinolone (KENALOG) 0.025 % cream Apply topically 2 times daily for itching associated with insect bite  Patient not taking: Reported on 2/25/2022 10/22/21   Cristal Llamas MD       Future Appointments   Date Time Provider Dinesh Robini   4/19/2022  8:30 AM Aurea Figueroa, [facility]   4/22/2022  9:00 AM Aurea Figueroa, [address] [place] [address]   4/28/2022 11:00 AM Cristal Llamas MD [facility]      and   General Assessment    Do you have any symptoms that are causing concern?: No

## 2022-04-19 ENCOUNTER — HOSPITAL ENCOUNTER (OUTPATIENT)
Dept: OCCUPATIONAL THERAPY | Age: 67
Setting detail: THERAPIES SERIES
Discharge: HOME | End: 2022-04-19
Payer: MEDICARE

## 2022-04-19 PROCEDURE — 97110 THERAPEUTIC EXERCISES: CPT

## 2022-04-19 PROCEDURE — 97530 THERAPEUTIC ACTIVITIES: CPT
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
## 2022-04-20 ENCOUNTER — TELEPHONE (OUTPATIENT)
Dept: CARE COORDINATION | Age: 67
End: 2022-04-20

## 2022-04-20 NOTE — TELEPHONE ENCOUNTER
This [facility] spoke with Ailyn Paredes this morning for our scheduled AD completion. Derickodalisbarby Lena states she is not able to work with [facility] at this time; states that her sister lives next door and understands the AD's and can assist with completing them; states that her sister also knows a notary. Ailyn Paredes requested to look into this option before having [facility]PS assist her.   ACPS agreeable and will make a final f/u call in 1-2 weeks to check status before closing the referral.

## 2022-04-24 DIAGNOSIS — G47.00 INSOMNIA, UNSPECIFIED TYPE: ICD-10-CM

## 2022-04-26 ENCOUNTER — CARE COORDINATION (OUTPATIENT)
Dept: CARE COORDINATION | Age: 67
End: 2022-04-26

## 2022-04-26 ENCOUNTER — HOSPITAL ENCOUNTER (OUTPATIENT)
Dept: OCCUPATIONAL THERAPY | Age: 67
Setting detail: THERAPIES SERIES
Discharge: HOME | End: 2022-04-26
Payer: MEDICARE

## 2022-04-26 PROCEDURE — 97530 THERAPEUTIC ACTIVITIES: CPT

## 2022-04-26 PROCEDURE — 97112 NEUROMUSCULAR REEDUCATION: CPT

## 2022-04-26 PROCEDURE — 97110 THERAPEUTIC EXERCISES: CPT

## 2022-04-26 RX ORDER — MIRTAZAPINE 7.5 MG/1
TABLET, FILM COATED ORAL
Qty: 90 TABLET | Refills: 0 | Status: SHIPPED
Start: 2022-04-26 | End: 2022-07-19
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
## 2022-04-26 NOTE — CARE COORDINATION
Ambulatory Care Coordination Note  4/26/2022  CM Risk Score: 5  Charlson 10 Year Mortality Risk Score: 47%     ACC: Arpit Patino RN    Summary Note:   *CC f/u  -Pt reports doing good currently  -Continues to attend OT at [facility] ([facility]), next appt 4/27 @1130am  -Reports left ankle being stable at this time, reports some tenderness at times but reports improvement   -Reports taking her medications as prescribed  -Reports that she is still waiting for her BP monitor from [facility] catalog to be delivered    *Asthma  -Reports stable condition  -Denies any asthma issues or concerns at this time  -Using Flovent inhaler BID as prescribed    *Appts  -PCP 4/28 @11am  -OT 4/27 @1130am  -Pt plans to attend appts    *Plan  -Care Coordination  -Pt to attend appts  -ACM will f/u in about 2 weeks to check progress, assess needs, and consider graduating pt from care coordination, pt to contact pt if any needs arise, has contact information                  *Plan  -Care coordination   -Pt to attend PCP appt on 4/28 at 11am  -Pt to attend OT 4/27 @1130am  -ACM will f/u in about 2 weeks to check progress and assess needs, pt to contact ACM if any needs arise, has contact information          Care Coordination Interventions    Program Enrollment: Complex Care  Referral from Primary Care Provider: Yes  Suggested Interventions and Community Resources  [facility]: Not Started (Comment: 2/25-discussed)  Fall Risk Prevention: Completed (Comment: 2/25-discussed )  Occupational Therapy: Completed (Comment: 2/25-order placed by PCP)  Pharmacist: Declined (Comment: 2/25-discussed )  Registered Dietician: Not Started (Comment: 2/25-discussed)  Other Services: Completed (Comment: 3/4-discussed again;2/25-ACPS-discussed)  Transportation Support: Completed         Goals Addressed    None         Prior to Admission medications    Medication Sig Start Date End Date Taking?  Authorizing Provider   mirtazapine (REMERON) 7.5 MG tablet TAKE 1 TABLET BY MOUTH NIGHTLY FOR INSOMNIA 4/26/22   Joleen Veras MD   amLODIPine (NORVASC) 5 MG tablet TAKE 1 TABLET BY MOUTH EVERY DAY IN THE EVENING 3/31/22   Joleen Veras MD   levothyroxine (SYNTHROID) 50 MCG tablet TAKE 1 TABLET BY MOUTH EVERY DAY 3/17/22   Joleen Veras MD   fluticasone (FLOVENT HFA) 110 MCG/ACT inhaler INHALE 1 PUFF BY MOUTH TWICE A DAY 3/15/22   Joleen Veras MD   escitalopram (LEXAPRO) 10 MG tablet TAKE 1 TABLET BY MOUTH EVERY DAY 2/1/22   Joleen Veras MD   Rimegepant Sulfate 75 MG TBDP Take 75 mg by mouth daily as needed (migraine) No more than 1 tab in 24 hours; lot [id number] exp 6/2024 1/24/22   HERI Lockhart CNP   butalbital-aspirin-caffeine [facility]) -40 MG capsule Take 1 capsule by mouth daily as needed for Headaches or Migraine (severe headache) for up to 90 days.   Patient not taking: Reported on 3/23/2022 1/24/22 4/24/22  HERI Lockhart CNP   triamcinolone (KENALOG) 0.025 % cream Apply topically 2 times daily for itching associated with insect bite  Patient not taking: Reported on 2/25/2022 10/22/21   Joleen Veras MD   albuterol sulfate  (90 Base) MCG/ACT inhaler 1INHALE 2 PUFFS BY MOUTH EVERY 6 HOURS AS NEEDED FOR SHORTNESS OF BREATH  (ok to dispense brand covered by insurance) 10/19/21   Joleen Veras MD   vitamin D ([facility] D3) 25 MCG (1000 UT) CAPS TAKE 1 CAPSULE DAILY AS DIRECTED 10/19/21   Joleen Veras MD   Multiple Vitamin (DAILY-RAVEN) TABS TAKE 1 TABLET BY MOUTH EVERY DAY 10/19/21   Joleen Veras MD   cyanocobalamin 1000 MCG/ML injection Infuse intravenously every 30 days     Historical Provider, MD       Future Appointments   Date Time Provider Dinesh Matamoros   4/27/2022 11:30 AM Gatokhai Buenrostro, [address] [place] [address]   4/28/2022 11:00 AM [facility]   5/2/2022  8:15 AM Stoney Hopso, [address] [place] [address]   5/4/2022  8:30 AM Gatoy Hopso, [address] [place] [address]   5/10/2022  8:30 AM Perfecto Buenrostro [facility] Jerald [place]   5/13/2022  8:30 AM NEIL Bruce SEYZ [facility]      and   General Assessment

## 2022-04-27 ENCOUNTER — HOSPITAL ENCOUNTER (OUTPATIENT)
Dept: OCCUPATIONAL THERAPY | Age: 67
Setting detail: THERAPIES SERIES
Discharge: HOME | End: 2022-04-27
Payer: MEDICARE

## 2022-04-27 PROCEDURE — 97110 THERAPEUTIC EXERCISES: CPT

## 2022-04-27 PROCEDURE — 97112 NEUROMUSCULAR REEDUCATION: CPT

## 2022-04-27 PROCEDURE — 97530 THERAPEUTIC ACTIVITIES: CPT

## 2022-04-27 RX ORDER — AMLODIPINE BESYLATE 5 MG/1
TABLET ORAL
Qty: 30 TABLET | Refills: 1 | Status: SHIPPED
Start: 2022-04-27 | End: 2022-04-28 | Stop reason: SDUPTHER

## 2022-04-27 NOTE — PROGRESS NOTES
[facility]     OCCUPATIONAL THERAPY PROGRESS NOTE    [facility]  [address]  [place]   Phone: [phone]   Fax: [phone]     Date:  2022    Initial Evaluation Date: 2022   Evaluating Therapist: ZAHIDA Patten    Patient Name:  Farida Arteaga    :  1955    Restrictions/Precautions:  L eye legally blind; moderate fall risk  Diagnosis: R29.6 (ICD-10-CM) - Frequent falls  Date of Surgery/Injury: 21  Referring Practitioner:  Dr. Yamilet Sharpe MD  Specific Practitioner Orders: Eval and treat for fall prevention, DME PRN     Insurance/Certification information: [facility] Medicare  Plan of care signed (Y/N): N  Visit# / total visits: 3 / 8  Reassessment: DUE      Assessment of current deficits   [x]? Functional mobility             [x]? Strength          [x]? Proprioceptive/Postural Awareness       [x]? Endurance          [x]? Balance              OT PLAN OF CARE   OT POC based on physician orders, patient diagnosis and results of clinical assessment.     Frequency/Duration: 2x/wk for 8 visits  /  Certification Period From: 2022  To: 2022     Specific OT Treatment to include:   [x]? Instruction in HEP                     [x]?  Therapeutic Exercise                            [x]? PROM/Stretching                     [x]?  AROM                [x]? Therapeutic Activity                  [x]? Fall Prevention                           [x]?  Spatial/Postural Awareness     [x]? Energy Conservation/Work Simplification  [x]? GM/FM Coordination                [x]? Safety retraining/education per  individual diagnosis/goals     Patient Specific Goal: To improve awareness to the L side of body                             GOALS (Long term same as Short term):  1) Patient will demonstrate good understanding of home program with good accuracy.    2) Patient will demonstrate increased strength throughout bilateral UE's by increasing each affected MMG by a minimum of 1 grade to improve daily functional participation. 3) Patient will complete dynamic standing activity x 15 min with proper postural control and good proprioceptive awareness demonstrated with minimal VCing for 3/4 trials. 4) Patient will demonstrate improved functional activity tolerance from fair to good for ADL/IADL completion. 5) Patient will demonstrate/report proper assimilation of compensatory techniques to complete ADLs and IADLs. 6) Patient will demonstrate/verbalize 4 fall prevention techniques to increase safety when ambulating in the community.      TODAY'S TREATMENT     Pain Level: none    SUBJECTIVE: Patient seen 2/ 2 scheduled visits. States; \"I feel better because I feel more marques and my confidence is higher. \"     OBJECTIVE:  Engaged patient in the following with focus on dynamic Balance, UE strengthening, pt education, comp tech training, functional activity tolerance retraining, . Reassessment by 5/12/22  INTERVENTION: DONE  SPECIFICS/COMMENTS:   Modality:     Fluidotherapy      Scar Mass/Edema Control:          PROM/Stretching:          AROM/AAROM:               Therapeutic Activity:     Peripheral Vision retraining/Standing balance  Ring Tree with focus on center pole. Matching and removing rings while engaged in dynamic standing and wt shift/balance retraining for functional tasks. Balance was declining at 8 min today   Peripheral Vision Retraining  Resistive pinch pin task with pole to left of vision field to encourage eye shift during tasks to assist with reduction of falls due to vision issues. Crossing midline/vision scanning tasks  Shoulder arc for crossing midline and dynamic standing tolerance retraining along with scanning for loss of vision field. Balance was beginning to decline after 8 min standing with no hand hold for balance. No rest needed today.                  NEURO/Balance x Rebounder with throw and catch while working on balance   Valpar board x Overhead task with balance addressed  Completed 1 shape   Valpar maze x alternating foot use on control with balance component addressed   UBE x To improve motor control/coordination and motor endurance of affected UE, scapular mobility/stability challenge with speed/resistance change ups as increased function builds and improvements noted   8 min  8 speed changes today   Strengthening: Forward flexion x 2# Free wts  10 x 3  Balance addressed by support of wall with cueing for core control   Overhead pressups x 3# free wts  10 x 3            \"   Bicep curls x 3#  15 x 3,                         \"        Other:     EDUCATION/HEP x Throughout Session with instructions and handouts as needed   Skilled judgment/interventions x Provided in selection of appropriate interventions with patient education in proper ex tech and purpose for exercises, correct performance of therapeutic ex was facilitated with verbal and visual cuing. ASSESSMENT: Patient shows potential to progress with stated goals and will be educated on HEP and provided written handouts as needed throughout their care. Compliance to HEP in integral geoffrey a successful outcome. States she feels like she should be riding a bike or doing something outside of therapy to help her improve. Pleased with core stabilization and neuro balance tasks today. Feels she can add them to her HEP. Pt is making Fair progress toward stated plan of care. -Rehab Potential: Good -Requires OT Follow Up: Yes    Pt. Education:  [x] Yes  [] No  [x] Reviewed Prior HEP/Ed  Method of Education: [x] Verbal  [x] Demo  [] Written  Comprehension of Education:  [x] Verbalizes understanding. [] Demonstrates understanding. [x] Needs review at next sesion  [] Demonstrates/verbalizes HEP/Ed previously given. PLAN:   [x]  Continues Plan of care: Treatment delivered based on POC and graduated to patient's progress.           Patient education continues at each visit to obtain maximum benefit from skilled OT intervention.   []  Alter Plan of care:   []  Discharge:    Billing:    TIME IN: [address]: 1230TOTAL TREATMENT TIME: 55 TOTAL TIME: 60  CODE  TODAY MINUTES TODAY UNITS  Units USED Units  ALLOWED   57285 Fluidotherapy        16269 Manual        90089 Therapeutic Ex 15 1  3    29028 Therapeutic Activity 15 1  4    21501 ADL/COMP Tech Train        29517 Neuromuscular Re-Ed 25 2  3    34996 OrthoManagementTraining         Modality:         Other                                    TOTAL 55 12[address] [facility], NEIL,  OWEN/L, [id number]

## 2022-04-28 ENCOUNTER — OFFICE VISIT (OUTPATIENT)
Dept: FAMILY MEDICINE CLINIC | Age: 67
End: 2022-04-28
Payer: MEDICARE

## 2022-04-28 VITALS
HEART RATE: 78 BPM | OXYGEN SATURATION: 98 % | DIASTOLIC BLOOD PRESSURE: 70 MMHG | SYSTOLIC BLOOD PRESSURE: 138 MMHG | WEIGHT: 140 LBS | HEIGHT: 64 IN | BODY MASS INDEX: 23.9 KG/M2 | TEMPERATURE: 97.4 F

## 2022-04-28 DIAGNOSIS — F32.89 OTHER DEPRESSION: ICD-10-CM

## 2022-04-28 DIAGNOSIS — G43.109 MIGRAINE WITH AURA AND WITHOUT STATUS MIGRAINOSUS, NOT INTRACTABLE: Chronic | ICD-10-CM

## 2022-04-28 DIAGNOSIS — I10 PRIMARY HYPERTENSION: Primary | ICD-10-CM

## 2022-04-28 PROCEDURE — G8420 CALC BMI NORM PARAMETERS: HCPCS | Performed by: FAMILY MEDICINE

## 2022-04-28 PROCEDURE — G8427 DOCREV CUR MEDS BY ELIG CLIN: HCPCS | Performed by: FAMILY MEDICINE

## 2022-04-28 PROCEDURE — 3017F COLORECTAL CA SCREEN DOC REV: CPT | Performed by: FAMILY MEDICINE

## 2022-04-28 PROCEDURE — 99214 OFFICE O/P EST MOD 30 MIN: CPT | Performed by: FAMILY MEDICINE

## 2022-04-28 PROCEDURE — 1036F TOBACCO NON-USER: CPT | Performed by: FAMILY MEDICINE

## 2022-04-28 RX ORDER — ESCITALOPRAM OXALATE 10 MG/1
TABLET ORAL
Qty: 90 TABLET | Refills: 0 | Status: SHIPPED
Start: 2022-04-28 | End: 2022-07-26

## 2022-04-28 RX ORDER — AMLODIPINE BESYLATE 5 MG/1
TABLET ORAL
Qty: 30 TABLET | Refills: 1 | Status: SHIPPED
Start: 2022-04-28 | End: 2022-05-20

## 2022-04-28 ASSESSMENT — PATIENT HEALTH QUESTIONNAIRE - PHQ9
1. LITTLE INTEREST OR PLEASURE IN DOING THINGS: 0
6. FEELING BAD ABOUT YOURSELF - OR THAT YOU ARE A FAILURE OR HAVE LET YOURSELF OR YOUR FAMILY DOWN: 0
4. FEELING TIRED OR HAVING LITTLE ENERGY: 0
SUM OF ALL RESPONSES TO PHQ QUESTIONS 1-9: 0
3. TROUBLE FALLING OR STAYING ASLEEP: 0
SUM OF ALL RESPONSES TO PHQ9 QUESTIONS 1 & 2: 0
5. POOR APPETITE OR OVEREATING: 0
8. MOVING OR SPEAKING SO SLOWLY THAT OTHER PEOPLE COULD HAVE NOTICED. OR THE OPPOSITE, BEING SO FIGETY OR RESTLESS THAT YOU HAVE BEEN MOVING AROUND A LOT MORE THAN USUAL: 0
10. IF YOU CHECKED OFF ANY PROBLEMS, HOW DIFFICULT HAVE THESE PROBLEMS MADE IT FOR YOU TO DO YOUR WORK, TAKE CARE OF THINGS AT HOME, OR GET ALONG WITH OTHER PEOPLE: 0
SUM OF ALL RESPONSES TO PHQ QUESTIONS 1-9: 0
9. THOUGHTS THAT YOU WOULD BE BETTER OFF DEAD, OR OF HURTING YOURSELF: 0
2. FEELING DOWN, DEPRESSED OR HOPELESS: 0
SUM OF ALL RESPONSES TO PHQ QUESTIONS 1-9: 0
SUM OF ALL RESPONSES TO PHQ QUESTIONS 1-9: 0
7. TROUBLE CONCENTRATING ON THINGS, SUCH AS READING THE NEWSPAPER OR WATCHING TELEVISION: 0

## 2022-04-28 NOTE — ACP (ADVANCE CARE PLANNING)
Advance Care Planning     Advance Care Planning (ACP) Physician/NP/PA Conversation    Date of Conversation: 4/28/2022  Conducted with: Patient with Decision Making Capacity    Healthcare Decision Maker:      Primary Decision Maker: Sona Isbell - Brother/Sister - [phone]    Click here to Kid Care Years including selection of the Healthcare Decision Maker Relationship (ie \"Primary\")  Today we documented Decision Maker(s) consistent with Legal Next of Kin hierarchy. Care Preferences:    Hospitalization: \"If your health worsens and it becomes clear that your chance of recovery is unlikely, what would be your preference regarding hospitalization? \"  The patient would prefer hospitalization. Ventilation: \"If you were unable to breath on your own and your chance of recovery was unlikely, what would be your preference about the use of a ventilator (breathing machine) if it was available to you? \"  The patient would NOT desire the use of a ventilator. Resuscitation: \"In the event your heart stopped as a result of an underlying serious health condition, would you want attempts made to restart your heart, or would you prefer a natural death? \"  No, do NOT attempt to resuscitate.     discussed her experiences with end of life care    Conversation Outcomes / Follow-Up Plan:  ACP in process - completing/providing documents  Reviewed DNR/DNI and patient confirms current DNR status - completed forms on file (place new order if needed)    Length of Voluntary ACP Conversation in minutes:  <16 minutes (Non-Billable)    Momo Marinelli MD

## 2022-04-28 NOTE — PATIENT INSTRUCTIONS
Patient Education        DASH Diet: Care Instructions  Your Care Instructions     The DASH diet is an eating plan that can help lower your blood pressure. DASH stands for Dietary Approaches to Stop Hypertension. Hypertension is high bloodpressure. The DASH diet focuses on eating foods that are high in calcium, potassium, and magnesium. These nutrients can lower blood pressure. The foods that are highest in these nutrients are fruits, vegetables, low-fat dairy products, nuts, seeds, and legumes. But taking calcium, potassium, and magnesium supplements instead of eating foods that are high in those nutrients does not have the same effect. The DASH diet also includes whole grains, fish, and poultry. The DASH diet is one of several lifestyle changes your doctor may recommend to lower your high blood pressure. Your doctor may also want you to decrease the amount of sodium in your diet. Lowering sodium while following the DASH dietcan lower blood pressure even further than just the DASH diet alone. Follow-up care is a key part of your treatment and safety. Be sure to make and go to all appointments, and call your doctor if you are having problems. It's also a good idea to know your test results and keep alist of the medicines you take. How can you care for yourself at home? Following the DASH diet   Eat 4 to 5 servings of fruit each day. A serving is 1 medium-sized piece of fruit, ½ cup chopped or canned fruit, 1/4 cup dried fruit, or 4 ounces (½ cup) of fruit juice. Choose fruit more often than fruit juice.  Eat 4 to 5 servings of vegetables each day. A serving is 1 cup of lettuce or raw leafy vegetables, ½ cup of chopped or cooked vegetables, or 4 ounces (½ cup) of vegetable juice. Choose vegetables more often than vegetable juice.  Get 2 to 3 servings of low-fat and fat-free dairy each day. A serving is 8 ounces of milk, 1 cup of yogurt, or 1 ½ ounces of cheese.  Eat 6 to 8 servings of grains each day.  A serving is 1 slice of bread, 1 ounce of dry cereal, or ½ cup of cooked rice, pasta, or cooked cereal. Try to choose whole-grain products as much as possible.  Limit lean meat, poultry, and fish to 2 servings each day. A serving is 3 ounces, about the size of a deck of cards.  Eat 4 to 5 servings of nuts, seeds, and legumes (cooked dried beans, lentils, and split peas) each week. A serving is 1/3 cup of nuts, 2 tablespoons of seeds, or ½ cup of cooked beans or peas.  Limit fats and oils to 2 to 3 servings each day. A serving is 1 teaspoon of vegetable oil or 2 tablespoons of salad dressing.  Limit sweets and added sugars to 5 servings or less a week. A serving is 1 tablespoon jelly or jam, ½ cup sorbet, or 1 cup of lemonade.  Eat less than 2,300 milligrams (mg) of sodium a day. If you limit your sodium to 1,500 mg a day, you can lower your blood pressure even more.  Be aware that all of these are the suggested number of servings for people who eat 1,800 to 2,000 calories a day. Your recommended number of servings may be different if you need more or fewer calories. Tips for success   Start small. Do not try to make dramatic changes to your diet all at once. You might feel that you are missing out on your favorite foods and then be more likely to not follow the plan. Make small changes, and stick with them. Once those changes become habit, add a few more changes.  Try some of the following:  ? Make it a goal to eat a fruit or vegetable at every meal and at snacks. This will make it easy to get the recommended amount of fruits and vegetables each day. ? Try yogurt topped with fruit and nuts for a snack or healthy dessert. ? Add lettuce, tomato, cucumber, and onion to sandwiches. ? Combine a ready-made pizza crust with low-fat mozzarella cheese and lots of vegetable toppings. Try using tomatoes, squash, spinach, broccoli, carrots, cauliflower, and onions. ?  Have a variety of cut-up vegetables with a low-fat dip as an appetizer instead of chips and dip. ? Sprinkle sunflower seeds or chopped almonds over salads. Or try adding chopped walnuts or almonds to cooked vegetables. ? Try some vegetarian meals using beans and peas. Add garbanzo or kidney beans to salads. Make burritos and tacos with mashed guerrero beans or black beans. Where can you learn more? Go to https://Agensys.MyPrepApp. org and sign in to your [employer] account. Enter K291 in the Jocoos box to learn more about \"DASH Diet: Care Instructions. \"     If you do not have an account, please click on the \"Sign Up Now\" link. Current as of: January 10, 2022               Content Version: 13.2  © 7001-6049 Healthwise, Incorporated. Care instructions adapted under license by [facility] ([facility]). If you have questions about a medical condition or this instruction, always ask your healthcare professional. [address] any warranty or liability for your use of this information.

## 2022-04-28 NOTE — PROGRESS NOTES
4/28/2022    Farida Arteaga is a 61 y.o. female here for   Chief Complaint   Patient presents with    Follow-up     spot on upp, inner right though, no pain or lump     Ordered OT  Had done pT through orthopedics. Ankle fracture    Regarding hypertension. Patient is not monitoring home blood pressures on a regular basis however she did have her bp checked by a visiting nurse and it was normal - 128/78. Cardiovascular risk factors: hypertension. Patient does not smoke. reports that she quit smoking about 4 years ago. Her smoking use included cigarettes. She has a 0.50 pack-year smoking history. She has never used smokeless tobacco.   Currently on amlodipine which was added after last nurse visit . Taking as prescribed. No adverse effects. Today,  BP: 138/70 and she is asymptomatic. BP Readings from Last 3 Encounters:   04/28/22 (!) 154/84   03/09/22 (!) 175/86   02/25/22 (!) 162/82     Patient denies chest pain, diaphoresis, dyspnea, dyspnea on exertion, peripheral edema, palpitations, headache, vision changes. Regarding hypothyroidism, this problem is chronic. This is under fair control on current medication regimen, Levothyroxine 50 mcg. Current symptoms include weight gain. she denies denies fatigue, heat/cold intolerance, bowel/skin changes or CVS symptoms and is  taking her medication consistently on an empty stomach. Most recent labs reviewed with patient. TSH:    Lab Results   Component Value Date    TSH 2.660 07/08/2021        Wt Readings from Last 3 Encounters:   04/28/22 140 lb (63.5 kg)   02/25/22 136 lb (61.7 kg)   01/24/22 140 lb (63.5 kg)       She  reports that she quit smoking about 4 years ago. Her smoking use included cigarettes. She has a 0.50 pack-year smoking history. She has never used smokeless tobacco.    Medications and allergies reviewed and updated in chart.     Current Outpatient Medications   Medication Sig Dispense Refill    amLODIPine (NORVASC) 5 MG tablet TAKE 1 TABLET BY MOUTH EVERY DAY IN THE EVENING 30 tablet 1    mirtazapine (REMERON) 7.5 MG tablet TAKE 1 TABLET BY MOUTH NIGHTLY FOR INSOMNIA 90 tablet 0    levothyroxine (SYNTHROID) 50 MCG tablet TAKE 1 TABLET BY MOUTH EVERY DAY 90 tablet 0    fluticasone (FLOVENT HFA) 110 MCG/ACT inhaler INHALE 1 PUFF BY MOUTH TWICE A DAY 12 each 3    escitalopram (LEXAPRO) 10 MG tablet TAKE 1 TABLET BY MOUTH EVERY DAY 90 tablet 0    Rimegepant Sulfate 75 MG TBDP Take 75 mg by mouth daily as needed (migraine) No more than 1 tab in 24 hours; lot [id number] exp 6/2024 8 tablet 0    albuterol sulfate  (90 Base) MCG/ACT inhaler 1INHALE 2 PUFFS BY MOUTH EVERY 6 HOURS AS NEEDED FOR SHORTNESS OF BREATH  (ok to dispense brand covered by insurance) 1 each 3    vitamin D ([employer] D3) 25 MCG (1000 UT) CAPS TAKE 1 CAPSULE DAILY AS DIRECTED 90 capsule 3    Multiple Vitamin (DAILY-RAVEN) TABS TAKE 1 TABLET BY MOUTH EVERY DAY 90 tablet 3    cyanocobalamin 1000 MCG/ML injection Infuse intravenously every 30 days       butalbital-aspirin-caffeine (FIORINAL) -40 MG capsule Take 1 capsule by mouth daily as needed for Headaches or Migraine (severe headache) for up to 90 days. (Patient not taking: Reported on 3/23/2022) 30 capsule 1    triamcinolone (KENALOG) 0.025 % cream Apply topically 2 times daily for itching associated with insect bite (Patient not taking: Reported on 2/25/2022) 15 g 0     No current facility-administered medications for this visit. Facility-Administered Medications Ordered in Other Visits   Medication Dose Route Frequency Provider Last Rate Last Admin    sodium chloride (PF) 0.9 % injection 10 mL  10 mL IntraVENous PRN Livia Veras MD            Patient'spast medical, surgical, social and/or family history reviewed, updated in chart, and are non-contributory (unless otherwise stated).       Review of Systems  Review of Systems    PE:  VS:  /70   Pulse 78   Temp 97.4 °F (36.3 °C)   Ht 5' 4\" (1.626 m)   Wt 140 lb (63.5 kg)   LMP 01/02/2015   SpO2 98%   BMI 24.03 kg/m²   Physical Exam  Constitutional:       Appearance: She is well-developed. HENT:      Head: Normocephalic and atraumatic. Comments: goiter  Cardiovascular:      Rate and Rhythm: Normal rate and regular rhythm. Heart sounds: No murmur heard. No friction rub. No gallop. Pulmonary:      Effort: Pulmonary effort is normal.      Breath sounds: Normal breath sounds. No wheezing or rales. Skin:     General: Skin is warm and dry. Neurological:      Mental Status: She is alert and oriented to person, place, and time. Assessment/Plan:  Arcadio Kirkland was seen today for follow-up. Diagnoses and all orders for this visit:    Primary hypertension  Repeat improved  Cont amlodipine for now  Repeat labs  -     LIPID PANEL; Future  -     Comprehensive Metabolic Panel; Future  -     amLODIPine (NORVASC) 5 MG tablet; TAKE 1 TABLET BY MOUTH EVERY DAY IN THE EVENING      Discussion of living/ will code status - see separate note. She was clear about wanting a natural death but for the time being wanting full care/ services in case of illness or injury. She has incomplete living will and healthcare power of  forms. She will complete and give us a copy. -     DNR comfort care - arrest        Return in about 3 months (around 7/28/2022) for medicare wellness, fasting labs, followup visit 1 week after. Advised patient to call with any new medication issues. All questions answered.   Call or go to emergency department ifsymptoms worsen or persist.

## 2022-04-29 ENCOUNTER — TELEPHONE (OUTPATIENT)
Dept: CARE COORDINATION | Age: 67
End: 2022-04-29

## 2022-05-02 ENCOUNTER — HOSPITAL ENCOUNTER (OUTPATIENT)
Dept: OCCUPATIONAL THERAPY | Age: 67
Setting detail: THERAPIES SERIES
Discharge: HOME | End: 2022-05-02
Payer: MEDICARE

## 2022-05-02 PROCEDURE — 97530 THERAPEUTIC ACTIVITIES: CPT

## 2022-05-02 PROCEDURE — 97112 NEUROMUSCULAR REEDUCATION: CPT

## 2022-05-02 PROCEDURE — 97110 THERAPEUTIC EXERCISES: CPT

## 2022-05-02 NOTE — PROGRESS NOTES
[facility]     OCCUPATIONAL THERAPY PROGRESS NOTE    [facility] [place] [place] [facility]  [address]  [place]   Phone: [phone]   Fax: [phone]     Date:  2022    Initial Evaluation Date: 2022   Evaluating Therapist: ZAHIDA Marin    Patient Name:  Damaris Kehr    :  1955    Restrictions/Precautions:  L eye legally blind; moderate fall risk  Diagnosis: R29.6 (ICD-10-CM) - Frequent falls  Date of Surgery/Injury: 21  Referring Practitioner:  Dr. Fatemeh Gomez MD  Specific Practitioner Orders: Eval and treat for fall prevention, DME PRN     Insurance/Certification information: [facility] Medicare  Plan of care signed (Y/N): N  Visit# / total visits:   Reassessment: DUE      Assessment of current deficits   [x]? Functional mobility             [x]? Strength          [x]? Proprioceptive/Postural Awareness       [x]? Endurance          [x]? Balance              OT PLAN OF CARE   OT POC based on physician orders, patient diagnosis and results of clinical assessment.     Frequency/Duration: 2x/wk for 8 visits  /  Certification Period From: 2022  To: 2022     Specific OT Treatment to include:   [x]? Instruction in HEP                     [x]?  Therapeutic Exercise                            [x]? PROM/Stretching                     [x]?  AROM                [x]? Therapeutic Activity                  [x]? Fall Prevention                           [x]?  Spatial/Postural Awareness     [x]? Energy Conservation/Work Simplification  [x]? GM/FM Coordination                [x]? Safety retraining/education per  individual diagnosis/goals     Patient Specific Goal: To improve awareness to the L side of body                             GOALS (Long term same as Short term):  1) Patient will demonstrate good understanding of home program with good accuracy.    2) Patient will demonstrate increased strength throughout bilateral UE's by increasing each affected MMG by a minimum of 1 grade to improve daily functional participation. 3) Patient will complete dynamic standing activity x 15 min with proper postural control and good proprioceptive awareness demonstrated with minimal VCing for 3/4 trials. 4) Patient will demonstrate improved functional activity tolerance from fair to good for ADL/IADL completion. 5) Patient will demonstrate/report proper assimilation of compensatory techniques to complete ADLs and IADLs. 6) Patient will demonstrate/verbalize 4 fall prevention techniques to increase safety when ambulating in the community.      TODAY'S TREATMENT     Pain Level: none    SUBJECTIVE: Patient seen 2/ 2 scheduled visits. States; \"I feel better because I feel more marques and my confidence is higher. \"     OBJECTIVE:  Engaged patient in the following with focus on dynamic Balance, UE strengthening, pt education, comp tech training, functional activity tolerance retraining, . Reassessment by 5/12/22 for discharge assessment  INTERVENTION: DONE  SPECIFICS/COMMENTS:   Modality:     Fluidotherapy      Scar Mass/Edema Control:          PROM/Stretching:          AROM/AAROM:               Therapeutic Activity:     Peripheral Vision retraining/Standing balance  Ring Tree with focus on center pole. Matching and removing rings while engaged in dynamic standing and wt shift/balance retraining for functional tasks. Balance was declining at 8 min today   Peripheral Vision Retraining  Resistive pinch pin task with pole to left of vision field to encourage eye shift during tasks to assist with reduction of falls due to vision issues. Crossing midline/vision scanning tasks  Shoulder arc for crossing midline and dynamic standing tolerance retraining along with scanning for loss of vision field. Balance was beginning to decline after 8 min standing with no hand hold for balance. No rest needed today.                  NEURO/Balance x Rebounder with throw and catch while working on balance   Valpar board x Overhead task with balance addressed  Completed 1 shape   Valpar maze x alternating foot use on control with balance component addressed   UBE x To improve motor control/coordination and motor endurance of affected UE, scapular mobility/stability challenge with speed/resistance change ups as increased function builds and improvements noted   8 min  8 speed changes today   Strengthening: Forward flexion x 2# Free wts  10 x 3  Balance addressed by support of wall with less cueing for core control   Overhead pressups x 3# free wts  10 x 3            \"   Bicep curls x 3#  15 x 3,                         \"        Other:     EDUCATION/HEP x Throughout Session with instructions and handouts as needed   Skilled judgment/interventions x Provided in selection of appropriate interventions with patient education in proper ex tech and purpose for exercises, correct performance of therapeutic ex was facilitated with verbal and visual cuing. ASSESSMENT: Patient shows potential to progress with stated goals and will be educated on HEP and provided written handouts as needed throughout their care. Pleased with core stabilization and neuro balance tasks today. Feels she can add them to her HEP. Preparing for discharge. Pt is making Fair progress toward stated plan of care. -Rehab Potential: Good -Requires OT Follow Up: Yes    Pt. Education:  [x] Yes  [] No  [x] Reviewed Prior HEP/Ed  Method of Education: [x] Verbal  [x] Demo  [] Written  Comprehension of Education:  [x] Verbalizes understanding. [] Demonstrates understanding. [x] Needs review at next sesion  [] Demonstrates/verbalizes HEP/Ed previously given. PLAN:   [x]  Continues Plan of care: Treatment delivered based on POC and graduated to patient's progress. Patient education continues at each visit to obtain maximum benefit from skilled OT intervention.   []  Alter Plan of care:   []  Discharge:    Billing:    TIME IN: [address]: 1230TOTAL TREATMENT TIME: 55 TOTAL TIME: 60  CODE  TODAY MINUTES TODAY UNITS  Units USED Units  ALLOWED   06760 Fluidotherapy        24816 Manual        22358 Therapeutic Ex 15 1  3    44679 Therapeutic Activity 15 1  4    73043 ADL/COMP Tech Train        79382 Neuromuscular Re-Ed 25 2  3    56825 OrthoManagementTraining         Modality:         Other                                    TOTAL 55 [address] [facility] [address], NEIL,  OWEN/L, [id number]

## 2022-05-04 ENCOUNTER — HOSPITAL ENCOUNTER (OUTPATIENT)
Dept: PHYSICAL THERAPY | Age: 67
Setting detail: THERAPIES SERIES
Discharge: HOME | End: 2022-05-04
Payer: MEDICARE

## 2022-05-04 ENCOUNTER — HOSPITAL ENCOUNTER (OUTPATIENT)
Dept: OCCUPATIONAL THERAPY | Age: 67
Setting detail: THERAPIES SERIES
Discharge: HOME | End: 2022-05-04
Payer: MEDICARE

## 2022-05-04 PROCEDURE — 97112 NEUROMUSCULAR REEDUCATION: CPT

## 2022-05-04 PROCEDURE — 97530 THERAPEUTIC ACTIVITIES: CPT

## 2022-05-04 PROCEDURE — 97110 THERAPEUTIC EXERCISES: CPT

## 2022-05-04 NOTE — PROGRESS NOTES
[address] [facility]                Phone: [phone]   Fax: [phone]    Physical Therapy Daily Treatment Note  Date:  2022    Patient Name:  Winston Kothari    :    MRN: [id number]      Evaluating therapist:  LUCRETIA Villeda                    (3/31/22)   Referring Physician:  Jed Patton.  Diagnosis:  Recurrent falls   Restrictions/Precautions: Insurance/Certification information:  [facility] Medicare    cert dates:    to  22                       ICD-10:  R29.6  Plan of care signed (Y/N):  Y  Visit# / total visits:   -  Pain level:  0/10       Time In:     0928  Time Out:  1030    Subjective:  Patient presents for first treatment session since initial evaluation (3/31/22). She reports she is not having pain in her ankle. She states that it sometimes tingles to \"let me know it's still there\". She denies any falls since initial evaluation. Patient has completed and is discharged from OT today. Exercises:  Exercise/Equipment Resistance/Repetitions Comments   StepOne   8 min L2         Rocker board 3-way 20x ea          side/side amb 5 laps  // bars 2 HR light touch   Tandem walk  Fwd/Bwd 5 laps  // bars  1 HR light touch        Foam marching 1 min 3x         sit <> stand 3 x10          Calf raises wedge 3 x10           stretchwedge 3 x15s           Step ups - fwd 2 x15 ea L/R  6\"         standing TKE with SC   2 x15  ea L/R    red flexband           Total Gym squats  BL nt                    Objective:    Ther. exercise/activity as listed per flow  sheet above. No modalities. Strength L ankle 4/5  Gait: slow/guarded mena with decreased heel-toe progression and stance L LE due to ankle pain    Assessment:     Patient performs exercises with good effort and pacing. Pt demonstrates reduced control in//out of TKE (R worse than L) with WB activities as noted with fwd step ups.     Endurance for prolonged  activities GOOD-/GOOD  Static/dynamic balance GOOD-          Goals:  Time Frame for goals: 3-4 weeks  goal 1: decrease pain across L ankle with all prolonged activities, 0-3/10  goal 2: increase strength across L ankle to grossly 4, 4+/5 for all ranges improving static/dynamic balance to GOOD/GOOD+  goal 3: restore independent gait mechanics, heel-toe progression in  L LE during gait  goal 4: improve endurance for all prolonged activities to GOOD/GOOD+  goal 5: assure I with HEP for home management of condition       Home Exercise Program:  provided 3/31/22     Manual Treatments:      Modalities:  IFC/MH to L ankle PRN       Treatment/Activity Tolerance:  [x] Patient tolerated treatment well [] Patient limited by fatigue  [] Patient limited by pain  [] Patient limited by other medical complications  [] Other:     Prognosis: [] Good [] Fair  [] Poor    Patient Requires Follow-up: [x] Yes  [] No    Plan:   [x] Continue per plan of care [] Alter current plan (see comments)  [] Plan of care initiated [] Hold pending MD visit [] Discharge    Plan for Next Session:  Continue POC with progressions per patient tolerance.      See Weekly Progress Note: []  Yes  [x]  No  Next due:       CPT codes 5/4/2022 Units    Low Complexity PT evaluation 76557 44199    Moderate Complexity PT evaluation  65151    High Complexity PT evaluation 16721    PT Re-evaluation  91316    Gait training 23844    Manual therapy  66659    Therapeutic activities  99516 2   Therapeutic exercises  50136 2   Neuromuscular reeducation  73485             Electronically signed by:  Satya Porter, PTA [id number]

## 2022-05-04 NOTE — PROGRESS NOTES
[facility]     OCCUPATIONAL THERAPY PROGRESS NOTE  DISCHARGE SUMMARY    [facility]  [address]  [place]   Phone: [phone]   Fax: [phone]     Date:  2022    Initial Evaluation Date: 2022   Evaluating Therapist: ZAHIDA Sevilla    Patient Name:  Neela Vieira    :  1955    Restrictions/Precautions:  L eye legally blind; moderate fall risk  Diagnosis: R29.6 (ICD-10-CM) - Frequent falls  Date of Surgery/Injury: 21  Referring Practitioner:  Dr. Cristal Llamas MD  Specific Practitioner Orders: Eval and treat for fall prevention, DME PRN     Insurance/Certification information: [place] Medicare  Plan of care signed (Y/N): N  Visit# / total visits: -8       Assessment of current deficits   [x]? Functional mobility             [x]? Strength          [x]? Proprioceptive/Postural Awareness       [x]? Endurance          [x]? Balance              OT PLAN OF CARE   OT POC based on physician orders, patient diagnosis and results of clinical assessment.     Frequency/Duration: 2x/wk for 8 visits  /  Certification Period From: 2022  To: 2022     Specific OT Treatment to include:   [x]? Instruction in HEP                     [x]?  Therapeutic Exercise                            [x]? PROM/Stretching                     [x]?  AROM                [x]? Therapeutic Activity                  [x]? Fall Prevention                           [x]?  Spatial/Postural Awareness     [x]? Energy Conservation/Work Simplification  [x]? GM/FM Coordination                [x]? Safety retraining/education per  individual diagnosis/goals     Patient Specific Goal: To improve awareness to the L side of body      Goal reached: reports no falls this weekend                             GOALS (Long term same as Short term):  1) Patient will demonstrate good understanding of home program with good accuracy.     Goal Reached  2) Patient will demonstrate increased strength throughout bilateral UE's by increasing each affected MMG by a minimum of 1 grade to improve daily functional participation. Goal Reached:  See below  3) Patient will complete dynamic standing activity x 15 min with proper postural control and good proprioceptive awareness demonstrated with minimal VCing for 3/4 trials. Goal Reached  4) Patient will demonstrate improved functional activity tolerance from fair to good for ADL/IADL completion. Goal reached  5) Patient will demonstrate/report proper assimilation of compensatory techniques to complete ADLs and IADLs. Goal Reached  6) Patient will demonstrate/verbalize 4 fall prevention techniques to increase safety when ambulating in the community. Goal Reached   TODAY'S TREATMENT     Pain Level: none    SUBJECTIVE: Patient seen 2/ 2 scheduled visits. States; \"Therapy helped me. \"     OBJECTIVE:  Engaged patient in the following with focus on dynamic Balance, UE strengthening, pt education, comp tech training, functional activity tolerance retraining    INTERVENTION: DONE  SPECIFICS/COMMENTS:   Modality:     Fluidotherapy      Scar Mass/Edema Control:          PROM/Stretching:          AROM/AAROM:               Therapeutic Activity:     Peripheral Vision retraining/Standing balance  Ring Tree with focus on center pole. Matching and removing rings while engaged in dynamic standing and wt shift/balance retraining for functional tasks. Balance was declining at 8 min today   Peripheral Vision Retraining  Resistive pinch pin task with pole to left of vision field to encourage eye shift during tasks to assist with reduction of falls due to vision issues. Crossing midline/vision scanning tasks  Shoulder arc for crossing midline and dynamic standing tolerance retraining along with scanning for loss of vision field. Balance was beginning to decline after 8 min standing with no hand hold for balance.   No rest needed today.                 NEURO/Balance x Rebounder with throw and catch while working on balance   Valpar board x Overhead task with balance addressed  Completed 1 shape   Valpar maze x alternating foot use on control with balance component addressed   UBE x To improve motor control/coordination and motor endurance of affected UE, scapular mobility/stability challenge with speed/resistance change ups as increased function builds and improvements noted   8 min  8 speed changes today   Strengthening: Forward flexion x 2# Free wts  10 x 3  Balance addressed by support of wall with less cueing for core control   Overhead pressups x 3# free wts  10 x 3            \"   Bicep curls x 3#  15 x 3,                         \"        Other:     Comp Tech training x throught out sessions with functional tasks retraining   EDUCATION/HEP x Throughout Session with instructions and handouts as needed   Skilled judgment/interventions x Provided in selection of appropriate interventions with patient education in proper ex tech and purpose for exercises, correct performance of therapeutic ex was facilitated with verbal and visual cuing. ASSESSMENT: Patient has reached maximum potential with stated goals at this time. Continue HEP to maintain and/or progress functional status. Future need for OT could be considered should the patient's condition improve and adherence to HEP is noted. Patient benefited from skilled OT intervention at this time.          Manual Muscle Testing (MMT)   /   Grade (0-5/5)     EVAL       EVAL Discharge    L UE R UE LUE RUE   Shoulder Flex/Ext 4-/5 4-/5 5/5 5-/5   Shoulder Abd/Add 4-/5 4-/5 4+/5 4+/5   Int/Ext Rotation 4-/5 4-/5 4/5 4/5   Elbow Flex/Ext 4/5 4/5 5/5 54/5   Wrist Flex/Ext 4-/5 4-/5 5-/5 5-/5   Finger Abd/Add 4-/5 4-/5 4+/5 4/5         5/4/2022  Dynamometer (setting 2):                              Left: 37#            45#                                       Right: 36# 44#  Pinch Meter:              Lateral: Left= 11#,    12#    Right= 8#      9.5#              Palmar 3 point: Left= 10#,   11#    Right= 9#   10.5#     QuickDASH Score: 0% disability  NT     Pt is making Fair progress toward stated plan of care. -Rehab Potential: Good -Requires OT Follow Up: Yes    Pt. Education:  [x] Yes  [] No  [x] Reviewed Prior HEP/Ed  Method of Education: [x] Verbal  [x] Demo  [] Written  Comprehension of Education:  [x] Verbalizes understanding. [] Demonstrates understanding. [x] Needs review at next sesion  [x] Demonstrates/verbalizes HEP/Ed previously given. PLAN:   []  Continues Plan of care: Treatment delivered based on POC and graduated to patient's progress. Patient education continues at each visit to obtain maximum benefit from skilled OT intervention. []  Alter Plan of care:   [x]  Discharge:    Billing:    TIME IN: [address]: 12[address]TOTAL TREATMENT TIME: 55 TOTAL TIME: 60  CODE  TODAY MINUTES TODAY UNITS  Units USED Units  ALLOWED   07551 Fluidotherapy        16621 Manual        29832 Therapeutic Ex 15 1  3    95020 Therapeutic Activity 15 1  4    73312 ADL/COMP Tech Train        53372 Neuromuscular Re-Ed 25 2  3    58261 OrthoManagementTraining         Modality:         Other                                    TOTAL 55 [address] [facility] [address], NEIL,  OWEN/L, [id number]    I agree with the above assessment. No further OT needs at this time. Pt to continue with home program. Discharge Pt this date.     Murray Noriega OTR/L  [id number]

## 2022-05-10 ENCOUNTER — HOSPITAL ENCOUNTER (OUTPATIENT)
Dept: OCCUPATIONAL THERAPY | Age: 67
Setting detail: THERAPIES SERIES
End: 2022-05-10
Payer: MEDICARE

## 2022-05-10 ENCOUNTER — HOSPITAL ENCOUNTER (OUTPATIENT)
Dept: PHYSICAL THERAPY | Age: 67
Setting detail: THERAPIES SERIES
Discharge: HOME | End: 2022-05-10
Payer: MEDICARE

## 2022-05-10 PROCEDURE — 97530 THERAPEUTIC ACTIVITIES: CPT

## 2022-05-10 PROCEDURE — 97110 THERAPEUTIC EXERCISES: CPT

## 2022-05-10 NOTE — PROGRESS NOTES
[address] [facility]                Phone: [phone]   Fax: [phone]    Physical Therapy Daily Treatment Note  Date:  5/10/2022    Patient Name:  Kash Mas    :    MRN: [id number]      Evaluating therapist:  LUCRETIA Spaulding                    (3/31/22)   Referring Physician:  Gabino Roy  Diagnosis:  Recurrent falls   Restrictions/Precautions: Insurance/Certification information:  [facility] Medicare    cert dates:    to  22  ICD-10:  R29.6  Plan of care signed (Y/N):  Y  Visit# / total visits:   3/6-  Pain level:  0/10       Time In:     0932  Time Out:  1038     Subjective:  Patient presents for first of two scheduled treatment sessions this week. She reports no pain today in ankle or otherwise. She denies any falls  since previous treatment session. Exercises:  Exercise/Equipment Resistance/Repetitions Comments   StepOne   8 min L2         Rocker board 3-way 20x ea          side/side amb 5 laps  // bars 2 HR light touch   Tandem walk  Fwd/Bwd 5 laps  // bars  1 HR light touch        Foam marching 1 min 3x         sit <> stand 3 x10  green chair        Calf raises wedge 3 x10           stretch wedge 3 x15s           Step ups - fwd 2 x15 ea L/R  6\"         standing TKE with SC   2 x15  ea L/R    red flexband           Total Gym squats  BL 2 x15    L7                    Objective:    Ther. exercise/activity as listed per flow  sheet above. No modalities. Strength L ankle 4 to 4+/5  Gait: mildly slowed mena with decreased heel-toe progression (improved with vc) with equal stance time BL     Assessment:     Patient performs exercises with good effort and pacing.    Endurance for prolonged  activities GOOD  Static/dynamic balance GOOD          Goals:  Time Frame for goals: 3-4 weeks  goal 1: decrease pain across L ankle with all prolonged activities, 0-3/10  goal 2: increase strength across L ankle to grossly 4, 4+/5 for all ranges improving static/dynamic balance to GOOD/GOOD+  goal 3: restore independent gait mechanics, heel-toe progression in  L LE during gait  goal 4: improve endurance for all prolonged activities to GOOD/GOOD+  goal 5: assure I with HEP for home management of condition       Home Exercise Program:  provided 3/31/22     Manual Treatments:      Modalities:  IFC/MH to L ankle PRN       Treatment/Activity Tolerance:  [x] Patient tolerated treatment well [] Patient limited by fatigue  [] Patient limited by pain  [] Patient limited by other medical complications  [] Other:     Prognosis: [] Good [] Fair  [] Poor    Patient Requires Follow-up: [x] Yes  [] No    Plan:   [x] Continue per plan of care [] Alter current plan (see comments)  [] Plan of care initiated [] Hold pending MD visit [] Discharge    Plan for Next Session:  Continue POC with progressions per patient tolerance.      See Weekly Progress Note: []  Yes  [x]  No  Next due:       CPT codes 5/10/2022 Units    Low Complexity PT evaluation 04951 67158    Moderate Complexity PT evaluation  35572    High Complexity PT evaluation 90448    PT Re-evaluation  19655    Gait training 52675    Manual therapy  41731    Therapeutic activities  54210 2   Therapeutic exercises  30057 2   Neuromuscular reeducation  70197             Electronically signed by:  Satya Guerrero Che, DELANEY [id number]

## 2022-05-11 ENCOUNTER — CARE COORDINATION (OUTPATIENT)
Dept: CARE COORDINATION | Age: 67
End: 2022-05-11

## 2022-05-11 NOTE — CARE COORDINATION
Ambulatory Care Coordination Note  5/11/2022  CM Risk Score: 5  Charlson 10 Year Mortality Risk Score: 47%     ACC: Arpit Patino RN    Summary Note:   *CC f/u  -Reports doing well currently, denies any issues, needs, or concerns. Denies any recent falls, reports that her left ankle has some tingling occasionally, reports that she continues to wear her insoles as well  -Reviewed medications, pt taking as prescribed  -Pt completed OT at [facility] and is now receiving PT at [facility], next appt 5/13 @0930am. Pt receives transportation through her insurance carrier  -Pt reports that she was notified that her BP monitor she ordered through the OTC benefit with [facility] is on the way. Reports that she will have her sister who lives across the [place] show her how to use it once it arrives  -Reports that she has completed filling out her advance directives and they have been notarized. Reports that she is waiting to purchase ink for her printer and her daughter will assist her in printing copies, reports that she will take a copy to PCP's office at that time  -Discussed graduating pt from [address] [place] [address], pt agreeable, education complete and goals met    *Asthma  -Reports stable condition, reports that she did have some SOB when the weather was very hot over the past few weeks, reports using her Flovent inhaler BID and Albuterol PRN. Reports that when it's hot out, she will stay inside and turn her air conditioner on to keep her cool  -Denies any asthma issues or concerns at this time    *Appts  - PT [email or web address]  -Blood work appt 7/20 @0920am  -PCP 7/29 @10am  -Pt plans to attend all appts and has transportation to appts  -Discussed when to call provider for symptoms or changes in condition and what level of care to seek according to symptoms, verbalized understanding    *Plan  -Graduate pt from , education complete and goals met  -Pt to contact ACM if any needs or questions arise in the future.  Confirmed pt has contact information   -Update PCP of pt being graduated from [address] [place] [address] [employer]    Program Enrollment: Complex Care  Referral from Primary Care Provider: Yes  Suggested Interventions and Community Resources  [address] [address]: Not Started (Comment: 2/25-discussed)  Fall Risk Prevention: Completed (Comment: 2/25-discussed )  Occupational Therapy: Completed (Comment: 2/25-order placed by PCP)  Pharmacist: Declined (Comment: 2/25-discussed )  Registered Dietician: Not Started (Comment: 2/25-discussed)  Other Services: Completed (Comment: 3/4-discussed again;2/25-ACPS-discussed)  Transportation Support: Completed         Goals Addressed    None         Prior to Admission medications    Medication Sig Start Date End Date Taking? Authorizing Provider   escitalopram (LEXAPRO) 10 MG tablet TAKE 1 TABLET BY MOUTH EVERY DAY 4/28/22   Quynh Louise MD   amLODIPine (NORVASC) 5 MG tablet TAKE 1 TABLET BY MOUTH EVERY DAY IN THE EVENING 4/28/22   Quynh Louise MD   mirtazapine (REMERON) 7.5 MG tablet TAKE 1 TABLET BY MOUTH NIGHTLY FOR INSOMNIA 4/26/22   Quynh Louise MD   levothyroxine (SYNTHROID) 50 MCG tablet TAKE 1 TABLET BY MOUTH EVERY DAY 3/17/22   Quynh Louise MD   fluticasone (FLOVENT HFA) 110 MCG/ACT inhaler INHALE 1 PUFF BY MOUTH TWICE A DAY 3/15/22   Quynh Louise MD   Rimegepant Sulfate 75 MG TBDP Take 75 mg by mouth daily as needed (migraine) No more than 1 tab in 24 hours; lot [id number] exp 6/2024 1/24/22   HERI Huitron CNP   butalbital-aspirin-caffeine [facility]) -40 MG capsule Take 1 capsule by mouth daily as needed for Headaches or Migraine (severe headache) for up to 90 days.   Patient not taking: Reported on 3/23/2022 1/24/22 4/24/22  HERI Huitron CNP   triamcinolone (KENALOG) 0.025 % cream Apply topically 2 times daily for itching associated with insect bite  Patient not taking: Reported on 2/25/2022 10/22/21   Quynh Louise MD   albuterol sulfate  (90 Base) MCG/ACT inhaler 1INHALE 2 PUFFS BY MOUTH EVERY 6 HOURS AS NEEDED FOR SHORTNESS OF BREATH  (ok to dispense brand covered by insurance) 10/19/21   Marina Jaime MD   vitamin D ([facility] D3) 25 MCG (1000 UT) CAPS TAKE 1 CAPSULE DAILY AS DIRECTED 10/19/21   Marina Jaime MD   Multiple Vitamin (DAILY-RAVEN) TABS TAKE 1 TABLET BY MOUTH EVERY DAY 10/19/21   Marina Jaime MD   cyanocobalamin 1000 MCG/ML injection Infuse intravenously every 30 days     Historical Provider, MD       Future Appointments   Date Time Provider Dinesh Matamoros   5/13/2022  9:30  [facility]   5/17/2022  9:30  [facility]   5/20/2022  9:30 AM Rebecca rCuz SEYZ PT [facility]   7/20/2022  9:20 AM SCHEDULE, [facility]  [facility]   7/29/2022 10:00 AM MD Cesar Bang[facility]      and   General Assessment

## 2022-05-11 NOTE — TELEPHONE ENCOUNTER
Great. Thank you for everything. Otolaryngologist Procedure Text (A): After obtaining clear surgical margins the patient was sent to otolaryngology for surgical repair.  The patient understands they will receive post-surgical care and follow-up from the referring physician's office.

## 2022-05-13 ENCOUNTER — APPOINTMENT (OUTPATIENT)
Dept: OCCUPATIONAL THERAPY | Age: 67
End: 2022-05-13
Payer: MEDICARE

## 2022-05-13 ENCOUNTER — HOSPITAL ENCOUNTER (OUTPATIENT)
Dept: PHYSICAL THERAPY | Age: 67
Setting detail: THERAPIES SERIES
Discharge: HOME | End: 2022-05-13
Payer: MEDICARE

## 2022-05-13 PROCEDURE — 97530 THERAPEUTIC ACTIVITIES: CPT

## 2022-05-13 PROCEDURE — 97110 THERAPEUTIC EXERCISES: CPT

## 2022-05-13 NOTE — PROGRESS NOTES
[address] [facility]                Phone: [phone]   Fax: [phone]    Physical Therapy Daily Treatment Note  Date:  5/10/2022    Patient Name:  Shelley Hunt    :    MRN: [id number]      Evaluating therapist:  LUCRETIA Guillen                    (3/31/22)   Referring Physician:  Simeon Arredondo.  Diagnosis:  Recurrent falls   Restrictions/Precautions: Insurance/Certification information:  [facility] Medicare    cert dates:    to  22  ICD-10:  R29.6  Plan of care signed (Y/N):  Y  Visit# / total visits:   -  Pain level:   0/10       Time In:     0930  Time Out:  1038      Subjective:  Patient presents for second of two scheduled treatment sessions this week. She reports no pain today. Exercises:  Exercise/Equipment Resistance/Repetitions Comments   StepOne   8 min L2         Rocker board 3-way 25x ea          side/side amb 8 laps  // bars 2 HR light touch   Tandem walk  Fwd/Bwd 8 laps  // bars  1 HR light touch        Foam marching 1 min 3x         sit <> stand 3 x10  green chair        Calf raises wedge 3 x10           stretch wedge 3 x15s           Step ups - fwd 2 x15 ea L/R  6\"         standing TKE with SC   2 x15  ea L/R    red flexband           Total Gym squats  BL 2 x15    L7                    Objective:    Ther. exercise/activity as listed per flow  sheet above. No modalities. Assessment:     Patient performs exercises with good effort and pacing.            Goals:  Time Frame for goals: 3-4 weeks  goal 1: decrease pain across L ankle with all prolonged activities, 0-3/10  goal 2: increase strength across L ankle to grossly 4, 4+/5 for all ranges improving static/dynamic balance to GOOD/GOOD+  goal 3: restore independent gait mechanics, heel-toe progression in  L LE during gait  goal 4: improve endurance for all prolonged activities to GOOD/GOOD+  goal 5: assure I with HEP for home management of condition       Home Exercise Program:  provided 3/31/22     Manual Treatments:      Modalities:  nt      Treatment/Activity Tolerance:  [x] Patient tolerated treatment well [] Patient limited by fatigue  [] Patient limited by pain  [] Patient limited by other medical complications  [] Other:     Prognosis: [] Good [] Fair  [] Poor    Patient Requires Follow-up: [x] Yes  [] No    Plan:   [x] Continue per plan of care [] Alter current plan (see comments)  [] Plan of care initiated [] Hold pending MD visit [] Discharge    Plan for Next Session:  Continue POC with progressions per patient tolerance.      See Weekly Progress Note: []  Yes  [x]  No  Next due:       CPT codes 5/13/2022 Units    Low Complexity PT evaluation 81063 40967    Moderate Complexity PT evaluation  35566    High Complexity PT evaluation 20057    PT Re-evaluation  28855    Gait training 64824    Manual therapy  39675    Therapeutic activities  81623 2   Therapeutic exercises  70881 2   Neuromuscular reeducation  19051             Electronically signed by:  Satya Hansen, DELANEY [id number]

## 2022-05-17 ENCOUNTER — HOSPITAL ENCOUNTER (OUTPATIENT)
Dept: PHYSICAL THERAPY | Age: 67
Setting detail: THERAPIES SERIES
Discharge: HOME | End: 2022-05-17
Payer: MEDICARE

## 2022-05-17 PROCEDURE — 97530 THERAPEUTIC ACTIVITIES: CPT

## 2022-05-17 PROCEDURE — 97110 THERAPEUTIC EXERCISES: CPT

## 2022-05-17 NOTE — PROGRESS NOTES
[address] [facility]                Phone: [phone]   Fax: [phone]    Physical Therapy Daily Treatment Note  Date:  5/10/2022    Patient Name:  Dulce Arriola    :    MRN: [id number]      Evaluating therapist:  LUCRETIA Mac                    (3/31/22)   Referring Physician:  Connie Cheadle.  Diagnosis:  Recurrent falls   Restrictions/Precautions: Insurance/Certification information:  [facility] Medicare    cert dates:  20  to  22  ICD-10:  R29.6  Plan of care signed (Y/N):  Y  Visit# / total visits:    5/6   Pain level: 0/10       Time In:     7470  Time Out:  1023      Subjective:  Patient presents for first of two scheduled treatment sessions this week. Exercises:  Exercise/Equipment Resistance/Repetitions Comments   StepOne   8 min L2         Rocker board 3-way 25x ea          side/side amb 8 laps  // bars 2 HR light touch   Tandem walk  Fwd/Bwd 8 laps  // bars  1 HR light touch        Foam marching 3x 1 min         sit <> stand 3 x10  green chair        Calf raises wedge 3 x10           stretch wedge 3 x15s           Step ups - fwd 2 x15 ea L/R  6\"         standing TKE with SC   2 x20  ea L/R    red flexband           Total Gym squats  BL 3 x15    L7                    Objective:    Ther. exercise/activity as listed per flow  sheet above. No modalities. Assessment:     Patient performs exercises with good effort and pacing.          Goals:  Time Frame for goals: 3-4 weeks  goal 1: decrease pain across L ankle with all prolonged activities, 0-3/10  goal 2: increase strength across L ankle to grossly 4, 4+/5 for all ranges improving static/dynamic balance to GOOD/GOOD+  goal 3: restore independent gait mechanics, heel-toe progression in  L LE during gait  goal 4: improve endurance for all prolonged activities to GOOD/GOOD+  goal 5: assure I with [facility] for home management of condition     Home Exercise Program:  provided 3/31/22     Manual Treatments: Modalities:  nt    Treatment/Activity Tolerance:  [x] Patient tolerated treatment well [] Patient limited by fatigue  [] Patient limited by pain  [] Patient limited by other medical complications  [] Other:     Prognosis: [] Good [] Fair  [] Poor    Patient Requires Follow-up: [x] Yes  [] No    Plan:   [x] Continue per plan of care [] Alter current plan (see comments)  [] Plan of care initiated [] Hold pending MD visit [] Discharge    Plan for Next Session:  Continue POC with progressions per patient tolerance.      See Weekly Progress Note: []  Yes  [x]  No  Next due:       CPT codes 5/17/2022 Units    Low Complexity PT evaluation 33721 71046    Moderate Complexity PT evaluation  66726    High Complexity PT evaluation 07619    PT Re-evaluation  71950    Gait training 73714    Manual therapy  53312    Therapeutic activities  50801 2   Therapeutic exercises  07819 2   Neuromuscular reeducation  01300             Electronically signed by:  Satya Hansen, PTA [id number]

## 2022-05-19 ENCOUNTER — HOSPITAL ENCOUNTER (OUTPATIENT)
Dept: PHYSICAL THERAPY | Age: 67
Setting detail: THERAPIES SERIES
Discharge: HOME | End: 2022-05-19
Payer: MEDICARE

## 2022-05-19 PROCEDURE — 97110 THERAPEUTIC EXERCISES: CPT

## 2022-05-19 PROCEDURE — 97530 THERAPEUTIC ACTIVITIES: CPT

## 2022-05-19 NOTE — PROGRESS NOTES
[address] [facility]                Phone: [phone]   Fax: [phone]      Physical Therapy  Treatment Summary       Date:  2022    Patient Name:  Trace Lockhart    :    MRN: [id number]    DIAGNOSIS:   Recurrent falls   REFERRING PHYSICIAN:  TIMBO Pugh  PHYSICAL THERAPIST:  LUCRETIA Box     ATTENDANCE:  Patient has attended 6 of 6 scheduled treatments from  3/31/22  to 22. TREATMENTS RECEIVED:  Therapeutic exercise, therapeutic activity, balance/proprioceptive activities, postural awareness/positioning training, HEP, modalities. INITIAL STATUS:  c/o ankle pain and decreased balance since falling 23   c/o pain noted across L ankle with all prolonged activities, 6/10  AROM BL LE WNL  Strength grossly 5/5 for all ranges L hip/knee, 3+/5 across L ankle for all planes  Endurance for all prolonged activiites is FAIR+/GOOD-  Gait: slow/guarded mena with decreased heel-toe progression and stance L LE due to ankle pain  Static/dynamic balance is GOOD-  TINETTI score:        FINAL STATUS:  No c/o pain noted across L ankle with all prolonged activities  AROM BL LE WNL  Strength  4+/5 across L ankle for all planes  Endurance for all prolonged activiites is  GOOD  Gait: good mena with minimal deficits noted with gait  Static/dynamic balance is GOOD  Independent with HEP      GOALS:  5 out of 5 Long Term Goals were obtained.     LONG TERM GOALS OBTAINED/NOT OBTAINED:   goal 1: decrease pain across L ankle with all prolonged activities, 0-3/10  ACHIEVED  goal 2: increase strength across L ankle to grossly 4, 4+/5 for all ranges improving static/dynamic balance to GOOD/GOOD+  ACHIEVED  goal 3: restore independent gait mechanics, heel-toe progression in  L LE during gait  ACHIEVED  goal 4: improve endurance for all prolonged activities to GOOD/GOOD+  ACHIEVED  goal 5: assure I with HEP for home management of condition  ACHIEVED    PATIENT EDUCATION/INSTRUCTIONS:  Patient instructed on and provided copy of HEP          Thank you for the opportunity to work with your patient. If you have questions or comments, please feel free to contact me by phone or fax. Electronically Signed by: Trace Davis ATC, PTA  [id number]   5/19/2022    I have read the above summary and agree with all the content. Patient is discharged from PT care at this time.    Roberto Carlos Stringer, PT, PT

## 2022-05-19 NOTE — PROGRESS NOTES
[address] [facility]                Phone: [phone]   Fax: [phone]    Physical Therapy Daily Treatment Note  Date:  5/10/2022    Patient Name:  Sherrie Barrios    :    MRN: [id number]      Evaluating therapist:  LUCRETIA Jean                    (3/31/22)   Referring Physician:  Deidre Zhong.  Diagnosis:  Recurrent falls   Restrictions/Precautions: Insurance/Certification information:  [facility] Medicare    cert dates:    to  22  ICD-10:  R29.6  Plan of care signed (Y/N):  Y  Visit# / total visits:       Pain level:  0/10       Time In:     0936  Time Out:  1035       Subjective:  Patient presents for final scheduled treatment sessions this week. Patient has no new complaints today. Exercises:  Exercise/Equipment Resistance/Repetitions Comments   StepOne   8 min L2         Rocker board 3-way 25x ea          side/side amb 8 laps  // bars 2 HR light touch   Tandem walk  Fwd/Bwd 8 laps  // bars  1 HR light touch        Foam marching 3x 1 min         sit <> stand 3 x10  green chair        Calf raises wedge 3 x10           stretch wedge 3 x15s           Step ups - fwd 2 x15 ea L/R  6\"         standing TKE with SC   2 x20  ea L/R    red flexband           Total Gym squats  BL 3 x15    L7                    Objective:    Ther. exercise/activity as listed per flow  sheet above. No modalities.     Assessment:     Patient performs exercises with good effort and pacing.          Home Exercise Program:  provided 3/31/22      Manual Treatments:  none    Modalities:  nt    Treatment/Activity Tolerance:  [x] Patient tolerated treatment well [] Patient limited by fatigue  [] Patient limited by pain  [] Patient limited by other medical complications  [] Other:     Prognosis: [] Good [] Fair  [] Poor    Patient Requires Follow-up: [] Yes  [x] No    Plan:   [] Continue per plan of care [] Alter current plan (see comments)  [] Plan of care initiated [] Hold pending MD visit [] Discharge    Plan for Next Session:      See Weekly Progress Note: [x]  Yes  []  No  Next due:       CPT codes 5/19/2022 Units    Low Complexity PT evaluation 90895 13171    Moderate Complexity PT evaluation  69043    High Complexity PT evaluation 37234    PT Re-evaluation  79906    Gait training 59659    Manual therapy  25188    Therapeutic activities  08074 2   Therapeutic exercises  52956 2   Neuromuscular reeducation  76515             Electronically signed by:  Satya Osborne, DELANEY [id number]

## 2022-05-20 RX ORDER — AMLODIPINE BESYLATE 5 MG/1
TABLET ORAL
Qty: 30 TABLET | Refills: 1 | Status: SHIPPED
Start: 2022-05-20 | End: 2022-06-16

## 2022-06-01 NOTE — PROGRESS NOTES
[facility]. Kelsie Lorenzana M.D., F.A.C.P. Rashel Hinton, REBECCA, APRN, CNS  Angeles Beltran. Vicky Fuller, MSN, APRN-FNP-C  Partha Galo MSN, APRN, FNP-C  Santa RAMIREZ, CHRIS Judge MSN, APRN, FNP-C  [address], [facility]  [place], [address] [address]  Phone: [phone]  Fax: [phone]       Samuel De Paz is a 61 y.o. female evaluated via telephone on 6/2/2022. The patient and/or health care decision maker is aware that that he/she may receive a bill for this telephone service, depending on his/her insurance coverage, and has provided verbal consent to proceed: Yes    I affirm this is a Patient Initiated Episode with an Established Patient who has not had a related appointment within my department in the past 7 days or scheduled within the next 24 hours. The patient's identity was verified at the start of the call. Others involved in call: no one    Total Time: minutes: 5-10 minutes    Note: not billable if this call serves to triage the patient into an appointment for the relevant concern    HPI:     We are following her for migraines    She remains a good historian     She is having about 5-8 migraines per month that are triggered by heat and excessive activity. She has been wearing a hat outside when it is hot which helps. She has tried Nurtec a few times will often wait until the end of the day to take it and then lie down. She feels it does help. She is still using Fiorinal as needed for severe headaches. She remains on SSRI as well. Medications failed: Amitriptyline caused sedation. Triptans are contraindicated with her use of other serotonergic medications. Her left ankle x-ray indeed showed a fracture and she has been treated. She has been in therapy and this has been improving.   She has no other complaints for me today    No chest pain or palpitations  No SOB  No vertigo, lightheadedness or loss of consciousness  No falls, tripping or stumbling  No incontinence of bowels or bladder  No itching or bruising   No numbness, tingling or focal arm/leg weakness  No speech or swallowing problems    ROS otherwise negative      Medications:     Prior to Admission medications    Medication Sig Start Date End Date Taking? Authorizing Provider   amLODIPine (NORVASC) 5 MG tablet TAKE 1 TABLET BY MOUTH EVERY DAY IN THE EVENING 5/20/22  Yes Milly Taveras MD   escitalopram (LEXAPRO) 10 MG tablet TAKE 1 TABLET BY MOUTH EVERY DAY 4/28/22  Yes Muna Dominguez MD   mirtazapine (REMERON) 7.5 MG tablet TAKE 1 TABLET BY MOUTH NIGHTLY FOR INSOMNIA 4/26/22  Yes Muna Dominguez MD   levothyroxine (SYNTHROID) 50 MCG tablet TAKE 1 TABLET BY MOUTH EVERY DAY 3/17/22  Yes Muna Dominguez MD   fluticasone (FLOVENT HFA) 110 MCG/ACT inhaler INHALE 1 PUFF BY MOUTH TWICE A DAY 3/15/22  Yes Muna Dominguez MD   Rimegepant Sulfate 75 MG TBDP Take 75 mg by mouth daily as needed (migraine) No more than 1 tab in 24 hours; lot [id number] exp 6/2024 1/24/22  Yes HERI Phillips CNP   butalbital-aspirin-caffeine [facility]) -40 MG capsule Take 1 capsule by mouth daily as needed for Headaches or Migraine (severe headache) for up to 90 days.  1/24/22 6/2/22 Yes HERI Phillips CNP   triamcinolone (KENALOG) 0.025 % cream Apply topically 2 times daily for itching associated with insect bite 10/22/21  Yes Muna Dominguez MD   albuterol sulfate  (90 Base) MCG/ACT inhaler 1INHALE 2 PUFFS BY MOUTH EVERY 6 HOURS AS NEEDED FOR SHORTNESS OF BREATH  (ok to dispense brand covered by insurance) 10/19/21  Yes Muna Dominguez MD   vitamin D ([facility] D3) 25 MCG (1000 UT) CAPS TAKE 1 CAPSULE DAILY AS DIRECTED 10/19/21  Yes Muna Dominguez MD   Multiple Vitamin (DAILY-RAVEN) TABS TAKE 1 TABLET BY MOUTH EVERY DAY 10/19/21  Yes Muna Dominguez MD   cyanocobalamin 1000 MCG/ML injection Infuse intravenously every 30 days    Yes Historical Provider, MD     Objective:     Mental Status Exam: sounds alert, oriented x4; thought processes appropriate    Speech sounds clear    Breathing Effort sounds normal    Laboratory/Radiology:     CBC with Differential:    Lab Results   Component Value Date    WBC 4.3 03/09/2022    RBC 4.98 03/09/2022    HGB 12.6 03/09/2022    HCT 39.5 03/09/2022     03/09/2022    MCV 79.3 03/09/2022    MCH 25.3 03/09/2022    MCHC 31.9 03/09/2022    RDW 14.6 03/09/2022    SEGSPCT 60 05/22/2013    LYMPHOPCT 29.3 03/09/2022    MONOPCT 7.7 03/09/2022    EOSPCT 2 02/09/2019    BASOPCT 0.5 03/09/2022    MONOSABS 0.33 03/09/2022    LYMPHSABS 1.26 03/09/2022    EOSABS 0.13 03/09/2022    BASOSABS 0.02 03/09/2022     CMP:    Lab Results   Component Value Date     03/09/2022    K 3.8 03/09/2022     03/09/2022    CO2 27 03/09/2022    BUN 7 03/09/2022    CREATININE 0.7 03/09/2022    GFRAA >60 03/09/2022    LABGLOM >60 03/09/2022    GLUCOSE 82 03/09/2022    PROT 8.4 03/09/2022    LABALBU 4.2 03/09/2022    CALCIUM 9.4 03/09/2022    BILITOT 0.4 03/09/2022    ALKPHOS 92 03/09/2022    AST 28 03/09/2022    ALT 24 03/09/2022     XR left ankle Jan 2022: Fracture involving distal tip of lateral malleolus with minimal displacement of fracture fragment     All labs and images personally reviewed today    Assessment:     Episodic migraines without aura: 5-8 HA days per month that may improve with more frequent use of abortive Nurtec rather than Fiorinal. Triptans are contraindicated as explained above. However, she may need more aggressive preventive therapy in the future.     Plan:     Continue Nurtec 75 mg PRN     Continue PRN Fiorinal--use if Nurtec ineffective    Headache diary and avoidance of headache triggers    RTO in 6 mos or sooner PRN    Britta Evelynels, APRN - CNP, AQ  8:48 AM  6/2/2022

## 2022-06-02 ENCOUNTER — SCHEDULED TELEPHONE ENCOUNTER (OUTPATIENT)
Dept: NEUROLOGY | Age: 67
End: 2022-06-02
Payer: MEDICARE

## 2022-06-02 DIAGNOSIS — G43.101 MIGRAINE WITH AURA AND WITH STATUS MIGRAINOSUS, NOT INTRACTABLE: Primary | ICD-10-CM

## 2022-06-02 PROCEDURE — 99441 PR PHYS/QHP TELEPHONE EVALUATION 5-10 MIN: CPT | Performed by: NURSE PRACTITIONER

## 2022-06-02 NOTE — PATIENT INSTRUCTIONS
schedule. Call your doctor if your symptoms do not improve after using this medicine. What happens if I overdose? Seek emergency medical attention or call the Poison Help line at [phone]. What should I avoid while taking rimegepant? Follow your doctor's instructions about any restrictions on food, beverages, oractivity. What are the possible side effects of rimegepant? Get emergency medical help if you have signs of an allergic reaction: hives, rash; difficult breathing; swelling of your face, lips, tongue, orthroat. An allergic reaction can be severe, and may occur days after you have taken rimegepant. Common side effects may include nausea. This is not a complete list of side effects and others may occur. Call your doctor for medical advice about side effects. You may report side effects toFDA at [phone]. What other drugs will affect rimegepant? Sometimes it is not safe to use certain medications at the same time. Some drugs can affect your blood levels of other drugs you take, which mayincrease side effects or make the medications less effective. If you use any of the following medicines, avoid taking rimegepant within 48hours after you last took the other medicine:   aprepitant;   erythromycin;   fluconazole; or   heart or blood pressure medicine --diltiazem, verapamil. This list is not complete and many other drugs may affect rimegepant. This includes prescription and over-the-counter medicines, vitamins, andherbal products. Not all possible drug interactions are listed here. Where can I get more information? Your pharmacist can provide more information about rimegepant. Remember, keep this and all other medicines out of the reach of children, never share your medicines with others, and use this medication only for the indication prescribed.    Every effort has been made to ensure that the information provided by [address] is accurate, up-to-date, and complete, but no guarantee is made to that effect. Drug information contained herein may be time sensitive. [employer] information has been compiled for use by healthcare practitioners and consumers in the [place] and therefore [employer] does not warrant that uses outside of the [place] are appropriate, unless specifically indicated otherwise. [facility]'s drug information does not endorse drugs, diagnose patients or recommend therapy. [facility] drug information is an informational resource designed to assist licensed healthcare practitioners in caring for their patients and/or to serve consumers viewing this service as a supplement to, and not a substitute for, the expertise, skill, knowledge and judgment of healthcare practitioners. The absence of a warning for a given drug or drug combination in no way should be construed to indicate that the drug or drug combination is safe, effective or appropriate for any given patient. [facility] does not assume any responsibility for any aspect of healthcare administered with the aid of information [facility] provides. The information contained herein is not intended to cover all possible uses, directions, precautions, warnings, drug interactions, allergic reactions, or adverse effects. If you have questions about the drugs you are taking, check with yourdoctor, nurse or pharmacist.  Copyright 3724-4202 [address]. Version: 1.01. Revision date: 3/27/2020. Care instructions adapted under license by [facility] ([facility]). If you have questions about a medical condition or this instruction, always ask your healthcare professional. [place] [address] any warranty or liability for your use of this information.

## 2022-06-02 NOTE — PROGRESS NOTES
Tory Adams was read the following message We want to confirm that, for purposes of billing, this is a virtual visit with your provider for which we will submit a claim for reimbursement with your insurance company. You will be responsible for any copays, coinsurance amounts or other amounts not covered by your insurance company. If you do not accept this, unfortunately we will not be able to schedule or proceed with a virtual visit with the provider. Do you accept? Barby responded Yes .

## 2022-06-07 ENCOUNTER — TELEPHONE (OUTPATIENT)
Dept: NEUROLOGY | Age: 67
End: 2022-06-07

## 2022-06-07 NOTE — TELEPHONE ENCOUNTER
chu rosas (Keen: [id number])  Nurtec 75MG dispersible tablets       [employer] Medicare Part D  Request Reference Number: [id number]. NURTEC TAB 75MG ODT is approved through 12/31/2022.

## 2022-06-12 DIAGNOSIS — E03.0 CONGENITAL HYPOTHYROIDISM WITH DIFFUSE GOITER: ICD-10-CM

## 2022-06-15 DIAGNOSIS — E03.0 CONGENITAL HYPOTHYROIDISM WITH DIFFUSE GOITER: ICD-10-CM

## 2022-06-15 RX ORDER — LEVOTHYROXINE SODIUM 0.05 MG/1
TABLET ORAL
Qty: 90 TABLET | Refills: 0 | Status: SHIPPED
Start: 2022-06-15 | End: 2022-08-25

## 2022-06-15 RX ORDER — LEVOTHYROXINE SODIUM 0.05 MG/1
TABLET ORAL
Qty: 90 TABLET | Refills: 0 | Status: SHIPPED
Start: 2022-06-15 | End: 2022-06-15

## 2022-06-16 RX ORDER — AMLODIPINE BESYLATE 5 MG/1
TABLET ORAL
Qty: 30 TABLET | Refills: 1 | Status: SHIPPED
Start: 2022-06-16 | End: 2022-08-08

## 2022-07-17 DIAGNOSIS — G47.00 INSOMNIA, UNSPECIFIED TYPE: ICD-10-CM

## 2022-07-19 RX ORDER — MIRTAZAPINE 7.5 MG/1
TABLET, FILM COATED ORAL
Qty: 90 TABLET | Refills: 0 | Status: SHIPPED
Start: 2022-07-19 | End: 2022-08-25

## 2022-07-20 ENCOUNTER — HOSPITAL ENCOUNTER (OUTPATIENT)
Age: 67
Discharge: HOME | End: 2022-07-20
Payer: MEDICARE

## 2022-07-20 DIAGNOSIS — I10 PRIMARY HYPERTENSION: ICD-10-CM

## 2022-07-20 LAB
ALBUMIN SERPL-MCNC: 4.2 G/DL (ref 3.5–5.2)
ALP BLD-CCNC: 97 U/L (ref 35–104)
ALT SERPL-CCNC: 15 U/L (ref 0–32)
ANION GAP SERPL CALCULATED.3IONS-SCNC: 11 MMOL/L (ref 7–16)
AST SERPL-CCNC: 18 U/L (ref 0–31)
BILIRUB SERPL-MCNC: 0.3 MG/DL (ref 0–1.2)
BUN BLDV-MCNC: 11 MG/DL (ref 6–23)
CALCIUM SERPL-MCNC: 9.1 MG/DL (ref 8.6–10.2)
CHLORIDE BLD-SCNC: 104 MMOL/L (ref 98–107)
CHOLESTEROL, TOTAL: 230 MG/DL (ref 0–199)
CO2: 27 MMOL/L (ref 22–29)
CREAT SERPL-MCNC: 1 MG/DL (ref 0.5–1)
GFR AFRICAN AMERICAN: >60
GFR NON-AFRICAN AMERICAN: >60 ML/MIN/1.73
GLUCOSE BLD-MCNC: 95 MG/DL (ref 74–99)
HDLC SERPL-MCNC: 69 MG/DL
LDL CHOLESTEROL CALCULATED: 141 MG/DL (ref 0–99)
POTASSIUM SERPL-SCNC: 3.8 MMOL/L (ref 3.5–5)
SODIUM BLD-SCNC: 142 MMOL/L (ref 132–146)
TOTAL PROTEIN: 8 G/DL (ref 6.4–8.3)
TRIGL SERPL-MCNC: 101 MG/DL (ref 0–149)
VLDLC SERPL CALC-MCNC: 20 MG/DL

## 2022-07-20 PROCEDURE — 36415 COLL VENOUS BLD VENIPUNCTURE: CPT

## 2022-07-20 PROCEDURE — 80061 LIPID PANEL: CPT

## 2022-07-20 PROCEDURE — 80053 COMPREHEN METABOLIC PANEL: CPT

## 2022-07-23 DIAGNOSIS — F32.89 OTHER DEPRESSION: ICD-10-CM

## 2022-07-23 DIAGNOSIS — G43.109 MIGRAINE WITH AURA AND WITHOUT STATUS MIGRAINOSUS, NOT INTRACTABLE: Chronic | ICD-10-CM

## 2022-07-26 RX ORDER — ESCITALOPRAM OXALATE 10 MG/1
TABLET ORAL
Qty: 90 TABLET | Refills: 0 | Status: SHIPPED
Start: 2022-07-26 | End: 2022-08-25

## 2022-07-29 ENCOUNTER — OFFICE VISIT (OUTPATIENT)
Dept: FAMILY MEDICINE CLINIC | Age: 67
End: 2022-07-29
Payer: MEDICARE

## 2022-07-29 ENCOUNTER — HOSPITAL ENCOUNTER (OUTPATIENT)
Age: 67
Discharge: HOME | End: 2022-07-29
Payer: MEDICARE

## 2022-07-29 VITALS
BODY MASS INDEX: 24.75 KG/M2 | OXYGEN SATURATION: 97 % | RESPIRATION RATE: 18 BRPM | DIASTOLIC BLOOD PRESSURE: 75 MMHG | SYSTOLIC BLOOD PRESSURE: 145 MMHG | HEART RATE: 81 BPM | HEIGHT: 64 IN | WEIGHT: 145 LBS

## 2022-07-29 DIAGNOSIS — I10 PRIMARY HYPERTENSION: ICD-10-CM

## 2022-07-29 DIAGNOSIS — I10 PRIMARY HYPERTENSION: Primary | ICD-10-CM

## 2022-07-29 DIAGNOSIS — Z65.8 PSYCHOSOCIAL STRESSORS: ICD-10-CM

## 2022-07-29 LAB
ANION GAP SERPL CALCULATED.3IONS-SCNC: 9 MMOL/L (ref 7–16)
BUN BLDV-MCNC: 10 MG/DL (ref 6–23)
CALCIUM SERPL-MCNC: 9.4 MG/DL (ref 8.6–10.2)
CHLORIDE BLD-SCNC: 106 MMOL/L (ref 98–107)
CO2: 27 MMOL/L (ref 22–29)
CREAT SERPL-MCNC: 0.8 MG/DL (ref 0.5–1)
GFR AFRICAN AMERICAN: >60
GFR NON-AFRICAN AMERICAN: >60 ML/MIN/1.73
GLUCOSE BLD-MCNC: 97 MG/DL (ref 74–99)
POTASSIUM SERPL-SCNC: 4.1 MMOL/L (ref 3.5–5)
SODIUM BLD-SCNC: 142 MMOL/L (ref 132–146)
TSH SERPL DL<=0.05 MIU/L-ACNC: 2.34 UIU/ML (ref 0.27–4.2)

## 2022-07-29 PROCEDURE — 1036F TOBACCO NON-USER: CPT | Performed by: FAMILY MEDICINE

## 2022-07-29 PROCEDURE — 36415 COLL VENOUS BLD VENIPUNCTURE: CPT

## 2022-07-29 PROCEDURE — G8420 CALC BMI NORM PARAMETERS: HCPCS | Performed by: FAMILY MEDICINE

## 2022-07-29 PROCEDURE — 3017F COLORECTAL CA SCREEN DOC REV: CPT | Performed by: FAMILY MEDICINE

## 2022-07-29 PROCEDURE — 84443 ASSAY THYROID STIM HORMONE: CPT

## 2022-07-29 PROCEDURE — 99214 OFFICE O/P EST MOD 30 MIN: CPT | Performed by: FAMILY MEDICINE

## 2022-07-29 PROCEDURE — G8427 DOCREV CUR MEDS BY ELIG CLIN: HCPCS | Performed by: FAMILY MEDICINE

## 2022-07-29 PROCEDURE — 80048 BASIC METABOLIC PNL TOTAL CA: CPT

## 2022-07-29 RX ORDER — ENALAPRIL MALEATE 2.5 MG/1
2.5 TABLET ORAL DAILY
Qty: 30 TABLET | Refills: 0 | Status: SHIPPED
Start: 2022-07-29 | End: 2022-08-24

## 2022-07-29 NOTE — PROGRESS NOTES
2022    Yury Griffiths is a 61 y.o. female here for   Chief Complaint   Patient presents with    Hypertension     Regarding hypertension. Patient is  monitoring home blood pressures. They have been in the 140's at home. Cardiovascular risk factors: dyslipidemia and hypertension. Patient does not smoke. Currently on amlodipine 5 mg daily. Taking as prescribed. No adverse effects. Today,  BP: (!) 145/75 on repeatand she is asymptomatic. BP Readings from Last 3 Encounters:   22 (!) 148/79   22 138/70   22 (!) 175/86     Patient denies chest pain, diaphoresis, dyspnea, dyspnea on exertion, peripheral edema, palpitations, headache, vision changes. Her niece  - she was 48years old. She was on drugs and had not been seen for a few weeks. Her sister said 'she quit fighting her demons\"  Just found out that her brother has cancer, who is in correction. She was told that that she would not be able to see him. He has not called. His  is trying to get him released  Was told that she cannot see him. \"if I just get to see him one time\"    The 10-year ASCVD risk score (Andrei Fagan, et al., 2013) is: 12.4%    Values used to calculate the score:      Age: 61 years      Sex: Female      Is Non- : Yes      Diabetic: No      Tobacco smoker: No      Systolic Blood Pressure: 872 mmHg      Is BP treated: Yes      HDL Cholesterol: 69 mg/dL      Total Cholesterol: 230 mg/dL      Wt Readings from Last 3 Encounters:   22 145 lb (65.8 kg)   22 140 lb (63.5 kg)   22 136 lb (61.7 kg)       She  reports that she quit smoking about 4 years ago. Her smoking use included cigarettes. She has a 0.50 pack-year smoking history. She has never used smokeless tobacco.    Medications and allergies reviewed and updated in chart.     Current Outpatient Medications   Medication Sig Dispense Refill    escitalopram (LEXAPRO) 10 MG tablet TAKE 1 TABLET BY MOUTH EVERY DAY 90 tablet 0    mirtazapine (REMERON) 7.5 MG tablet TAKE 1 TABLET BY MOUTH NIGHTLY FOR INSOMNIA 90 tablet 0    amLODIPine (NORVASC) 5 MG tablet TAKE 1 TABLET BY MOUTH EVERY DAY IN THE EVENING 30 tablet 1    levothyroxine (SYNTHROID) 50 MCG tablet TAKE 1 TABLET BY MOUTH EVERY DAY 90 tablet 0    Rimegepant Sulfate 75 MG TBDP Take 75 mg by mouth daily as needed (migraine) No more than 1 tab in 24 hours 8 tablet 11    fluticasone (FLOVENT HFA) 110 MCG/ACT inhaler INHALE 1 PUFF BY MOUTH TWICE A DAY 12 each 3    triamcinolone (KENALOG) 0.025 % cream Apply topically 2 times daily for itching associated with insect bite 15 g 0    albuterol sulfate  (90 Base) MCG/ACT inhaler 1INHALE 2 PUFFS BY MOUTH EVERY 6 HOURS AS NEEDED FOR SHORTNESS OF BREATH  (ok to dispense brand covered by insurance) 1 each 3    vitamin D ([facility]S D3) 25 MCG (1000 UT) CAPS TAKE 1 CAPSULE DAILY AS DIRECTED 90 capsule 3    Multiple Vitamin (DAILY-RAVEN) TABS TAKE 1 TABLET BY MOUTH EVERY DAY 90 tablet 3    cyanocobalamin 1000 MCG/ML injection Infuse intravenously every 30 days       butalbital-aspirin-caffeine (FIORINAL) -40 MG capsule Take 1 capsule by mouth daily as needed for Headaches or Migraine (severe headache) for up to 90 days. 30 capsule 1     No current facility-administered medications for this visit. Patient'spast medical, surgical, social and/or family history reviewed, updated in chart, and are non-contributory (unless otherwise stated). Review of Systems  Review of Systems    PE:  VS:  BP (!) 148/79   Pulse 81   Resp 18   Ht 5' 4\" (1.626 m)   Wt 145 lb (65.8 kg)   LMP 01/02/2015   SpO2 97%   BMI 24.89 kg/m²   Physical Exam  Constitutional:       Appearance: She is well-developed. HENT:      Head: Normocephalic and atraumatic. Cardiovascular:      Rate and Rhythm: Normal rate and regular rhythm. Heart sounds: No murmur heard. No friction rub. No gallop.    Pulmonary:      Effort: Pulmonary effort is normal.      Breath sounds: Normal breath sounds. No wheezing or rales. Skin:     General: Skin is warm and dry. Neurological:      General: No focal deficit present. Mental Status: She is alert and oriented to person, place, and time. Assessment/Plan:  Cheyenne Sanchez was seen today for hypertension. Diagnoses and all orders for this visit:    Primary hypertension  Not yet considered resistant but the sudden rise of blood pressure and timing is worrisome for renal artery stenosis  Check renal artery duplex  Start low dose ace  Repeat bmp in 2 weeks  Check tsh  -     Basic Metabolic Panel; Future  -     TSH; Future  -     US RETROPERITONEAL ROMAIN; Future    Psychosocial stressors  Brother with terminal cancer in group home; she is his poa but has not been allowed to see him  Consult  for [address] Referral to Social Work (Primary Care Only)    Other orders  -     enalapril (VASOTEC) 2.5 MG tablet; Take 1 tablet by mouth in the morning. Hyperlipidemia  Discussed pros/ cons of statin therapy  Will start with lifestyle changes  Reassess in 3-6 months      Return in about 2 weeks (around 8/12/2022) for non fasting labs, f/u with claudia in 1-2 months. Advised patient to call with any new medication issues. All questions answered.   Call or go to emergency department ifsymptoms worsen or persist.

## 2022-08-01 ENCOUNTER — CARE COORDINATION (OUTPATIENT)
Dept: CARE COORDINATION | Age: 67
End: 2022-08-01

## 2022-08-01 NOTE — CARE COORDINATION
[facility] placed an outreach call to both the home and mobile numbers listed for Barby. BENI with [facility] information left on both.     [facility] will follow accordingly

## 2022-08-03 ENCOUNTER — CARE COORDINATION (OUTPATIENT)
Dept: CARE COORDINATION | Age: 67
End: 2022-08-03

## 2022-08-03 NOTE — CARE COORDINATION
[facility] made a 2nd outreach attempt to [facility]. Reached voicemail only.    left with [facility] information and call back request

## 2022-08-08 RX ORDER — AMLODIPINE BESYLATE 5 MG/1
TABLET ORAL
Qty: 30 TABLET | Refills: 2 | Status: SHIPPED
Start: 2022-08-08 | End: 2022-10-19 | Stop reason: SDUPTHER

## 2022-08-09 ENCOUNTER — HOSPITAL ENCOUNTER (OUTPATIENT)
Dept: ULTRASOUND IMAGING | Age: 67
Discharge: HOME | End: 2022-08-11
Payer: MEDICARE

## 2022-08-09 DIAGNOSIS — I10 PRIMARY HYPERTENSION: ICD-10-CM

## 2022-08-09 PROCEDURE — 76770 US EXAM ABDO BACK WALL COMP: CPT | Performed by: RADIOLOGY

## 2022-08-09 PROCEDURE — 93975 VASCULAR STUDY: CPT | Performed by: RADIOLOGY

## 2022-08-09 PROCEDURE — 93975 VASCULAR STUDY: CPT

## 2022-08-09 PROCEDURE — 76770 US EXAM ABDO BACK WALL COMP: CPT

## 2022-08-22 ENCOUNTER — TELEPHONE (OUTPATIENT)
Dept: NEUROLOGY | Age: 67
End: 2022-08-22

## 2022-08-22 ENCOUNTER — TELEPHONE (OUTPATIENT)
Dept: FAMILY MEDICINE CLINIC | Age: 67
End: 2022-08-22

## 2022-08-22 NOTE — TELEPHONE ENCOUNTER
Navdeep Fraser called in to see if we can do a letter for her to get out of jury duty- please fax to [phone] her number is [phone]

## 2022-08-22 NOTE — TELEPHONE ENCOUNTER
Patient called in stating that she received a letter for jury duty and feels she cannot do it. She stated that her head started pounding. She is asking if you could do a letter for her to be sent to excuse her. Please advise.       Juror # [id number]  Fax # [phone]

## 2022-08-22 NOTE — LETTER
[address] [address]  Phone: [phone]  Fax: [phone]    Reggie Bustillos MD        August 26, 2022     Patient: Ankit Swanson   YOB: 1955   Date of Visit: 8/22/2022       To Whom It May Concern: It is my medical opinion that Ankit Swanson is unable to perform jury duty at this time due to medical conditions which would affect her ability to participate, including but not limited to history of traumatic brain injury and chronic headaches. If you have any questions or concerns, please don't hesitate to call.     Sincerely,        Reggie Bustillos MD

## 2022-08-24 DIAGNOSIS — G43.109 MIGRAINE WITH AURA AND WITHOUT STATUS MIGRAINOSUS, NOT INTRACTABLE: Chronic | ICD-10-CM

## 2022-08-24 DIAGNOSIS — E03.0 CONGENITAL HYPOTHYROIDISM WITH DIFFUSE GOITER: ICD-10-CM

## 2022-08-24 DIAGNOSIS — F32.89 OTHER DEPRESSION: ICD-10-CM

## 2022-08-24 DIAGNOSIS — G47.00 INSOMNIA, UNSPECIFIED TYPE: ICD-10-CM

## 2022-08-24 RX ORDER — ENALAPRIL MALEATE 2.5 MG/1
TABLET ORAL
Qty: 30 TABLET | Refills: 3 | Status: SHIPPED
Start: 2022-08-24 | End: 2022-10-19 | Stop reason: SDUPTHER

## 2022-08-25 RX ORDER — MIRTAZAPINE 7.5 MG/1
TABLET, FILM COATED ORAL
Qty: 90 TABLET | Refills: 0 | Status: SHIPPED | OUTPATIENT
Start: 2022-08-25

## 2022-08-25 RX ORDER — AMLODIPINE BESYLATE 5 MG/1
TABLET ORAL
Qty: 90 TABLET | OUTPATIENT
Start: 2022-08-25

## 2022-08-25 RX ORDER — ESCITALOPRAM OXALATE 10 MG/1
TABLET ORAL
Qty: 90 TABLET | Refills: 0 | Status: SHIPPED | OUTPATIENT
Start: 2022-08-25

## 2022-08-25 RX ORDER — LEVOTHYROXINE SODIUM 0.05 MG/1
TABLET ORAL
Qty: 90 TABLET | Refills: 0 | Status: SHIPPED | OUTPATIENT
Start: 2022-08-25

## 2022-10-06 ENCOUNTER — CARE COORDINATION (OUTPATIENT)
Dept: CARE COORDINATION | Age: 67
End: 2022-10-06

## 2022-10-06 NOTE — CARE COORDINATION
[facility] unable to reach [facility] on several attempts.   [facility] will sign off today

## 2022-10-17 DIAGNOSIS — J45.30 MILD PERSISTENT ASTHMA WITHOUT COMPLICATION: ICD-10-CM

## 2022-10-18 RX ORDER — FLUTICASONE PROPIONATE 110 UG/1
AEROSOL, METERED RESPIRATORY (INHALATION)
Qty: 12 EACH | Refills: 3 | Status: SHIPPED | OUTPATIENT
Start: 2022-10-18

## 2022-10-19 ENCOUNTER — OFFICE VISIT (OUTPATIENT)
Dept: FAMILY MEDICINE CLINIC | Age: 67
End: 2022-10-19
Payer: MEDICARE

## 2022-10-19 VITALS
OXYGEN SATURATION: 98 % | SYSTOLIC BLOOD PRESSURE: 132 MMHG | HEART RATE: 78 BPM | HEIGHT: 64 IN | BODY MASS INDEX: 24.59 KG/M2 | WEIGHT: 144 LBS | DIASTOLIC BLOOD PRESSURE: 84 MMHG | RESPIRATION RATE: 18 BRPM

## 2022-10-19 DIAGNOSIS — E78.2 MIXED HYPERLIPIDEMIA: ICD-10-CM

## 2022-10-19 DIAGNOSIS — Z00.00 MEDICARE ANNUAL WELLNESS VISIT, SUBSEQUENT: Primary | ICD-10-CM

## 2022-10-19 DIAGNOSIS — Z23 NEED FOR INFLUENZA VACCINATION: ICD-10-CM

## 2022-10-19 DIAGNOSIS — I10 PRIMARY HYPERTENSION: ICD-10-CM

## 2022-10-19 DIAGNOSIS — Z12.11 SCREEN FOR COLON CANCER: ICD-10-CM

## 2022-10-19 PROCEDURE — G0439 PPPS, SUBSEQ VISIT: HCPCS | Performed by: FAMILY MEDICINE

## 2022-10-19 PROCEDURE — G8427 DOCREV CUR MEDS BY ELIG CLIN: HCPCS | Performed by: FAMILY MEDICINE

## 2022-10-19 PROCEDURE — 90694 VACC AIIV4 NO PRSRV 0.5ML IM: CPT | Performed by: FAMILY MEDICINE

## 2022-10-19 PROCEDURE — 1090F PRES/ABSN URINE INCON ASSESS: CPT | Performed by: FAMILY MEDICINE

## 2022-10-19 PROCEDURE — G8420 CALC BMI NORM PARAMETERS: HCPCS | Performed by: FAMILY MEDICINE

## 2022-10-19 PROCEDURE — G8399 PT W/DXA RESULTS DOCUMENT: HCPCS | Performed by: FAMILY MEDICINE

## 2022-10-19 PROCEDURE — 99212 OFFICE O/P EST SF 10 MIN: CPT | Performed by: FAMILY MEDICINE

## 2022-10-19 PROCEDURE — G0008 ADMIN INFLUENZA VIRUS VAC: HCPCS | Performed by: FAMILY MEDICINE

## 2022-10-19 PROCEDURE — 3017F COLORECTAL CA SCREEN DOC REV: CPT | Performed by: FAMILY MEDICINE

## 2022-10-19 PROCEDURE — G8484 FLU IMMUNIZE NO ADMIN: HCPCS | Performed by: FAMILY MEDICINE

## 2022-10-19 PROCEDURE — 1036F TOBACCO NON-USER: CPT | Performed by: FAMILY MEDICINE

## 2022-10-19 PROCEDURE — 1123F ACP DISCUSS/DSCN MKR DOCD: CPT | Performed by: FAMILY MEDICINE

## 2022-10-19 RX ORDER — ENALAPRIL MALEATE 2.5 MG/1
TABLET ORAL
Qty: 30 TABLET | Refills: 3 | Status: SHIPPED | OUTPATIENT
Start: 2022-10-19

## 2022-10-19 RX ORDER — AMLODIPINE BESYLATE 5 MG/1
TABLET ORAL
Qty: 30 TABLET | Refills: 2 | Status: SHIPPED | OUTPATIENT
Start: 2022-10-19

## 2022-10-19 ASSESSMENT — PATIENT HEALTH QUESTIONNAIRE - PHQ9
2. FEELING DOWN, DEPRESSED OR HOPELESS: 1
7. TROUBLE CONCENTRATING ON THINGS, SUCH AS READING THE NEWSPAPER OR WATCHING TELEVISION: 0
SUM OF ALL RESPONSES TO PHQ QUESTIONS 1-9: 3
SUM OF ALL RESPONSES TO PHQ QUESTIONS 1-9: 3
SUM OF ALL RESPONSES TO PHQ9 QUESTIONS 1 & 2: 2
10. IF YOU CHECKED OFF ANY PROBLEMS, HOW DIFFICULT HAVE THESE PROBLEMS MADE IT FOR YOU TO DO YOUR WORK, TAKE CARE OF THINGS AT HOME, OR GET ALONG WITH OTHER PEOPLE: 1
9. THOUGHTS THAT YOU WOULD BE BETTER OFF DEAD, OR OF HURTING YOURSELF: 0
4. FEELING TIRED OR HAVING LITTLE ENERGY: 0
SUM OF ALL RESPONSES TO PHQ QUESTIONS 1-9: 3
1. LITTLE INTEREST OR PLEASURE IN DOING THINGS: 1
8. MOVING OR SPEAKING SO SLOWLY THAT OTHER PEOPLE COULD HAVE NOTICED. OR THE OPPOSITE, BEING SO FIGETY OR RESTLESS THAT YOU HAVE BEEN MOVING AROUND A LOT MORE THAN USUAL: 0
5. POOR APPETITE OR OVEREATING: 0
6. FEELING BAD ABOUT YOURSELF - OR THAT YOU ARE A FAILURE OR HAVE LET YOURSELF OR YOUR FAMILY DOWN: 1
3. TROUBLE FALLING OR STAYING ASLEEP: 0
SUM OF ALL RESPONSES TO PHQ QUESTIONS 1-9: 3

## 2022-10-19 ASSESSMENT — LIFESTYLE VARIABLES
HOW OFTEN DO YOU HAVE A DRINK CONTAINING ALCOHOL: NEVER
HOW MANY STANDARD DRINKS CONTAINING ALCOHOL DO YOU HAVE ON A TYPICAL DAY: PATIENT DOES NOT DRINK

## 2022-10-19 NOTE — PROGRESS NOTES
10/19/2022    Shawna Douglas is a 74 y.o. female here for   Chief Complaint   Patient presents with    Hypertension     Regarding hypertension. Patient is monitoring home blood pressures. Cardiovascular risk factors: advanced age (older than 54 for men, 72 for women) and hypertension. Patient does not smoke. Currently on amlodipine, enalapril . Taking as prescribed. No adverse effects. Today,  BP: 132/84 on repeatand she is asymptomatic. BP Readings from Last 3 Encounters:   10/19/22 (!) 167/95   07/29/22 (!) 145/75   04/28/22 138/70     Patient denies chest pain, diaphoresis, dyspnea, dyspnea on exertion, peripheral edema, palpitations, headache, vision changes. Regarding hyperlipidemia. This is a chronic problem. Patient is under inadequate control, as reviewed and seen on most recent labs. Is currently on no medications. .   The patient does not use medications that may worsen dyslipidemias (corticosteroids, progestins, anabolic steroids, diuretics, beta-blockers, amiodarone, cyclosporine, olanzapine). Lab Results   Component Value Date    CHOL 230 (H) 07/20/2022     Lab Results   Component Value Date    TRIG 101 07/20/2022     Lab Results   Component Value Date    HDL 69 07/20/2022     Lab Results   Component Value Date    LDLCALC 141 (H) 07/20/2022     Lab Results   Component Value Date    LABVLDL 20 07/20/2022       The 10-year ASCVD risk score (Lina SCHAFFER, et al., 2019) is: 18%    Values used to calculate the score:      Age: 74 years      Sex: Female      Is Non- : Yes      Diabetic: No      Tobacco smoker: No      Systolic Blood Pressure: 812 mmHg      Is BP treated: Yes      HDL Cholesterol: 69 mg/dL      Total Cholesterol: 230 mg/dL    Wt Readings from Last 3 Encounters:   10/19/22 144 lb (65.3 kg)   07/29/22 145 lb (65.8 kg)   04/28/22 140 lb (63.5 kg)       She  reports that she quit smoking about 4 years ago. Her smoking use included cigarettes.  She has a 0.50 pack-year smoking history. She has never used smokeless tobacco.    Medications and allergies reviewed and updated in chart. Current Outpatient Medications   Medication Sig Dispense Refill    fluticasone (FLOVENT HFA) 110 MCG/ACT inhaler TAKE 1 PUFF BY MOUTH TWICE A DAY 12 each 3    mirtazapine (REMERON) 7.5 MG tablet TAKE 1 TABLET BY MOUTH NIGHTLY FOR INSOMNIA 90 tablet 0    levothyroxine (SYNTHROID) 50 MCG tablet TAKE 1 TABLET BY MOUTH EVERY DAY 90 tablet 0    escitalopram (LEXAPRO) 10 MG tablet TAKE 1 TABLET BY MOUTH EVERY DAY 90 tablet 0    enalapril (VASOTEC) 2.5 MG tablet TAKE 1 TABLET BY MOUTH EVERY DAY IN THE MORNING 30 tablet 3    amLODIPine (NORVASC) 5 MG tablet TAKE 1 TABLET BY MOUTH EVERY DAY IN THE EVENING 30 tablet 2    Rimegepant Sulfate 75 MG TBDP Take 75 mg by mouth daily as needed (migraine) No more than 1 tab in 24 hours 8 tablet 11    triamcinolone (KENALOG) 0.025 % cream Apply topically 2 times daily for itching associated with insect bite 15 g 0    albuterol sulfate  (90 Base) MCG/ACT inhaler 1INHALE 2 PUFFS BY MOUTH EVERY 6 HOURS AS NEEDED FOR SHORTNESS OF BREATH  (ok to dispense brand covered by insurance) 1 each 3    vitamin D ([facility]S D3) 25 MCG (1000 UT) CAPS TAKE 1 CAPSULE DAILY AS DIRECTED 90 capsule 3    Multiple Vitamin (DAILY-RAVEN) TABS TAKE 1 TABLET BY MOUTH EVERY DAY 90 tablet 3    cyanocobalamin 1000 MCG/ML injection Infuse intravenously every 30 days       butalbital-aspirin-caffeine (FIORINAL) -40 MG capsule Take 1 capsule by mouth daily as needed for Headaches or Migraine (severe headache) for up to 90 days. 30 capsule 1     No current facility-administered medications for this visit. Patient'spast medical, surgical, social and/or family history reviewed, updated in chart, and are non-contributory (unless otherwise stated).       Review of Systems  Review of Systems    PE:  VS:  BP (!) 167/95   Pulse 78   Resp 18   Ht 5' 4\" (1.626 m)   Wt 144 lb (65.3 kg) LMP 01/02/2015   SpO2 98%   BMI 24.72 kg/m²   Physical Exam  Constitutional:       Appearance: She is well-developed. HENT:      Head: Normocephalic and atraumatic. Cardiovascular:      Rate and Rhythm: Normal rate and regular rhythm. Heart sounds: No murmur heard. No friction rub. No gallop. Pulmonary:      Effort: Pulmonary effort is normal.      Breath sounds: Normal breath sounds. No wheezing or rales. Skin:     General: Skin is warm and dry. Neurological:      General: No focal deficit present. Mental Status: She is alert and oriented to person, place, and time. Psychiatric:         Attention and Perception: Attention and perception normal.         Mood and Affect: Mood is depressed. Affect is blunt. Speech: Speech is delayed. Behavior: Behavior is slowed. Assessment/Plan:  Kelly Benitez was seen today for hypertension. Diagnoses and all orders for this visit:    Primary hypertension  Bp improved on repeat  Recent stressors  Cont amlodipine and enalapril   -     LIPID PANEL; Future  -     Comprehensive Metabolic Panel; Future  -     enalapril (VASOTEC) 2.5 MG tablet; TAKE 1 TABLET BY MOUTH EVERY DAY IN THE MORNING  -     amLODIPine (NORVASC) 5 MG tablet; TAKE 1 TABLET BY MOUTH EVERY DAY IN THE EVENING      Mixed hyperlipidemia  Reviewed panel  She would like to repeat labs after lifestyle changes and reconsider medication treatment options after next check if still high  -     LIPID PANEL; Future  -     Comprehensive Metabolic Panel; Future      F/u in 3 months    Advised patient to call with any new medication issues. All questions answered.   Call or go to emergency department ifsymptoms worsen or persist.

## 2022-10-19 NOTE — PATIENT INSTRUCTIONS
Personalized Preventive Plan for Guerda Muhammad - 10/19/2022  Medicare offers a range of preventive health benefits. Some of the tests and screenings are paid in full while other may be subject to a deductible, co-insurance, and/or copay. Some of these benefits include a comprehensive review of your medical history including lifestyle, illnesses that may run in your family, and various assessments and screenings as appropriate. After reviewing your medical record and screening and assessments performed today your provider may have ordered immunizations, labs, imaging, and/or referrals for you. A list of these orders (if applicable) as well as your Preventive Care list are included within your After Visit Summary for your review. Other Preventive Recommendations:    A preventive eye exam performed by an eye specialist is recommended every 1-2 years to screen for glaucoma; cataracts, macular degeneration, and other eye disorders. A preventive dental visit is recommended every 6 months. Try to get at least 150 minutes of exercise per week or 10,000 steps per day on a pedometer . Order or download the FREE \"Exercise & Physical Activity: Your Everyday Guide\" from [employer]. Call 6-503.358.1428 or search [employer] online. You need 8549-7239 mg of calcium and 3793-6554 IU of vitamin D per day. It is possible to meet your calcium requirement with diet alone, but a vitamin D supplement is usually necessary to meet this goal.  When exposed to the sun, use a sunscreen that protects against both UVA and UVB radiation with an SPF of 30 or greater. Reapply every 2 to 3 hours or after sweating, drying off with a towel, or swimming. Always wear a seat belt when traveling in a car. Always wear a helmet when riding a bicycle or motorcycle.

## 2022-10-19 NOTE — PROGRESS NOTES
Medicare Annual Wellness Visit    Christian  is here for medicare wlelness    Assessment & Plan   Need for influenza vaccination  -     Influenza, FLUAD, (age 72 y+), IM, Preservative Free, 0.5 mL  Medicare annual wellness visit, subsequent    Recommendations for Preventive Services Due: see orders and patient instructions/AVS.  Recommended screening schedule for the next 5-10 years is provided to the patient in written form: see Patient Instructions/AVS.     F/u in 3 months     Subjective   The following acute and/or chronic problems were also addressed today:  Note recent bereavement. She is feeling very tired. He had advanced cancer and she visited him almost every weekend along with her sister until he passed. She was his POA. He had been in the [facility] system. He  . She is working on his obituary  Patient's complete [address] and screening values have been reviewed and are found in [facility]. The following problems were reviewed today and where indicated follow up appointments were made and/or referrals ordered. Positive Risk Factor Screenings with Interventions:    Fall Risk:  Do you feel unsteady or are you worried about falling? : (!) yes  2 or more falls in past year?: no  Fall with injury in past year?: no   Fall Risk Interventions:    Home exercises provided to promote strength and balance  Previously completed course of PT, no falls since that time.              General Health and ACP:  General  In general, how would you say your health is?: Good  In the past 7 days, have you experienced any of the following: New or Increased Pain, New or Increased Fatigue, Loneliness, Social Isolation, Stress or Anger?: (!) Yes  Select all that apply: (!) Anger  Do you get the social and emotional support that you need?: (!) No  Do you have a Living Will?: Yes    Advance Directives       Power of  Living Will ACP-Advance Directive ACP-Power of     Not on File Not on File Not on File Not on File          General Health Risk Interventions:  Anger: patient's comments regarding reasons for stress and/or anger: recent bereavement    Health Habits/Nutrition:  Physical Activity: Sufficiently Active    Days of Exercise per Week: 5 days    Minutes of Exercise per Session: 70 min     Have you lost any weight without trying in the past 3 months?: No  Body mass index: 24.71  Have you seen the dentist within the past year?: (!) No  Health Habits/Nutrition Interventions:  Dental exam overdue:  patient encouraged to make appointment with his/her dentist             Objective   Vitals:    10/19/22 1337 10/19/22 1348   BP: (!) 167/95 132/84   Pulse: 78    Resp: 18    SpO2: 98%    Weight: 144 lb (65.3 kg)    Height: 5' 4\" (1.626 m)       Body mass index is 24.72 kg/m². Allergies   Allergen Reactions    Naproxen Other (See Comments)     Pt was very confused, had severe memory loss, and severe swelling     Prior to Visit Medications    Medication Sig Taking?  Authorizing Provider   enalapril (VASOTEC) 2.5 MG tablet TAKE 1 TABLET BY MOUTH EVERY DAY IN THE MORNING Yes Carrie Manrique MD   amLODIPine (NORVASC) 5 MG tablet TAKE 1 TABLET BY MOUTH EVERY DAY IN THE EVENING Yes Carrie Manrique MD   fluticasone (FLOVENT HFA) 110 MCG/ACT inhaler TAKE 1 PUFF BY MOUTH TWICE A DAY Yes Carrie Manrique MD   mirtazapine (REMERON) 7.5 MG tablet TAKE 1 TABLET BY MOUTH NIGHTLY FOR INSOMNIA Yes Carrie Manrique MD   levothyroxine (SYNTHROID) 50 MCG tablet TAKE 1 TABLET BY MOUTH EVERY DAY Yes Carrie Manrique MD   escitalopram (LEXAPRO) 10 MG tablet TAKE 1 TABLET BY MOUTH EVERY DAY Yes Carrie Manrique MD   Rimegepant Sulfate 75 MG TBDP Take 75 mg by mouth daily as needed (migraine) No more than 1 tab in 24 hours Yes HERI Pratt - CNP   triamcinolone (KENALOG) 0.025 % cream Apply topically 2 times daily for itching associated with insect bite Yes Carrie Manrique MD   albuterol sulfate  (90 Base) MCG/ACT inhaler 1INHALE 2 PUFFS BY MOUTH EVERY 6 HOURS AS NEEDED FOR SHORTNESS OF BREATH  (ok to dispense brand covered by insurance) Yes Vesna Ndiaye MD   vitamin D ([facility] D3) 25 MCG (1000 UT) CAPS TAKE 1 CAPSULE DAILY AS DIRECTED Yes Vesna Ndiaye MD   Multiple Vitamin (DAILY-RAVEN) TABS TAKE 1 TABLET BY MOUTH EVERY DAY Yes Vesna Ndiaye MD   cyanocobalamin 1000 MCG/ML injection Infuse intravenously every 30 days  Yes Historical Provider, MD   butalbital-aspirin-caffeine Kaylynn Zohaib) -40 MG capsule Take 1 capsule by mouth daily as needed for Headaches or Migraine (severe headache) for up to 90 days.   HERI Perez - CNP       CareTeam (Including outside providers/suppliers regularly involved in providing care):   Patient Care Team:  Vesna Ndiaye MD as PCP - Мария Beaulieu MD as PCP - [facility] Empaneled Provider  Denzel Pineda MD (Inactive) as Consulting Physician (Neurology)  Jossie Aviles MD as Consulting Physician (Internal Medicine)     Reviewed and updated this visit:  Tobacco  Allergies  Meds  Problems  Med Hx  Surg Hx  Soc Hx  Fam Hx

## 2022-11-20 DIAGNOSIS — E03.0 CONGENITAL HYPOTHYROIDISM WITH DIFFUSE GOITER: ICD-10-CM

## 2022-11-22 RX ORDER — LEVOTHYROXINE SODIUM 0.05 MG/1
TABLET ORAL
Qty: 90 TABLET | Refills: 0 | Status: SHIPPED | OUTPATIENT
Start: 2022-11-22

## 2022-12-10 DIAGNOSIS — I10 PRIMARY HYPERTENSION: ICD-10-CM

## 2022-12-10 DIAGNOSIS — G47.00 INSOMNIA, UNSPECIFIED TYPE: ICD-10-CM

## 2022-12-10 DIAGNOSIS — F32.89 OTHER DEPRESSION: ICD-10-CM

## 2022-12-10 DIAGNOSIS — E03.0 CONGENITAL HYPOTHYROIDISM WITH DIFFUSE GOITER: ICD-10-CM

## 2022-12-10 DIAGNOSIS — G43.109 MIGRAINE WITH AURA AND WITHOUT STATUS MIGRAINOSUS, NOT INTRACTABLE: Chronic | ICD-10-CM

## 2022-12-12 ENCOUNTER — OFFICE VISIT (OUTPATIENT)
Dept: NEUROLOGY | Age: 67
End: 2022-12-12
Payer: MEDICARE

## 2022-12-12 VITALS
SYSTOLIC BLOOD PRESSURE: 140 MMHG | HEIGHT: 65 IN | OXYGEN SATURATION: 98 % | TEMPERATURE: 97.6 F | DIASTOLIC BLOOD PRESSURE: 78 MMHG | HEART RATE: 66 BPM | BODY MASS INDEX: 23.99 KG/M2 | WEIGHT: 144 LBS

## 2022-12-12 DIAGNOSIS — G43.101 MIGRAINE WITH AURA AND WITH STATUS MIGRAINOSUS, NOT INTRACTABLE: Primary | ICD-10-CM

## 2022-12-12 DIAGNOSIS — Z87.820 HX OF TRAUMATIC BRAIN INJURY: ICD-10-CM

## 2022-12-12 PROBLEM — S82.66XG: Status: RESOLVED | Noted: 2022-02-01 | Resolved: 2022-12-12

## 2022-12-12 PROCEDURE — 3017F COLORECTAL CA SCREEN DOC REV: CPT | Performed by: NURSE PRACTITIONER

## 2022-12-12 PROCEDURE — G8399 PT W/DXA RESULTS DOCUMENT: HCPCS | Performed by: NURSE PRACTITIONER

## 2022-12-12 PROCEDURE — G8420 CALC BMI NORM PARAMETERS: HCPCS | Performed by: NURSE PRACTITIONER

## 2022-12-12 PROCEDURE — 1090F PRES/ABSN URINE INCON ASSESS: CPT | Performed by: NURSE PRACTITIONER

## 2022-12-12 PROCEDURE — G8484 FLU IMMUNIZE NO ADMIN: HCPCS | Performed by: NURSE PRACTITIONER

## 2022-12-12 PROCEDURE — 1036F TOBACCO NON-USER: CPT | Performed by: NURSE PRACTITIONER

## 2022-12-12 PROCEDURE — 1123F ACP DISCUSS/DSCN MKR DOCD: CPT | Performed by: NURSE PRACTITIONER

## 2022-12-12 PROCEDURE — 99214 OFFICE O/P EST MOD 30 MIN: CPT | Performed by: NURSE PRACTITIONER

## 2022-12-12 PROCEDURE — G8427 DOCREV CUR MEDS BY ELIG CLIN: HCPCS | Performed by: NURSE PRACTITIONER

## 2022-12-12 NOTE — PROGRESS NOTES
TBDP Take 75 mg by mouth daily as needed (migraine) No more than 1 tab in 24 hours 6/2/22  Yes HERI Bañuelos CNP   butalbital-aspirin-caffeine [facility]) -40 MG capsule Take 1 capsule by mouth daily as needed for Headaches or Migraine (severe headache) for up to 90 days.  1/24/22 12/12/22 Yes HERI Bañuelos CNP   triamcinolone (KENALOG) 0.025 % cream Apply topically 2 times daily for itching associated with insect bite 10/22/21  Yes Won Harrell MD   albuterol sulfate  (90 Base) MCG/ACT inhaler 1INHALE 2 PUFFS BY MOUTH EVERY 6 HOURS AS NEEDED FOR SHORTNESS OF BREATH  (ok to dispense brand covered by insurance) 10/19/21  Yes Won Harrell MD   vitamin D (CVS D3) 25 MCG (1000 UT) CAPS TAKE 1 CAPSULE DAILY AS DIRECTED 10/19/21  Yes Won Harrell MD   Multiple Vitamin (DAILY-RAVEN) TABS TAKE 1 TABLET BY MOUTH EVERY DAY 10/19/21  Yes Won Harrell MD   cyanocobalamin 1000 MCG/ML injection Infuse intravenously every 30 days    Yes Historical Provider, MD     Objective:     BP (!) 140/78   Pulse 66   Temp 97.6 °F (36.4 °C)   Ht 5' 4.75\" (1.645 m)   Wt 144 lb (65.3 kg)   LMP 01/02/2015   SpO2 98%   BMI 24.15 kg/m²     General appearance: alert, appears younger than stated age, cooperative and in no distress  Head: surgical scarring L posterior temporal/occipital area; no pericranial tenderness  Eyes: conjunctivae/corneas clear; no drainage---arcus senilis b/l  Neck: goiter noted; full ROM without cervicalgia  Lungs: clear to auscultation bilaterally  Heart: regular rate and rhythm--no murmur  Extremities: contractures R thumb and index finger; tenderness to medial malleolus on left  Pulses: 2+ and symmetric  Skin:  color, texture, turgor normal--no rashes or lesions    Mental Status: alert and oriented x 4---very pleasant as always    Appropriate attention/concentration  Intact fundus of knowledge  Memories intact    Speech: no dysarthria  Language: no aphasias    Cranial Nerves:  I: smell    II: visual acuity  Blind OS   II: visual fields Partial R HH   II: pupils R RRL; L unreactive   III,VII: ptosis None   III,IV,VI: extraocular muscles  Mild L exotropia but full ROM without nystagmus   V: mastication Normal   V: facial light touch sensation  Normal   V,VII: corneal reflex     VII: facial muscle function - upper  Normal   VII: facial muscle function - lower Normal   VIII: hearing Normal   IX: soft palate elevation  Normal   IX,X: gag reflex    XI: trapezius strength  5/5   XI: sternocleidomastoid strength 5/5   XI: neck extension strength  5/5   XII: tongue strength  Normal     Motor:  Mild R spastic hemiparesis--contracture R index and thumb  5/5 L side  Normal bulk  No drift   No abnormal movements    Sensory:  LT normal    Coordination:   Mild spastic ataxia with FN on R only    Gait:  Mild right spastic hemiparetic gait    DTR:   Right Brachioradialis reflex 3+  Left Brachioradialis reflex 3+  Right Biceps reflex 3+  Left Biceps reflex 3+  Right Quadriceps reflex 3+  Left Quadriceps reflex 3+  Right Achilles reflex 3+  Left Achilles reflex 3+  R Gross's    No other pathological reflexes    Laboratory/Radiology:     CMP:    Lab Results   Component Value Date/Time     07/29/2022 11:55 AM    K 4.1 07/29/2022 11:55 AM     07/29/2022 11:55 AM    CO2 27 07/29/2022 11:55 AM    BUN 10 07/29/2022 11:55 AM    CREATININE 0.8 07/29/2022 11:55 AM    GFRAA >60 07/29/2022 11:55 AM    LABGLOM >60 07/29/2022 11:55 AM    GLUCOSE 97 07/29/2022 11:55 AM    PROT 8.0 07/20/2022 10:10 AM    LABALBU 4.2 07/20/2022 10:10 AM    CALCIUM 9.4 07/29/2022 11:55 AM    BILITOT 0.3 07/20/2022 10:10 AM    ALKPHOS 97 07/20/2022 10:10 AM    AST 18 07/20/2022 10:10 AM    ALT 15 07/20/2022 10:10 AM     Lab Results   Component Value Date    TSH 2.340 07/29/2022     All labs and images personally reviewed today    Assessment:     Episodic migraines without aura: Handful of headaches a month, using Nurtec as abortive only. She is currently satisfied and her exam is unchanged.     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

## 2022-12-13 RX ORDER — LEVOTHYROXINE SODIUM 0.05 MG/1
TABLET ORAL
Qty: 90 TABLET | Refills: 0 | Status: SHIPPED | OUTPATIENT
Start: 2022-12-13

## 2022-12-13 RX ORDER — ESCITALOPRAM OXALATE 10 MG/1
TABLET ORAL
Qty: 90 TABLET | Refills: 0 | Status: SHIPPED | OUTPATIENT
Start: 2022-12-13

## 2022-12-13 RX ORDER — MIRTAZAPINE 7.5 MG/1
TABLET, FILM COATED ORAL
Qty: 90 TABLET | Refills: 0 | Status: SHIPPED | OUTPATIENT
Start: 2022-12-13

## 2022-12-13 RX ORDER — ENALAPRIL MALEATE 2.5 MG/1
TABLET ORAL
Qty: 90 TABLET | Refills: 1 | Status: SHIPPED | OUTPATIENT
Start: 2022-12-13

## 2022-12-13 RX ORDER — AMLODIPINE BESYLATE 5 MG/1
TABLET ORAL
Qty: 90 TABLET | Refills: 0 | Status: SHIPPED | OUTPATIENT
Start: 2022-12-13

## 2023-01-16 LAB — ANTIBODY: NORMAL

## 2023-01-26 ENCOUNTER — OFFICE VISIT (OUTPATIENT)
Dept: FAMILY MEDICINE CLINIC | Age: 68
End: 2023-01-26

## 2023-01-26 VITALS
BODY MASS INDEX: 23.39 KG/M2 | SYSTOLIC BLOOD PRESSURE: 161 MMHG | WEIGHT: 137 LBS | HEART RATE: 84 BPM | HEIGHT: 64 IN | RESPIRATION RATE: 18 BRPM | DIASTOLIC BLOOD PRESSURE: 92 MMHG | OXYGEN SATURATION: 98 %

## 2023-01-26 DIAGNOSIS — M85.89 OSTEOPENIA OF MULTIPLE SITES: ICD-10-CM

## 2023-01-26 DIAGNOSIS — Z01.419 WOMEN'S ANNUAL ROUTINE GYNECOLOGICAL EXAMINATION: Primary | ICD-10-CM

## 2023-01-26 DIAGNOSIS — G43.109 MIGRAINE WITH AURA AND WITHOUT STATUS MIGRAINOSUS, NOT INTRACTABLE: Chronic | ICD-10-CM

## 2023-01-26 DIAGNOSIS — Z12.4 ENCOUNTER FOR PAPANICOLAOU SMEAR FOR CERVICAL CANCER SCREENING: ICD-10-CM

## 2023-01-26 DIAGNOSIS — G47.00 INSOMNIA, UNSPECIFIED TYPE: ICD-10-CM

## 2023-01-26 DIAGNOSIS — E78.2 MIXED HYPERLIPIDEMIA: ICD-10-CM

## 2023-01-26 DIAGNOSIS — Z12.31 ENCOUNTER FOR SCREENING MAMMOGRAM FOR MALIGNANT NEOPLASM OF BREAST: ICD-10-CM

## 2023-01-26 DIAGNOSIS — F32.89 OTHER DEPRESSION: ICD-10-CM

## 2023-01-26 DIAGNOSIS — I10 PRIMARY HYPERTENSION: ICD-10-CM

## 2023-01-26 LAB
ALBUMIN SERPL-MCNC: 4.3 G/DL (ref 3.5–5.2)
ALP BLD-CCNC: 109 U/L (ref 35–104)
ALT SERPL-CCNC: 11 U/L (ref 0–32)
ANION GAP SERPL CALCULATED.3IONS-SCNC: 10 MMOL/L (ref 7–16)
AST SERPL-CCNC: 15 U/L (ref 0–31)
BILIRUB SERPL-MCNC: 0.3 MG/DL (ref 0–1.2)
BUN BLDV-MCNC: 8 MG/DL (ref 6–23)
CALCIUM SERPL-MCNC: 9.2 MG/DL (ref 8.6–10.2)
CHLORIDE BLD-SCNC: 105 MMOL/L (ref 98–107)
CHOLESTEROL, TOTAL: 237 MG/DL (ref 0–199)
CO2: 30 MMOL/L (ref 22–29)
CREAT SERPL-MCNC: 0.8 MG/DL (ref 0.5–1)
GFR SERPL CREATININE-BSD FRML MDRD: >60 ML/MIN/1.73
GLUCOSE BLD-MCNC: 89 MG/DL (ref 74–99)
HDLC SERPL-MCNC: 60 MG/DL
LDL CHOLESTEROL CALCULATED: 156 MG/DL (ref 0–99)
POTASSIUM SERPL-SCNC: 4.1 MMOL/L (ref 3.5–5)
SODIUM BLD-SCNC: 145 MMOL/L (ref 132–146)
TOTAL PROTEIN: 8.1 G/DL (ref 6.4–8.3)
TRIGL SERPL-MCNC: 103 MG/DL (ref 0–149)
VLDLC SERPL CALC-MCNC: 21 MG/DL

## 2023-01-26 RX ORDER — AMLODIPINE BESYLATE 5 MG/1
TABLET ORAL
Qty: 90 TABLET | Refills: 0 | Status: SHIPPED | OUTPATIENT
Start: 2023-01-26

## 2023-01-26 RX ORDER — MULTIVITAMIN WITH FOLIC ACID 400 MCG
TABLET ORAL
Qty: 90 TABLET | Refills: 3 | Status: SHIPPED | OUTPATIENT
Start: 2023-01-26

## 2023-01-26 RX ORDER — ESCITALOPRAM OXALATE 10 MG/1
TABLET ORAL
Qty: 90 TABLET | Refills: 0 | Status: SHIPPED | OUTPATIENT
Start: 2023-01-26

## 2023-01-26 RX ORDER — MIRTAZAPINE 7.5 MG/1
TABLET, FILM COATED ORAL
Qty: 90 TABLET | Refills: 0 | Status: SHIPPED | OUTPATIENT
Start: 2023-01-26

## 2023-01-26 RX ORDER — CHOLECALCIFEROL (VITAMIN D3) 25 MCG
CAPSULE ORAL
Qty: 90 CAPSULE | Refills: 3 | Status: SHIPPED | OUTPATIENT
Start: 2023-01-26

## 2023-01-26 ASSESSMENT — PATIENT HEALTH QUESTIONNAIRE - PHQ9
5. POOR APPETITE OR OVEREATING: 0
SUM OF ALL RESPONSES TO PHQ QUESTIONS 1-9: 0
2. FEELING DOWN, DEPRESSED OR HOPELESS: 0
10. IF YOU CHECKED OFF ANY PROBLEMS, HOW DIFFICULT HAVE THESE PROBLEMS MADE IT FOR YOU TO DO YOUR WORK, TAKE CARE OF THINGS AT HOME, OR GET ALONG WITH OTHER PEOPLE: 0
3. TROUBLE FALLING OR STAYING ASLEEP: 0
SUM OF ALL RESPONSES TO PHQ QUESTIONS 1-9: 0
6. FEELING BAD ABOUT YOURSELF - OR THAT YOU ARE A FAILURE OR HAVE LET YOURSELF OR YOUR FAMILY DOWN: 0
9. THOUGHTS THAT YOU WOULD BE BETTER OFF DEAD, OR OF HURTING YOURSELF: 0
SUM OF ALL RESPONSES TO PHQ9 QUESTIONS 1 & 2: 0
8. MOVING OR SPEAKING SO SLOWLY THAT OTHER PEOPLE COULD HAVE NOTICED. OR THE OPPOSITE, BEING SO FIGETY OR RESTLESS THAT YOU HAVE BEEN MOVING AROUND A LOT MORE THAN USUAL: 0
4. FEELING TIRED OR HAVING LITTLE ENERGY: 0
1. LITTLE INTEREST OR PLEASURE IN DOING THINGS: 0
SUM OF ALL RESPONSES TO PHQ QUESTIONS 1-9: 0
SUM OF ALL RESPONSES TO PHQ QUESTIONS 1-9: 0
7. TROUBLE CONCENTRATING ON THINGS, SUCH AS READING THE NEWSPAPER OR WATCHING TELEVISION: 0
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
## 2023-01-27 ENCOUNTER — TELEPHONE (OUTPATIENT)
Dept: FAMILY MEDICINE CLINIC | Age: 68
End: 2023-01-27

## 2023-01-27 RX ORDER — ROSUVASTATIN CALCIUM 10 MG/1
10 TABLET, COATED ORAL DAILY
Qty: 30 TABLET | Refills: 5 | Status: SHIPPED | OUTPATIENT
Start: 2023-01-27

## 2023-01-27 NOTE — TELEPHONE ENCOUNTER
----- Message from Veronica Saleh MA sent at 1/27/2023  1:31 PM EST -----  Pt notified and is willing to start a medication

## 2023-02-01 ENCOUNTER — TELEPHONE (OUTPATIENT)
Dept: NEUROLOGY | Age: 68
End: 2023-02-01

## 2023-02-02 ENCOUNTER — TELEPHONE (OUTPATIENT)
Dept: FAMILY MEDICINE CLINIC | Age: 68
End: 2023-02-02

## 2023-02-02 ENCOUNTER — APPOINTMENT (OUTPATIENT)
Dept: CT IMAGING | Age: 68
End: 2023-02-02
Payer: MEDICARE

## 2023-02-02 ENCOUNTER — APPOINTMENT (OUTPATIENT)
Dept: GENERAL RADIOLOGY | Age: 68
End: 2023-02-02
Payer: MEDICARE

## 2023-02-02 ENCOUNTER — HOSPITAL ENCOUNTER (EMERGENCY)
Age: 68
Discharge: HOME | End: 2023-02-03
Attending: EMERGENCY MEDICINE
Payer: MEDICARE

## 2023-02-02 DIAGNOSIS — S42.92XA CLOSED FRACTURE OF LEFT SHOULDER, INITIAL ENCOUNTER: Primary | ICD-10-CM

## 2023-02-02 PROCEDURE — 73200 CT UPPER EXTREMITY W/O DYE: CPT

## 2023-02-02 PROCEDURE — 96376 TX/PRO/DX INJ SAME DRUG ADON: CPT

## 2023-02-02 PROCEDURE — 70450 CT HEAD/BRAIN W/O DYE: CPT

## 2023-02-02 PROCEDURE — 6360000002 HC RX W HCPCS: Performed by: NURSE PRACTITIONER

## 2023-02-02 PROCEDURE — 73030 X-RAY EXAM OF SHOULDER: CPT

## 2023-02-02 PROCEDURE — 72125 CT NECK SPINE W/O DYE: CPT

## 2023-02-02 PROCEDURE — 96374 THER/PROPH/DIAG INJ IV PUSH: CPT

## 2023-02-02 PROCEDURE — 99284 EMERGENCY DEPT VISIT MOD MDM: CPT

## 2023-02-02 RX ORDER — FENTANYL CITRATE 50 UG/ML
25 INJECTION, SOLUTION INTRAMUSCULAR; INTRAVENOUS ONCE
Status: COMPLETED | OUTPATIENT
Start: 2023-02-02 | End: 2023-02-02

## 2023-02-02 RX ORDER — HYDROCODONE BITARTRATE AND ACETAMINOPHEN 5; 325 MG/1; MG/1
1 TABLET ORAL ONCE
Status: DISCONTINUED | OUTPATIENT
Start: 2023-02-02 | End: 2023-02-02

## 2023-02-02 RX ADMIN — FENTANYL CITRATE 25 MCG: 50 INJECTION, SOLUTION INTRAMUSCULAR; INTRAVENOUS at 23:06

## 2023-02-02 RX ADMIN — FENTANYL CITRATE 25 MCG: 50 INJECTION, SOLUTION INTRAMUSCULAR; INTRAVENOUS at 21:54

## 2023-02-02 ASSESSMENT — PAIN - FUNCTIONAL ASSESSMENT: PAIN_FUNCTIONAL_ASSESSMENT: 0-10

## 2023-02-02 ASSESSMENT — PAIN DESCRIPTION - LOCATION
LOCATION: SHOULDER

## 2023-02-02 ASSESSMENT — PAIN DESCRIPTION - DESCRIPTORS: DESCRIPTORS: ACHING;SHOOTING

## 2023-02-02 ASSESSMENT — LIFESTYLE VARIABLES
HOW MANY STANDARD DRINKS CONTAINING ALCOHOL DO YOU HAVE ON A TYPICAL DAY: 1 OR 2
HOW OFTEN DO YOU HAVE A DRINK CONTAINING ALCOHOL: MONTHLY OR LESS

## 2023-02-02 ASSESSMENT — PAIN DESCRIPTION - PAIN TYPE: TYPE: ACUTE PAIN

## 2023-02-02 ASSESSMENT — PAIN DESCRIPTION - ORIENTATION
ORIENTATION: LEFT

## 2023-02-02 ASSESSMENT — PAIN SCALES - GENERAL
PAINLEVEL_OUTOF10: 7
PAINLEVEL_OUTOF10: 10

## 2023-02-02 NOTE — TELEPHONE ENCOUNTER
----- Message from Kenna Hall sent at 2/2/2023  1:40 PM EST -----  Subject: Message to Provider    QUESTIONS  Information for Provider? Patient called in to let provider know she did   call to make an appt for the orders she was given but they told her to   call back in April to schedule that appt. Patient just wanted to let   provider know that she did call   ---------------------------------------------------------------------------  --------------  [id number]  [phone]; OK to leave message on voicemail  ---------------------------------------------------------------------------  --------------  SCRIPT ANSWERS  Relationship to Patient?  Self

## 2023-02-03 ENCOUNTER — TELEPHONE (OUTPATIENT)
Dept: ORTHOPEDIC SURGERY | Age: 68
End: 2023-02-03

## 2023-02-03 VITALS
HEIGHT: 64 IN | SYSTOLIC BLOOD PRESSURE: 124 MMHG | HEART RATE: 88 BPM | BODY MASS INDEX: 24.24 KG/M2 | RESPIRATION RATE: 16 BRPM | TEMPERATURE: 97.2 F | WEIGHT: 142 LBS | OXYGEN SATURATION: 97 % | DIASTOLIC BLOOD PRESSURE: 63 MMHG

## 2023-02-03 DIAGNOSIS — E78.2 MIXED HYPERLIPIDEMIA: Primary | ICD-10-CM

## 2023-02-03 RX ORDER — HYDROCODONE BITARTRATE AND ACETAMINOPHEN 5; 325 MG/1; MG/1
1 TABLET ORAL EVERY 6 HOURS PRN
Qty: 12 TABLET | Refills: 0 | Status: SHIPPED | OUTPATIENT
Start: 2023-02-03 | End: 2023-02-03 | Stop reason: SDUPTHER

## 2023-02-03 RX ORDER — HYDROCODONE BITARTRATE AND ACETAMINOPHEN 5; 325 MG/1; MG/1
1 TABLET ORAL EVERY 6 HOURS PRN
Qty: 12 TABLET | Refills: 0 | Status: SHIPPED | OUTPATIENT
Start: 2023-02-03 | End: 2023-02-06
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
## 2023-02-03 NOTE — TELEPHONE ENCOUNTER
Vm from pt requesting ed f/u appt.    Per Dr. Sands's Consult note Follow up in office in 1 weeks for repeat x-ray and further evaluation, Call office to schedule appointment.  XR left shoulder demonstrating a three-part proximal humerus fracture at the level of the surgical neck and greater tuberosity.  Comminution noted at the cervical neck.  There is also comminution of the greater tuberosity with mild displacement seen on the AP.  Similar imaging reveals shortening and displacement.    Call to pt scheduled appt   Future Appointments   Date Time Provider Department Center   2/7/2023 10:15 AM Raphael Strange DO SE Ortho [facility]   2/8/2023  8:00 AM SCHEDULE, [facility] SE [facility]   [facility]2023 10:30 [facility]   6/20/2023 10:00 AM HERI Warner - CNP [facility] NEURO Neurology -

## 2023-02-03 NOTE — ED TRIAGE NOTES
Department of Emergency Medicine  FIRST PROVIDER TRIAGE NOTE             Independent MLP           2/2/23  7:59 PM EST    Date of Encounter: 2/2/23   MRN: [id number]      HPI: Christian  is a 74 y.o. female who presents to the ED for Fall [place] off step ladder, falling on the left side, heard crack in shoulder )       ROS: Negative for cp, sob, headache, or dizziness. PE: Gen Appearance/Constitutional: alert  Musculoskeletal: limited ROM to left shoulder with swelling     Initial Plan of Care: All treatment areas with department are currently occupied. Plan to order/Initiate the following while awaiting opening in ED: imaging studies.   Initiate Treatment-Testing, Proceed toTreatment Area When Bed Available for ED Attending/MLP to Continue Care    Electronically signed by HERI Mckinney CNP   DD: 2/2/23

## 2023-02-03 NOTE — DISCHARGE INSTRUCTIONS
Orthopedics Discharge Instructions   Weight bearing Status -nonweightbearing-left upper extremity  Pain medication Per Prescription  Ice to operative/injured site for 15-30 minutes of each hour for next 3-5 days    Elevate operative/injured limb on 2 pillows at home  Fracture Care -  If your splint/cast becomes too tight, too loose, wet or damaged please contact our office right away we will need to change out the splint/cast.

## 2023-02-03 NOTE — TELEPHONE ENCOUNTER
Please let Gina Hong know that we got her message  She is scheduled for a blood pressure check here next week. We can recheck her cholesterol in 3 months or so (before her next visit)    I saw that she was just in the ED for a shoulder fracture/ fall  She should be seeing orthopedics this week. Let us know how that goes. Offer f/u appoitment next week.

## 2023-02-03 NOTE — ED NOTES
Spoke to patient and they do not carry [place] at [facility] therefore prescription read called into [facility].   Chart reviewed and PDMP reviewed     Earle Vergara PA-C  02/03/23 1549
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
## 2023-02-06 ENCOUNTER — TELEPHONE (OUTPATIENT)
Dept: FAMILY MEDICINE CLINIC | Age: 68
End: 2023-02-06

## 2023-02-06 DIAGNOSIS — S42.292D OTHER CLOSED DISPLACED FRACTURE OF PROXIMAL END OF LEFT HUMERUS WITH ROUTINE HEALING, SUBSEQUENT ENCOUNTER: Primary | ICD-10-CM

## 2023-02-06 NOTE — TELEPHONE ENCOUNTER
Patient had a bp check appointment on for Wednesday, but fell and broke her arm. She will be seeing Dr. Geoffrey Guadalupe tomorrow and will have her bp done there. Patient is asking if Dr. Vesna Lazo can order a chair (recliner) for her to sit in at home instead of the bed that she has been in.

## 2023-02-07 ENCOUNTER — TELEPHONE (OUTPATIENT)
Dept: FAMILY MEDICINE CLINIC | Age: 68
End: 2023-02-07

## 2023-02-07 ENCOUNTER — OFFICE VISIT (OUTPATIENT)
Dept: ORTHOPEDIC SURGERY | Age: 68
End: 2023-02-07
Payer: MEDICARE

## 2023-02-07 VITALS
HEIGHT: 64 IN | WEIGHT: 142 LBS | SYSTOLIC BLOOD PRESSURE: 135 MMHG | DIASTOLIC BLOOD PRESSURE: 76 MMHG | HEART RATE: 76 BPM | BODY MASS INDEX: 24.24 KG/M2

## 2023-02-07 DIAGNOSIS — S42.292A OTHER CLOSED DISPLACED FRACTURE OF PROXIMAL END OF LEFT HUMERUS, INITIAL ENCOUNTER: ICD-10-CM

## 2023-02-07 DIAGNOSIS — S42.292D OTHER CLOSED DISPLACED FRACTURE OF PROXIMAL END OF LEFT HUMERUS WITH ROUTINE HEALING, SUBSEQUENT ENCOUNTER: Primary | ICD-10-CM

## 2023-02-07 PROBLEM — S42.202A CLOSED FRACTURE OF LEFT PROXIMAL HUMERUS: Status: ACTIVE | Noted: 2023-02-07

## 2023-02-07 PROCEDURE — 23600 CLTX PROX HUMRL FX W/O MNPJ: CPT | Performed by: ORTHOPAEDIC SURGERY

## 2023-02-07 PROCEDURE — 99202 OFFICE O/P NEW SF 15 MIN: CPT | Performed by: ORTHOPAEDIC SURGERY
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
## 2023-02-07 NOTE — TELEPHONE ENCOUNTER
----- Message from Declan Godinez sent at 2/7/2023 10:31 AM EST -----  Regarding: FW: blood pressure    ----- Message -----  From: Phill Salazar RN  Sent: 2/7/2023  10:22 AM EST  To: Decaln Godinez  Subject: blood pressure                                   Hello,    Patient's blood pressure was taken in office today.     BP: 135/76  HR: 85

## 2023-02-09 ENCOUNTER — CARE COORDINATION (OUTPATIENT)
Dept: CARE COORDINATION | Age: 68
End: 2023-02-09

## 2023-02-09 NOTE — CARE COORDINATION
Initial Contact Social Work Note - Ambulatory  2/9/2023      Date of referral: 2/9/2023  Referral received from: PCP  Reason for referral: DME assistance    Previous SW referral: No  If yes, brief summary of outcome: N/A    Two Identifiers Verified: Yes    Insurance Provider: [facility] Dual and Medicaid    Support System:  Sibling(s)    [place] Status:  N/A    Community Providers: SNAP/[employer]    ADL Assistance Needed: Bathing and Dressing    Housing/Living Concerns or Home Modification Needs: N/A    Transportation Concern: Not at this time    Medication Cost Concern: No    Medication Adherence Concern: No    Financial Concern(s): No    Income (only if applicable): SSDI    Ability to Read/Write: Yes    Advance Care Plan:  N/A    Other: N/A    Identified Needs:  DME assistance/recliner chair    Social Work Plan:  [facility] to look into local DME companies regarding recliner chair  [facility] offered to make 3way call to [employer], pt did not wish to at this outreach      Method of Communication With Provider (if appropriate): Chart Routing       Goals Addressed    None      [facility] made call to pt to initiate SW referral, unable to reach, left message requesting call back. [facility] made call to pt's alternate phone number listed, unable to reach, left message requesting call back. Received missed call and message back from pt. Returned call to pt, introduced self, reviewed referral and completed assessment. Pt reports she broke her arm, and is not certain about getting a recliner chair yet, plans to talk with her DrAquiles Next week at appointment. Pt's sister lives across from her and is a home health aide and assists pt with bathing and dressing. Pt did not wish to make 3way call to her insurance at this time. [facility] to offer again at next outreach. [facility] to call local DME companies regarding recliner chair and if her insurance will be accepted. No other questions or needs reported. [facility] to f/u.     Made call to [facility] was transferred to [facility], left message requesting call back to this [facility]. Made call to [facility], they do not carry recliner chairs. Made call to [facility], they do not carry recliner chairs. [facility], unable to complete call, message comes on stating welcome to [facility] call cannot be completed. Made call to [address], they do not carry recliner chairs. Made call to [facility], who reports they only carry lift chair, would only be for someone that is steady on their feet. If pt has Waiver service though Medicaid, it would be covered although if pt only has Medicare Complete they would only cover a couple hundred dollars. [facility] made call back to pt to inform pt.     Nicolas VICK, [place]   Care Coordinator  [phone]

## 2023-02-10 NOTE — TELEPHONE ENCOUNTER
Pt can't use left side, from shoulder to waist in a lot of pain. She says she can't do much of anything. Fire department came when she fell, she was fixing a picture on a step ladder. Has to lay in middle of bed since she can't roll over very easily, has to lay on back with pillows around her. If she is on the couch she has to scooch off because she has nothing to help her pull herself up as well in the bed. She has been confined to her bedroom for the most part. That's why she wasn't the recliner.

## 2023-02-14 ENCOUNTER — TELEPHONE (OUTPATIENT)
Dept: FAMILY MEDICINE CLINIC | Age: 68
End: 2023-02-14

## 2023-02-14 NOTE — TELEPHONE ENCOUNTER
[facility] received referral for patient, but patient has not been seen by Dr. Isaiah Palacios since Oct.2022. They will need a face to face visit to be done to accept order. Patient will be seeing Dr. Lizy Marr on Feb. 16th if Dr. Isaiah Palacios wants him to make the referral for home care. They will cancel current order. Will need to send a new one after patient is seen.

## 2023-02-14 NOTE — TELEPHONE ENCOUNTER
Called [facility] and a pap does not count for a face to face, lm for Alie's office to have them make the referral art her appt on 02.16

## 2023-02-15 DIAGNOSIS — S42.292A OTHER CLOSED DISPLACED FRACTURE OF PROXIMAL END OF LEFT HUMERUS, INITIAL ENCOUNTER: Primary | ICD-10-CM

## 2023-02-16 ENCOUNTER — HOSPITAL ENCOUNTER (OUTPATIENT)
Dept: GENERAL RADIOLOGY | Age: 68
Discharge: HOME | End: 2023-02-18
Payer: MEDICARE

## 2023-02-16 ENCOUNTER — OFFICE VISIT (OUTPATIENT)
Dept: ORTHOPEDIC SURGERY | Age: 68
End: 2023-02-16
Payer: MEDICARE

## 2023-02-16 ENCOUNTER — TELEPHONE (OUTPATIENT)
Dept: FAMILY MEDICINE CLINIC | Age: 68
End: 2023-02-16

## 2023-02-16 ENCOUNTER — TELEPHONE (OUTPATIENT)
Dept: NEUROLOGY | Age: 68
End: 2023-02-16

## 2023-02-16 VITALS — HEIGHT: 64 IN | BODY MASS INDEX: 24.24 KG/M2 | WEIGHT: 142 LBS

## 2023-02-16 DIAGNOSIS — S42.292A OTHER CLOSED DISPLACED FRACTURE OF PROXIMAL END OF LEFT HUMERUS, INITIAL ENCOUNTER: ICD-10-CM

## 2023-02-16 DIAGNOSIS — S42.292D OTHER CLOSED DISPLACED FRACTURE OF PROXIMAL END OF LEFT HUMERUS WITH ROUTINE HEALING, SUBSEQUENT ENCOUNTER: Primary | ICD-10-CM

## 2023-02-16 PROCEDURE — 99213 OFFICE O/P EST LOW 20 MIN: CPT | Performed by: PHYSICIAN ASSISTANT

## 2023-02-16 PROCEDURE — 99024 POSTOP FOLLOW-UP VISIT: CPT | Performed by: PHYSICIAN ASSISTANT

## 2023-02-16 PROCEDURE — 73030 X-RAY EXAM OF SHOULDER: CPT

## 2023-02-16 RX ORDER — HYDROCODONE BITARTRATE AND ACETAMINOPHEN 5; 325 MG/1; MG/1
1 TABLET ORAL EVERY 6 HOURS PRN
Qty: 28 TABLET | Refills: 0 | Status: SHIPPED | OUTPATIENT
Start: 2023-02-16 | End: 2023-02-23

## 2023-02-16 RX ORDER — METHOCARBAMOL 750 MG/1
750 TABLET, FILM COATED ORAL 3 TIMES DAILY PRN
Qty: 42 TABLET | Refills: 0 | Status: SHIPPED
Start: 2023-02-16 | End: 2023-02-16

## 2023-02-16 RX ORDER — METHOCARBAMOL 750 MG/1
750 TABLET, FILM COATED ORAL 3 TIMES DAILY PRN
Qty: 42 TABLET | Refills: 0 | Status: SHIPPED | OUTPATIENT
Start: 2023-02-16 | End: 2023-03-02

## 2023-02-16 RX ORDER — HYDROCODONE BITARTRATE AND ACETAMINOPHEN 5; 325 MG/1; MG/1
1 TABLET ORAL EVERY 6 HOURS PRN
Qty: 28 TABLET | Refills: 0 | Status: SHIPPED
Start: 2023-02-16 | End: 2023-02-16

## 2023-02-16 NOTE — TELEPHONE ENCOUNTER
Patient called in to let you know she was seen by the orthopedic today, starting pt at home tomorrow, continue in sling, was put on Hydrocodone-Acetamin 5-325 for pain, took more x-rays things looking good as far as healing.

## 2023-02-16 NOTE — PATIENT INSTRUCTIONS
nonweightbearing on left upper extremity  Home therapy ordered today    Pain medication and muscle relaxer sent to pharmacy today    Sling for comfort  Come out of sling 2-3 times daily for motion of the elbow/wrist/hand    OK to start gentle pendulums    Plan for follow up in 2-3 weeks for repeat evaluation and xrays    Please call the office at [phone] or send [employer] message to providers sooner with any questions or concerns  Strongly recommend all of our patients sign up for [employer] in order to have direct communication VIA [employer] ROE with our clinic staff.         Future Appointments   Date Time Provider Dinesh Matamoros   3/9/2023  9:15 AM Angelita Vila DO [facility]   4/13/2023  8:00 AM SEYZ ABDU GERONIMO RM 1 SEYZ ABDU [facility]   4/13/2023  8:15 AM SEYZ ABDU DEXA SEYZ ABDU [facility]   5[facility]18[facility]2023 10:[facility]   6/20/2023 10:00 AM Dano Mar APRN - [address] [facility] -

## 2023-02-16 NOTE — TELEPHONE ENCOUNTER
Patient called in, wanted to let you know that she went back to the hospital for a check-up on her broken arm and it looks like it's healing correctly and they're going to be starting at home therapy tomorrow.

## 2023-02-17 ENCOUNTER — TELEPHONE (OUTPATIENT)
Dept: ORTHOPEDIC SURGERY | Age: 68
End: 2023-02-17

## 2023-02-17 DIAGNOSIS — S42.292D OTHER CLOSED DISPLACED FRACTURE OF PROXIMAL END OF LEFT HUMERUS WITH ROUTINE HEALING, SUBSEQUENT ENCOUNTER: Primary | ICD-10-CM

## 2023-02-17 NOTE — TELEPHONE ENCOUNTER
[id number] [facility] OT states that she was out to evaluate patient today and states that patient would benefit from outpatient PT. Lexi Holguin is requesting a referral to [facility] outpatient PT.      Future Appointments   Date Time Provider Dinesh Matamoros   3/9/2023  9:15 AM Jearl Rubinstein, DO [facility]   4/13/2023  8:00 AM SEYZ ABDU GERONIMO RM 1 SEYZ ABDU BC [facility]   4/13/2023  8:15 AM SEYZ ABDU DEXA SEYZ ABDU [facility]   5/18/2023 10:30 AM Ruth Olivares MD [facility]   6/20/2023 10:00 AM HERI Dove - CNP [facility] Neurology -     Pend and routed

## 2023-02-20 ENCOUNTER — CARE COORDINATION (OUTPATIENT)
Dept: CARE COORDINATION | Age: 68
End: 2023-02-20

## 2023-02-20 RX ORDER — ROSUVASTATIN CALCIUM 10 MG/1
TABLET, COATED ORAL
Qty: 30 TABLET | Refills: 5 | OUTPATIENT
Start: 2023-02-20

## 2023-02-20 NOTE — CARE COORDINATION
[facility] made f/u call to pt, pt reports she is agreeable to call [facility] and [address] [place] [address] regarding Waiver services d/t needing a recliner chair. Made 3way call to [address] [place] [address] they are closed today for the holiday. Made 3way call to [facility], spoke with Heladio Dow and made referral for [employer].     Kelton VICK, [place]   Care Coordinator  [phone]

## 2023-02-28 ENCOUNTER — HOSPITAL ENCOUNTER (OUTPATIENT)
Dept: PHYSICAL THERAPY | Age: 68
Setting detail: THERAPIES SERIES
Discharge: HOME | End: 2023-02-28
Payer: MEDICARE

## 2023-02-28 ENCOUNTER — CARE COORDINATION (OUTPATIENT)
Dept: CARE COORDINATION | Age: 68
End: 2023-02-28

## 2023-02-28 PROCEDURE — 97161 PT EVAL LOW COMPLEX 20 MIN: CPT
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
## 2023-02-28 NOTE — CARE COORDINATION
[facility] made f/u call to pt, unable to reach, left message requesting call back. [facility] received call back from pt, stating that [facility]  visited her and that she should be getting a recliner chair on 3/9. Pt also spoke of her rehab therapy, [facility] explaind this [facility] is uncertain about rehab therapy as this [facility] made referal to [facility] for pt to get recliner chair. No other questions or needs reported. [facility] to route note to PCP and f/u.     Jolly VICK, [facility]   Care Coordinator  [phone]

## 2023-03-02 NOTE — PROGRESS NOTES
[facility]                Phone: [phone]   Fax: [phone]    Update  Date:  3/2/2023    Patient Name:  Phillip Bernabe    :    MRN: [id number]    I spoke with Dr. Derek Cullen staff today and expressed my concerns in regards to pt having difficulty with ADL's in the home, getting into and out of bed, and also limited activity due to humeral fracture at this time. I explained to her that I would agree with Dr. Derek Cullen original orders for home PT/ OT for a few weeks until pt is cleared for more activity with LUE. Dr. Jose Castro put in new orders for home PT/ OT on 3/1/23. I also called [facility] to let them know about the situation and new orders that were placed by Dr. Jose Castro. I called pt today and let her know as well that she would be receiving a call from home health services to set up her home PT/OT evaluations and get her started on her therapy in her home. She was thankful for the assistance and states that she has been working on her L elbow ROM as we discussed a few days ago. All of pt's questions were answered on the phone today. Thank you for the opportunity to assist in the care of this very pleasant patient.         Electronically signed by:    Jose Welch PT, DPT  [id number]

## 2023-03-07 ENCOUNTER — TELEPHONE (OUTPATIENT)
Dept: ORTHOPEDIC SURGERY | Age: 68
End: 2023-03-07

## 2023-03-07 NOTE — TELEPHONE ENCOUNTER
Called and spoke with Jose J. Advised patient is okay for PROM for the shoulder and should come out of sling for motion through wrist, elbow, and shoulder. Jose J verbalized understanding, no further questions at this time.     Future Appointments   Date Time Provider Department Center   3/9/2023  9:15 AM Raphael Strange DO [facility]   4/13/2023  8:00 AM SEYZ ABDU GERONIMO RM 1 SEYZ ABDU [facility] Rad[facility]   4/13/2023  8:15 AM SEYZ ABDU DEXA SEYZ ABDU [facility]   5/18/2023 10:30 AM Christa Ayala MD [facility]   6/20/2023 10:00 AM HERI Warner - CNP [facility] NEURO Neurology -

## 2023-03-07 NOTE — TELEPHONE ENCOUNTER
Ruchi Cintron from [facility] OT called office to advise patient was admitted to [facility] AT [facility] OT.

## 2023-03-09 ENCOUNTER — HOSPITAL ENCOUNTER (OUTPATIENT)
Dept: GENERAL RADIOLOGY | Age: 68
Discharge: HOME | End: 2023-03-11
Payer: MEDICARE

## 2023-03-09 ENCOUNTER — OFFICE VISIT (OUTPATIENT)
Dept: ORTHOPEDIC SURGERY | Age: 68
End: 2023-03-09
Payer: MEDICARE

## 2023-03-09 DIAGNOSIS — S42.292D OTHER CLOSED DISPLACED FRACTURE OF PROXIMAL END OF LEFT HUMERUS WITH ROUTINE HEALING, SUBSEQUENT ENCOUNTER: Primary | ICD-10-CM

## 2023-03-09 DIAGNOSIS — S42.292D OTHER CLOSED DISPLACED FRACTURE OF PROXIMAL END OF LEFT HUMERUS WITH ROUTINE HEALING, SUBSEQUENT ENCOUNTER: ICD-10-CM

## 2023-03-09 PROCEDURE — 99212 OFFICE O/P EST SF 10 MIN: CPT | Performed by: PHYSICIAN ASSISTANT

## 2023-03-09 PROCEDURE — 73030 X-RAY EXAM OF SHOULDER: CPT

## 2023-03-09 NOTE — PATIENT INSTRUCTIONS
Partial weightbearing left upper extremity 2-3 pounds. Sling for comfort when out only. Otherwise you can come out of the sling. Continue home therapy following the proximal humerus fracture protocol. Work on pendulums, passive and active ROM of the left shoulder as tolerated. Follow-up in 4 weeks for reevaluation and x-rays. Call any questions or concerns.

## 2023-03-09 NOTE — PROGRESS NOTES
Chief Complaint   Patient presents with    Arm Injury     Left proximal humerus fracture nonop 02/02/23. Working with [facility] OT. Patient states pain improving. Requesting order for shower chair. SUBJECTIVE: Broderick Spear is a 44-year-old right-hand-dominant female who presents for follow-up for left proximal humerus fracture treated nonoperatively sustained 2-2-2023. Patient would like to avoid surgical intervention. She is now 5 weeks out from injury. She has been wearing the sling for comfort and protection. States that she just started home therapy yesterday. She has been removing the sling to work on range of motion exercises of the elbow, wrist and digits. States that she has not done very much in terms of ROM of the shoulder yet. Denies numbness, tingling or paresthesias. No other orthopedic complaints at this time. Review of Systems -   General ROS: negative for - chills, fatigue, fever or night sweats  Respiratory ROS: no cough, shortness of breath, or wheezing  Cardiovascular ROS: no chest pain or dyspnea on exertion  Gastrointestinal ROS: no abdominal pain, change in bowel habits, or black or bloody stools  Genitourinary: no hematuria, dysuria, or incontinence   Musculoskeletal ROS:see above  Neurological ROS: no TIA or stroke symptoms     OBJECTIVE:   Alert and oriented X 3, no acute distress, respirations easy and unlabored with no audible wheezes, skin warm and dry, speech and dress appropriate for noted age, affect euthymic. Extremity:  Left Upper Extremity  Skin is clean dry and intact  no edema noted  Radial pulse palpable, fingers warm with BCR  Flex/extension intact to wrist, thumb and fingers  Finger opposition intact  Finger adduction/abduction intact  Finger crossover intact  Subjectively states sensation intact to radial/medial/ulnar distribution  Actively, she can abduct and forward flex the shoulder to approximately 30 degrees.   With passive assistance, she can abduct and forward flex to approximately 45 degrees. Good motion at the elbow, wrist and digits    XR: 3/9/23   2 views left shoulder:  FINDINGS:   Mild acromioclavicular and glenohumeral DJD. Healing proximal humerus   fracture with varus angulation and anteromedial displacement. Impression   Healing displaced fracture of the proximal humerus with varus angulation. LMP 01/02/2015     ASSESSMENT:   Diagnosis Orders   1. Other closed displaced fracture of proximal end of left humerus with routine healing, subsequent encounter          PLAN:  X-rays reviewed and discussed. Again discussed both surgical and nonsurgical options. Patient would like to avoid operative intervention and continue with conservative treatment of the fracture. She understands that she will likely always have some stiffness and limited ROM in the left shoulder and is willing to accept this. Partial weightbearing left upper extremity 2-3 pounds. Sling for comfort when out only. Otherwise you can come out of the sling. Continue home therapy following the proximal humerus fracture protocol. Work on pendulums, passive and active ROM of the left shoulder as tolerated. Follow-up in 4 weeks for reevaluation and x-rays. Call any questions or concerns. Electronically signed by ZABRINA Ba on 3/9/2023 at 10:04 AM  Note: This report was completed using computerDIRAmed voiced recognition software. Every effort has been made to ensure accuracy; however, inadvertent computerized transcription errors may be present.

## 2023-03-10 RX ORDER — HYDROCODONE BITARTRATE AND ACETAMINOPHEN 5; 325 MG/1; MG/1
1 TABLET ORAL EVERY 8 HOURS PRN
Qty: 21 TABLET | Refills: 0 | Status: SHIPPED | OUTPATIENT
Start: 2023-03-10 | End: 2023-03-17

## 2023-03-12 DIAGNOSIS — E03.0 CONGENITAL HYPOTHYROIDISM WITH DIFFUSE GOITER: ICD-10-CM

## 2023-03-13 RX ORDER — ROSUVASTATIN CALCIUM 10 MG/1
TABLET, COATED ORAL
Qty: 90 TABLET | Refills: 1 | Status: SHIPPED | OUTPATIENT
Start: 2023-03-13

## 2023-03-13 RX ORDER — LEVOTHYROXINE SODIUM 0.05 MG/1
TABLET ORAL
Qty: 90 TABLET | Refills: 0 | Status: SHIPPED | OUTPATIENT
Start: 2023-03-13

## 2023-03-16 ENCOUNTER — CARE COORDINATION (OUTPATIENT)
Dept: CARE COORDINATION | Age: 68
End: 2023-03-16

## 2023-03-16 NOTE — CARE COORDINATION
[facility] made f/u call to pt, pt answered, reports she is currently in therapy, [facility] to call back at later time. [facility] made call back to pt, pt reports her 2nd therapist is there. Pt reports she was not able to get recliner chair d/t [facility] letting her know d/t her insurance, she would have to change insurance for [facility] and pt did not wish to do so. Pt did not report any questions. [facility] to sign off but encouraged pt to call this [facility] with any SW needs. [facility] to route note to PCP.     Lydia VICK, [facility]   Care Coordinator  [phone]

## 2023-03-29 ENCOUNTER — TELEPHONE (OUTPATIENT)
Dept: ORTHOPEDIC SURGERY | Age: 68
End: 2023-03-29

## 2023-03-29 DIAGNOSIS — S42.292D OTHER CLOSED DISPLACED FRACTURE OF PROXIMAL END OF LEFT HUMERUS WITH ROUTINE HEALING, SUBSEQUENT ENCOUNTER: Primary | ICD-10-CM

## 2023-03-29 NOTE — TELEPHONE ENCOUNTER
Marcelina's message states that patient has completed home health OT and would like to follow up at the [facility] outpatient office.      Future Appointments   Date Time Provider Dinesh Matamoros   4/6/2023  9:45 AM Kiersten Nuñez DO [facility]   4/13/2023  8:00 AM HAYLEY MELTON RM 1 SEYZ ABDU [facility] Radiolo   4/13/2023  8:15 AM SEYZ ABDU DEXA SEYZ ABDU [facility] Radiolo   5/18/2023 10:30 AM Dani Cooks, MD [facility]   6/20/2023 10:00 AM HERI Bunch - CNP [facility] NEURO Neurology -     Pend and routed

## 2023-03-30 NOTE — TELEPHONE ENCOUNTER
Orders signed. Please see attached. Thank you.   Electronically signed by Miles Hunter PA-C on 3/30/2023 at 2:42 PM

## 2023-04-06 ENCOUNTER — HOSPITAL ENCOUNTER (OUTPATIENT)
Dept: GENERAL RADIOLOGY | Age: 68
Discharge: HOME | End: 2023-04-08
Payer: MEDICARE

## 2023-04-06 ENCOUNTER — OFFICE VISIT (OUTPATIENT)
Dept: ORTHOPEDIC SURGERY | Age: 68
End: 2023-04-06
Payer: MEDICARE

## 2023-04-06 VITALS — BODY MASS INDEX: 24.24 KG/M2 | WEIGHT: 142 LBS | HEIGHT: 64 IN

## 2023-04-06 DIAGNOSIS — S42.292D OTHER CLOSED DISPLACED FRACTURE OF PROXIMAL END OF LEFT HUMERUS WITH ROUTINE HEALING, SUBSEQUENT ENCOUNTER: Primary | ICD-10-CM

## 2023-04-06 DIAGNOSIS — S42.292D OTHER CLOSED DISPLACED FRACTURE OF PROXIMAL END OF LEFT HUMERUS WITH ROUTINE HEALING, SUBSEQUENT ENCOUNTER: ICD-10-CM

## 2023-04-06 PROCEDURE — 73030 X-RAY EXAM OF SHOULDER: CPT

## 2023-04-06 PROCEDURE — 99212 OFFICE O/P EST SF 10 MIN: CPT

## 2023-04-06 NOTE — PROGRESS NOTES
abduct and forward flex to approximately 105 degrees. No TTP over [facility] joint or proximal humeral areas. XR: 4/6/23   2 views left shoulder demonstrate stable healing proximal humerus fracture with varus angulation. No complicating features compared to prior x-rays. Interval callus formation noted. Ht 5' 4\" (1.626 m)   Wt 142 lb (64.4 kg)   LMP 01/02/2015   BMI 24.37 kg/m²     ASSESSMENT:   Diagnosis Orders   1. Other closed displaced fracture of proximal end of left humerus with routine healing, subsequent encounter          PLAN:  X-rays reviewed and discussed. Partial weightbearing left upper extremity. Patient is set up to start outpatient therapy next week. Follow-up in 8 weeks for reevaluation and x-rays. Call if any questions or concerns. Electronically signed by ZABRINA Molina on 4/6/2023 at 10:03 AM  Note: This report was completed using [employer] voiced recognition software. Every effort has been made to ensure accuracy; however, inadvertent computerized transcription errors may be present.

## 2023-04-06 NOTE — PATIENT INSTRUCTIONS
Partial weightbearing left upper extremity. Patient is set up to start outpatient therapy next week. Follow-up in 8 weeks for reevaluation and x-rays. Call if any questions or concerns.

## 2023-04-17 ENCOUNTER — HOSPITAL ENCOUNTER (OUTPATIENT)
Dept: OCCUPATIONAL THERAPY | Age: 68
Setting detail: THERAPIES SERIES
Discharge: HOME | End: 2023-04-17
Payer: MEDICARE

## 2023-04-17 PROCEDURE — 97530 THERAPEUTIC ACTIVITIES: CPT

## 2023-04-17 PROCEDURE — 97110 THERAPEUTIC EXERCISES: CPT

## 2023-04-19 ENCOUNTER — HOSPITAL ENCOUNTER (OUTPATIENT)
Dept: OCCUPATIONAL THERAPY | Age: 68
Setting detail: THERAPIES SERIES
Discharge: HOME | End: 2023-04-19
Payer: MEDICARE

## 2023-04-19 PROCEDURE — 97530 THERAPEUTIC ACTIVITIES: CPT

## 2023-04-19 PROCEDURE — 97110 THERAPEUTIC EXERCISES: CPT

## 2023-04-19 NOTE — PROGRESS NOTES
[facility] THERAPY  DAILY TREATMENT NOTE    [facility]  [address]  [place]   Phone: [phone]   Fax: [phone]     Date:  2023    Initial Evaluation Date: 2023   Evaluating Therapist: Cherelle Novoa OT    Patient Name:  Chris Stevens    :  1955    Restrictions/Precautions:  proximal humerus fx protocol, PWB LUE, blind L eye (long standing), low fall risk  Diagnosis:  closed L proximal humerus fx of the surgical neck and greater tuberosity (non-operative)  S42.292D (ICD-10-CM) - Other displaced fracture of upper end of left humerus, subsequent encounter for fracture with routine healing  Date of Surgery/Injury: injury 23 (10 weeks out)     Insurance/Certification information:  [address] [place] [address] (no auth required)  Plan of care signed (Y[facility]  Visit# / total visits: 3 / 16- visits     Referring Practitioner:  Tonya Zhao DO (NPI #[id number])  Specific Practitioner Orders: OT Eval and Treat, range of motion, strengthening training, Activities of Daily Living retraining, Independent Activities of Daily Living retraining, adaptive equipment training due to weakness and recent loss of function     Assessment of current deficits   [x] ADLs          [x] IADLs         [x] Strength      [x] ROM          [x] Pain            [x] Sensation     [x]Fine Motor Coordination     [x] Edema         [] Scar Adhesion/Skin Integrity   [x] Motor Endurance [x] Gross Motor Coordination  [] Safety Awareness  [] Cognition   [] Balance     [] Functional mobility    [] Functional transfers        [] Vision/perception       OT PLAN OF CARE   OT POC based on physician orders, patient diagnosis and results of clinical assessment.   CPT Codes requested for additional visits: [id number], Troy Salvador, [id number], [id number], [phone], [phone], [id number]   Frequency/Duration: 2x/wk for 12-86 visits  /  Certification Period

## 2023-04-24 ENCOUNTER — HOSPITAL ENCOUNTER (OUTPATIENT)
Dept: OCCUPATIONAL THERAPY | Age: 68
Setting detail: THERAPIES SERIES
Discharge: HOME | End: 2023-04-24
Payer: MEDICARE

## 2023-04-24 PROCEDURE — 97110 THERAPEUTIC EXERCISES: CPT

## 2023-04-24 PROCEDURE — 97140 MANUAL THERAPY 1/> REGIONS: CPT

## 2023-04-24 NOTE — PROGRESS NOTES
[facility] THERAPY  DAILY TREATMENT NOTE    [facility]  [address]  [place]   Phone: [phone]   Fax: [phone]     Date:  2023    Initial Evaluation Date: 2023   Evaluating Therapist: Kathy Epps OT    Patient Name:  Connie Aguiar    :  1955    Restrictions/Precautions:  proximal humerus fx protocol, PWB LUE, blind L eye (long standing), low fall risk  Diagnosis:  closed L proximal humerus fx of the surgical neck and greater tuberosity (non-operative)  S42.292D (ICD-10-CM) - Other displaced fracture of upper end of left humerus, subsequent encounter for fracture with routine healing  Date of Surgery/Injury: injury 23 (10 weeks out)     Insurance/Certification information:  [address] [place] [address] (no auth required)  Plan of care signed (Y/N): Maureen Vidal)  Visit# / total visits: - visits     Referring Practitioner:  Lopez Hilliard DO (NPI #[id number])  Specific Practitioner Orders: OT Eval and Treat, range of motion, strengthening training, Activities of Daily Living retraining, Independent Activities of Daily Living retraining, adaptive equipment training due to weakness and recent loss of function     Assessment of current deficits   [x] ADLs          [x] IADLs         [x] Strength      [x] ROM          [x] Pain            [x] Sensation     [x]Fine Motor Coordination     [x] Edema         [] Scar Adhesion/Skin Integrity   [x] Motor Endurance [x] Gross Motor Coordination  [] Safety Awareness  [] Cognition   [] Balance     [] Functional mobility    [] Functional transfers        [] Vision/perception       OT PLAN OF CARE   OT POC based on physician orders, patient diagnosis and results of clinical assessment.   CPT Codes requested for additional visits: 67353, Troy Salvador, [id number], [id number], [phone], [phone], [id number]   Frequency/Duration: 2x/wk for 12-34 visits  /  Certification Period

## 2023-04-26 ENCOUNTER — HOSPITAL ENCOUNTER (OUTPATIENT)
Dept: OCCUPATIONAL THERAPY | Age: 68
Setting detail: THERAPIES SERIES
Discharge: HOME | End: 2023-04-26
Payer: MEDICARE

## 2023-04-26 PROCEDURE — 97140 MANUAL THERAPY 1/> REGIONS: CPT

## 2023-04-26 PROCEDURE — 97110 THERAPEUTIC EXERCISES: CPT

## 2023-04-26 NOTE — PROGRESS NOTES
[facility] THERAPY  DAILY TREATMENT NOTE    [facility]  [address]  [place]   Phone: [phone]   Fax: [phone]     Date:  2023    Initial Evaluation Date: 2023   Evaluating Therapist: Sabrina Schwarz OT    Patient Name:  Dano Duran    :  1955    Restrictions/Precautions:  proximal humerus fx protocol, PWB LUE, blind L eye (long standing), low fall risk  Diagnosis:  closed L proximal humerus fx of the surgical neck and greater tuberosity (non-operative)  S42.292D (ICD-10-CM) - Other displaced fracture of upper end of left humerus, subsequent encounter for fracture with routine healing  Date of Surgery/Injury: injury 23 (10 weeks out)     Insurance/Certification information:  [address] [place] [address] (no auth required)  Plan of care signed (Y/N): Electronic Negra Bolds)  Visit# / total visits: - visits     Referring Practitioner:  Sanchez Post DO (NPI #[id number])  Specific Practitioner Orders: OT Eval and Treat, range of motion, strengthening training, Activities of Daily Living retraining, Independent Activities of Daily Living retraining, adaptive equipment training due to weakness and recent loss of function     Assessment of current deficits   [x] ADLs          [x] IADLs         [x] Strength      [x] ROM          [x] Pain            [x] Sensation     [x]Fine Motor Coordination     [x] Edema         [] Scar Adhesion/Skin Integrity   [x] Motor Endurance [x] Gross Motor Coordination  [] Safety Awareness  [] Cognition   [] Balance     [] Functional mobility    [] Functional transfers        [] Vision/perception       OT PLAN OF CARE   OT POC based on physician orders, patient diagnosis and results of clinical assessment.   CPT Codes requested for additional visits: 27596, Troy Salvador, [id number], [id number], [phone], [phone], [id number]   Frequency/Duration: 2x/wk for 77-85 visits  /  Certification Period

## 2023-04-29 DIAGNOSIS — I10 PRIMARY HYPERTENSION: ICD-10-CM

## 2023-05-01 ENCOUNTER — HOSPITAL ENCOUNTER (OUTPATIENT)
Dept: OCCUPATIONAL THERAPY | Age: 68
Setting detail: THERAPIES SERIES
Discharge: HOME | End: 2023-05-01
Payer: MEDICARE

## 2023-05-01 PROCEDURE — 97530 THERAPEUTIC ACTIVITIES: CPT

## 2023-05-01 PROCEDURE — 97140 MANUAL THERAPY 1/> REGIONS: CPT

## 2023-05-01 PROCEDURE — 97110 THERAPEUTIC EXERCISES: CPT

## 2023-05-01 NOTE — PROGRESS NOTES
[facility] THERAPY  DAILY TREATMENT NOTE    [facility]  [address]  [place]   Phone: [phone]   Fax: [phone]     Date:  2023    Initial Evaluation Date: 2023   Evaluating Therapist: Ginny Ruvalcaba OT    Patient Name:  Lauren Goldberg    :  1955    Restrictions/Precautions:  proximal humerus fx protocol, PWB LUE, blind L eye (long standing), low fall risk  Diagnosis:  closed L proximal humerus fx of the surgical neck and greater tuberosity (non-operative)  S42.292D (ICD-10-CM) - Other displaced fracture of upper end of left humerus, subsequent encounter for fracture with routine healing  Date of Surgery/Injury: injury 23 (10 weeks out)     Insurance/Certification information:  [address] [place] [address] (no auth required)  Plan of care signed (Y/N): Maureen Mason)  Visit# / total visits: - visits  Update:      Referring Practitioner:  Awilda Zelaya DO (NPI #[id number])  Specific Practitioner Orders: OT Eval and Treat, range of motion, strengthening training, Activities of Daily Living retraining, Independent Activities of Daily Living retraining, adaptive equipment training due to weakness and recent loss of function     Assessment of current deficits   [x] ADLs          [x] IADLs         [x] Strength      [x] ROM          [x] Pain            [x] Sensation     [x]Fine Motor Coordination     [x] Edema         [] Scar Adhesion/Skin Integrity   [x] Motor Endurance [x] Gross Motor Coordination  [] Safety Awareness  [] Cognition   [] Balance     [] Functional mobility    [] Functional transfers        [] Vision/perception       OT PLAN OF CARE   OT POC based on physician orders, patient diagnosis and results of clinical assessment.   CPT Codes requested for additional visits: [id number], Troy Salvador, [id number], [phone], [phone], [id number], [id number]   Frequency/Duration: 2x/wk for 50-74 visits  /  Certification

## 2023-05-02 RX ORDER — ENALAPRIL MALEATE 2.5 MG/1
TABLET ORAL
Qty: 90 TABLET | Refills: 1 | Status: SHIPPED | OUTPATIENT
Start: 2023-05-02

## 2023-05-03 ENCOUNTER — HOSPITAL ENCOUNTER (OUTPATIENT)
Dept: OCCUPATIONAL THERAPY | Age: 68
Setting detail: THERAPIES SERIES
Discharge: HOME | End: 2023-05-03
Payer: MEDICARE

## 2023-05-03 PROCEDURE — 97110 THERAPEUTIC EXERCISES: CPT

## 2023-05-03 PROCEDURE — 97530 THERAPEUTIC ACTIVITIES: CPT

## 2023-05-03 NOTE — PROGRESS NOTES
MRMT test by at least 5-10 seconds. 7) Patient will be knowledgeable of edema control techniques as evident with decreases from moderate to slightnone  8) Patient to report decreased pain in LUE from 5/10 to 2/10 or less with resistive functional use and sensitivity from moderate to slight. TODAY'S TREATMENT   Pain Level: mild, aching, shooting, tight (pulling), and uncomfortable    SUBJECTIVE: Patient seen 1  scheduled visits. \"I work it all the time, but still weak. \"     OBJECTIVE:  Week 12+: Engaged patient in the following with focus on ROM, pain, HEP, adaptive equipment/comp tech training, Strengthening, FM/GM, edema control. Reassessment at or by 5/12/22. INTERVENTION: DONE  SPECIFICS/COMMENTS:   Modality:     Fluidotherapy      UltraSound     E-Stim     PARAFFIN     Scar Mass/Edema Control:               PROM/Stretching:     Shoulder  PROM into forward flexion and then horiz abd 8 min    Shoulder  Joint mob all planes    AROM/AAROM:     Shoulder x Pulley for scapular retraction, forward flexion 8 min  Horiz abd/add end range holds   Shoulder x Scapation on wall 10 reps  yellow band added to HEP   Shoulder    Supine sidelying up and down ladder 10 x 3  Supine sidelying wrist wt circles in horz abd 10 x 3   Shoulder x Pendulums using trigger point hold added to HEP   Shoulder x Wall stretch 3 way 10 reps  added to HEP   Therapeutic Activity:     Shoulder arc  Level 2 table top 3 sets   Resistive pinch pins x Seated to add to franki, seated to remove 2# pins use 1# wrist wt   UBE x To improve motor control/coordination and motor endurance of affected UE, scapular mobility/stability challenge with speed/resistance change ups as increased function builds and improvements noted.  7 min        Strengthening:     Forearm x Red med flexbar 30 reps each direction, 10 reps green med heavy   Biceps/tri x 3# free wts 10 x 3 hammer, rotation,    shoulder x Seated: against gravity forward flexion 10 x 3   Other:

## 2023-05-08 ENCOUNTER — HOSPITAL ENCOUNTER (OUTPATIENT)
Dept: OCCUPATIONAL THERAPY | Age: 68
Setting detail: THERAPIES SERIES
Discharge: HOME | End: 2023-05-08
Payer: MEDICARE

## 2023-05-08 PROCEDURE — 97110 THERAPEUTIC EXERCISES: CPT

## 2023-05-08 PROCEDURE — 97530 THERAPEUTIC ACTIVITIES: CPT

## 2023-05-08 NOTE — PROGRESS NOTES
[facility] THERAPY  DAILY TREATMENT NOTE    [facility]  [address]  [place]   Phone: [phone]   Fax: [phone]     Date:  2023    Initial Evaluation Date: 2023   Evaluating Therapist: Harish George OT    Patient Name:  Ayaka Hernandez    :  1955    Restrictions/Precautions:  proximal humerus fx protocol, PWB LUE, blind L eye (long standing), low fall risk  Diagnosis:  closed L proximal humerus fx of the surgical neck and greater tuberosity (non-operative)  S42.292D (ICD-10-CM) - Other displaced fracture of upper end of left humerus, subsequent encounter for fracture with routine healing  Date of Surgery/Injury: injury 23 (10 weeks out)     Insurance/Certification information:  [address] [place] [address] (no auth required)  Plan of care signed (Y/N): Maureen Larry)  Visit# / total visits: - visits  Update:      Referring Practitioner:  Selena Flood DO (NPI #[id number])  Specific Practitioner Orders: OT Eval and Treat, range of motion, strengthening training, Activities of Daily Living retraining, Independent Activities of Daily Living retraining, adaptive equipment training due to weakness and recent loss of function     Assessment of current deficits   [x] ADLs          [x] IADLs         [x] Strength      [x] ROM          [x] Pain            [x] Sensation     [x]Fine Motor Coordination     [x] Edema         [] Scar Adhesion/Skin Integrity   [x] Motor Endurance [x] Gross Motor Coordination  [] Safety Awareness  [] Cognition   [] Balance     [] Functional mobility    [] Functional transfers        [] Vision/perception       OT PLAN OF CARE   OT POC based on physician orders, patient diagnosis and results of clinical assessment.   CPT Codes requested for additional visits: 02535, Troy Salvador, [id number], [phone], [phone], [phone], [id number]   Frequency/Duration: 2x/wk for 31-21 visits  /  Certification

## 2023-05-10 ENCOUNTER — HOSPITAL ENCOUNTER (OUTPATIENT)
Dept: OCCUPATIONAL THERAPY | Age: 68
Setting detail: THERAPIES SERIES
Discharge: HOME | End: 2023-05-10
Payer: MEDICARE

## 2023-05-10 PROCEDURE — 97530 THERAPEUTIC ACTIVITIES: CPT

## 2023-05-10 PROCEDURE — 97110 THERAPEUTIC EXERCISES: CPT

## 2023-05-10 NOTE — PROGRESS NOTES
[facility] THERAPY  DAILY TREATMENT NOTE    PROGRESS NOTE    [facility]  [address]  [place]   Phone: [phone]   Fax: [phone]     Date:  5/10/2023    Initial Evaluation Date: 2023   Evaluating Therapist: Judy Stratton OT    Patient Name:  Haydee Waggoner    :  1955    Restrictions/Precautions:  proximal humerus fx protocol, PWB LUE, blind L eye (long standing), low fall risk  Diagnosis:  closed L proximal humerus fx of the surgical neck and greater tuberosity (non-operative)  S42.292D (ICD-10-CM) - Other displaced fracture of upper end of left humerus, subsequent encounter for fracture with routine healing  Date of Surgery/Injury: injury 23 (10 weeks out)  Referring Practitioner:  Amy Saleh DO (NPI #[id number])  Specific Practitioner Orders: OT Eval and Treat, range of motion, strengthening training, Activities of Daily Living retraining, Independent Activities of Daily Living retraining, adaptive equipment training due to weakness and recent loss of function     Insurance/Certification information:  [address] [place] [address] (no auth required)  Plan of care signed (Y/N): Maureen Parry)  Visit# / total visits: - visits  Update:  5/10/23,      Assessment of current deficits   [x] ADLs          [x] IADLs         [x] Strength      [x] ROM          [x] Pain            [x] Sensation     [x]Fine Motor Coordination     [x] Edema         [] Scar Adhesion/Skin Integrity   [x] Motor Endurance [x] Gross Motor Coordination  [] Safety Awareness  [] Cognition   [] Balance     [] Functional mobility    [] Functional transfers        [] Vision/perception       OT PLAN OF CARE   OT POC based on physician orders, patient diagnosis and results of clinical assessment.   CPT Codes requested for additional visits: 04395, Troy Salvador, [id number], [id number], [phone], [phone], [id number]   Frequency/Duration: 2x/wk for 16-18

## 2023-05-11 DIAGNOSIS — J45.30 MILD PERSISTENT ASTHMA WITHOUT COMPLICATION: ICD-10-CM

## 2023-05-11 DIAGNOSIS — F32.89 OTHER DEPRESSION: ICD-10-CM

## 2023-05-11 DIAGNOSIS — G43.109 MIGRAINE WITH AURA AND WITHOUT STATUS MIGRAINOSUS, NOT INTRACTABLE: Chronic | ICD-10-CM

## 2023-05-11 DIAGNOSIS — E03.0 CONGENITAL HYPOTHYROIDISM WITH DIFFUSE GOITER: ICD-10-CM

## 2023-05-11 DIAGNOSIS — I10 PRIMARY HYPERTENSION: ICD-10-CM

## 2023-05-11 DIAGNOSIS — G47.00 INSOMNIA, UNSPECIFIED TYPE: ICD-10-CM

## 2023-05-12 RX ORDER — ESCITALOPRAM OXALATE 10 MG/1
TABLET ORAL
Qty: 90 TABLET | Refills: 0 | Status: SHIPPED | OUTPATIENT
Start: 2023-05-12

## 2023-05-12 RX ORDER — FLUTICASONE PROPIONATE 110 UG/1
AEROSOL, METERED RESPIRATORY (INHALATION)
Qty: 12 EACH | Refills: 3 | Status: SHIPPED | OUTPATIENT
Start: 2023-05-12

## 2023-05-12 RX ORDER — MIRTAZAPINE 7.5 MG/1
TABLET, FILM COATED ORAL
Qty: 90 TABLET | Refills: 0 | Status: SHIPPED | OUTPATIENT
Start: 2023-05-12

## 2023-05-12 RX ORDER — LEVOTHYROXINE SODIUM 0.05 MG/1
TABLET ORAL
Qty: 90 TABLET | Refills: 0 | Status: SHIPPED | OUTPATIENT
Start: 2023-05-12

## 2023-05-12 RX ORDER — AMLODIPINE BESYLATE 5 MG/1
TABLET ORAL
Qty: 90 TABLET | Refills: 0 | Status: SHIPPED | OUTPATIENT
Start: 2023-05-12

## 2023-05-15 ENCOUNTER — APPOINTMENT (OUTPATIENT)
Dept: OCCUPATIONAL THERAPY | Age: 68
End: 2023-05-15
Payer: MEDICARE

## 2023-05-17 ENCOUNTER — HOSPITAL ENCOUNTER (OUTPATIENT)
Dept: OCCUPATIONAL THERAPY | Age: 68
Setting detail: THERAPIES SERIES
Discharge: HOME | End: 2023-05-17
Payer: MEDICARE

## 2023-05-17 PROCEDURE — 97530 THERAPEUTIC ACTIVITIES: CPT

## 2023-05-17 PROCEDURE — 97110 THERAPEUTIC EXERCISES: CPT

## 2023-05-18 ENCOUNTER — OFFICE VISIT (OUTPATIENT)
Dept: FAMILY MEDICINE CLINIC | Age: 68
End: 2023-05-18
Payer: MEDICARE

## 2023-05-18 VITALS
BODY MASS INDEX: 22.53 KG/M2 | SYSTOLIC BLOOD PRESSURE: 125 MMHG | HEIGHT: 64 IN | DIASTOLIC BLOOD PRESSURE: 73 MMHG | HEART RATE: 75 BPM | WEIGHT: 132 LBS

## 2023-05-18 DIAGNOSIS — D56.0 ALPHA-THALASSEMIA (HCC): ICD-10-CM

## 2023-05-18 DIAGNOSIS — I10 PRIMARY HYPERTENSION: ICD-10-CM

## 2023-05-18 DIAGNOSIS — R63.4 WEIGHT LOSS: Primary | ICD-10-CM

## 2023-05-18 DIAGNOSIS — E03.0 CONGENITAL HYPOTHYROIDISM WITH DIFFUSE GOITER: ICD-10-CM

## 2023-05-18 DIAGNOSIS — R63.4 WEIGHT LOSS: ICD-10-CM

## 2023-05-18 DIAGNOSIS — J45.30 MILD PERSISTENT ASTHMA WITHOUT COMPLICATION: ICD-10-CM

## 2023-05-18 LAB
ALBUMIN SERPL-MCNC: 4.4 G/DL (ref 3.5–5.2)
ALP SERPL-CCNC: 107 U/L (ref 35–104)
ALT SERPL-CCNC: 8 U/L (ref 0–32)
ANION GAP SERPL CALCULATED.3IONS-SCNC: 14 MMOL/L (ref 7–16)
AST SERPL-CCNC: 13 U/L (ref 0–31)
BASOPHILS # BLD: 0.04 E9/L (ref 0–0.2)
BASOPHILS NFR BLD: 0.9 % (ref 0–2)
BILIRUB SERPL-MCNC: 0.3 MG/DL (ref 0–1.2)
BUN SERPL-MCNC: 14 MG/DL (ref 6–23)
CALCIUM SERPL-MCNC: 9.7 MG/DL (ref 8.6–10.2)
CHLORIDE SERPL-SCNC: 107 MMOL/L (ref 98–107)
CO2 SERPL-SCNC: 25 MMOL/L (ref 22–29)
CREAT SERPL-MCNC: 0.7 MG/DL (ref 0.5–1)
EOSINOPHIL # BLD: 0.17 E9/L (ref 0.05–0.5)
EOSINOPHIL NFR BLD: 3.8 % (ref 0–6)
ERYTHROCYTE [DISTWIDTH] IN BLOOD BY AUTOMATED COUNT: 15.1 FL (ref 11.5–15)
GLUCOSE SERPL-MCNC: 90 MG/DL (ref 74–99)
HCT VFR BLD AUTO: 44.6 % (ref 34–48)
HGB BLD-MCNC: 13.7 G/DL (ref 11.5–15.5)
IMM GRANULOCYTES # BLD: 0.01 E9/L
IMM GRANULOCYTES NFR BLD: 0.2 % (ref 0–5)
LYMPHOCYTES # BLD: 1.48 E9/L (ref 1.5–4)
LYMPHOCYTES NFR BLD: 33.2 % (ref 20–42)
MCH RBC QN AUTO: 25.5 PG (ref 26–35)
MCHC RBC AUTO-ENTMCNC: 30.7 % (ref 32–34.5)
MCV RBC AUTO: 82.9 FL (ref 80–99.9)
MONOCYTES # BLD: 0.38 E9/L (ref 0.1–0.95)
MONOCYTES NFR BLD: 8.5 % (ref 2–12)
NEUTROPHILS # BLD: 2.38 E9/L (ref 1.8–7.3)
NEUTS SEG NFR BLD: 53.4 % (ref 43–80)
PLATELET # BLD AUTO: 293 E9/L (ref 130–450)
PMV BLD AUTO: 10.5 FL (ref 7–12)
POTASSIUM SERPL-SCNC: 4.6 MMOL/L (ref 3.5–5)
PROT SERPL-MCNC: 8.2 G/DL (ref 6.4–8.3)
RBC # BLD AUTO: 5.38 E12/L (ref 3.5–5.5)
SODIUM SERPL-SCNC: 146 MMOL/L (ref 132–146)
TSH SERPL-MCNC: 9.51 UIU/ML (ref 0.27–4.2)
WBC # BLD: 4.5 E9/L (ref 4.5–11.5)

## 2023-05-18 PROCEDURE — 3017F COLORECTAL CA SCREEN DOC REV: CPT | Performed by: FAMILY MEDICINE

## 2023-05-18 PROCEDURE — 1123F ACP DISCUSS/DSCN MKR DOCD: CPT | Performed by: FAMILY MEDICINE

## 2023-05-18 PROCEDURE — 3078F DIAST BP <80 MM HG: CPT | Performed by: FAMILY MEDICINE

## 2023-05-18 PROCEDURE — G8427 DOCREV CUR MEDS BY ELIG CLIN: HCPCS | Performed by: FAMILY MEDICINE

## 2023-05-18 PROCEDURE — G8399 PT W/DXA RESULTS DOCUMENT: HCPCS | Performed by: FAMILY MEDICINE

## 2023-05-18 PROCEDURE — G8420 CALC BMI NORM PARAMETERS: HCPCS | Performed by: FAMILY MEDICINE

## 2023-05-18 PROCEDURE — 3074F SYST BP LT 130 MM HG: CPT | Performed by: FAMILY MEDICINE

## 2023-05-18 PROCEDURE — 1090F PRES/ABSN URINE INCON ASSESS: CPT | Performed by: FAMILY MEDICINE

## 2023-05-18 PROCEDURE — 99214 OFFICE O/P EST MOD 30 MIN: CPT | Performed by: FAMILY MEDICINE

## 2023-05-18 PROCEDURE — 1036F TOBACCO NON-USER: CPT | Performed by: FAMILY MEDICINE

## 2023-05-18 ASSESSMENT — ENCOUNTER SYMPTOMS
COUGH: 0
WHEEZING: 0

## 2023-05-18 NOTE — PROGRESS NOTES
5/18/2023    Amy Fish is a 74 y.o. female here for   Chief Complaint   Patient presents with    Hypertension     Recovering from fracture - left humerus fracture    Reports from ortho is that it is healing  She continues to work with therapist and is making gains daily. She had a period fo time when she was unable to use her stove/ prepare food. She also had poor appetite for a long time. Regarding hypertension. Patient is  monitoring home blood pressures. Cardiovascular risk factors: advanced age (older than 54 for men, 72 for women), hypertension, and temporarily more sedentary lifestyle. Patient does not smoke. Currently on enalapril 2.5 mg daily, rosuvastatin 10 mg daily. Taking as prescribed. No adverse effects. Today,  BP: 125/73 and she is asymptomatic. BP Readings from Last 3 Encounters:   05/18/23 125/73   02/07/23 135/76   02/03/23 124/63     Patient denies chest pain, diaphoresis, dyspnea, dyspnea on exertion, peripheral edema, palpitations, headache, vision changes. Note weight loss since last visit. Wt Readings from Last 3 Encounters:   05/18/23 132 lb (59.9 kg)   04/06/23 142 lb (64.4 kg)   02/16/23 142 lb (64.4 kg)       She  reports that she quit smoking about 5 years ago. Her smoking use included cigarettes. She has a 0.50 pack-year smoking history. She has never used smokeless tobacco.    Medications and allergies reviewed and updated in chart.     Current Outpatient Medications   Medication Sig Dispense Refill    mirtazapine (REMERON) 7.5 MG tablet TAKE 1 TABLET BY MOUTH NIGHTLY FOR INSOMNIA 90 tablet 0    fluticasone (FLOVENT HFA) 110 MCG/ACT inhaler INHALE 1 PUFF BY MOUTH TWICE A DAY 12 each 3    amLODIPine (NORVASC) 5 MG tablet TAKE 1 TABLET BY MOUTH EVERY DAY IN THE EVENING 90 tablet 0    levothyroxine (SYNTHROID) 50 MCG tablet TAKE 1 TABLET BY MOUTH EVERY DAY 90 tablet 0    escitalopram (LEXAPRO) 10 MG tablet TAKE 1 TABLET BY MOUTH EVERY DAY 90 tablet 0    enalapril

## 2023-05-22 ENCOUNTER — CARE COORDINATION (OUTPATIENT)
Dept: CARE COORDINATION | Age: 68
End: 2023-05-22

## 2023-05-22 ENCOUNTER — HOSPITAL ENCOUNTER (OUTPATIENT)
Dept: OCCUPATIONAL THERAPY | Age: 68
Setting detail: THERAPIES SERIES
Discharge: HOME | End: 2023-05-22
Payer: MEDICARE

## 2023-05-22 PROCEDURE — 97110 THERAPEUTIC EXERCISES: CPT

## 2023-05-22 PROCEDURE — 97530 THERAPEUTIC ACTIVITIES: CPT

## 2023-05-22 NOTE — CARE COORDINATION
Diana Jose placed an outreach call to [facility] for PCP referral regarding possible HDM eligibility. There was no answer.  [facility] information and request for a return call.     [facility] to follow accordingly

## 2023-05-22 NOTE — PROGRESS NOTES
Eda [facility] THERAPY  DAILY TREATMENT NOTE      [facility]  [address]  [place]   Phone: [phone]   Fax: [phone]     Date:  2023    Initial Evaluation Date: 2023   Evaluating Therapist: Sebas Allison OT    Patient Name:  Candelaria Hanson    :  1955    Restrictions/Precautions:  proximal humerus fx protocol, PWB LUE, blind L eye (long standing), low fall risk  Diagnosis:  closed L proximal humerus fx of the surgical neck and greater tuberosity (non-operative)  S42.292D (ICD-10-CM) - Other displaced fracture of upper end of left humerus, subsequent encounter for fracture with routine healing  Date of Surgery/Injury: injury 23 (10 weeks out)  Referring Practitioner:  Dora Hardin DO (NPI #[id number])  Specific Practitioner Orders: OT Eval and Treat, range of motion, strengthening training, Activities of Daily Living retraining, Independent Activities of Daily Living retraining, adaptive equipment training due to weakness and recent loss of function     Insurance/Certification information:  [address] [place] [address] (no auth required)  Plan of care signed (Y/N): Maureen Boyle)  Visit# / total visits: - visits  Update:  5/10/23,      Assessment of current deficits   [x] ADLs          [x] IADLs         [x] Strength      [x] ROM          [x] Pain            [x] Sensation     [x]Fine Motor Coordination     [x] Edema         [] Scar Adhesion/Skin Integrity   [x] Motor Endurance [x] Gross Motor Coordination  [] Safety Awareness  [] Cognition   [] Balance     [] Functional mobility    [] Functional transfers        [] Vision/perception       OT PLAN OF CARE   OT POC based on physician orders, patient diagnosis and results of clinical assessment.   CPT Codes requested for additional visits: [id number] Troy Salvador, [phone], [phone], [phone], [phone], [id number]   Frequency/Duration: 2x/wk for 16-18 visits  /

## 2023-05-24 ENCOUNTER — HOSPITAL ENCOUNTER (OUTPATIENT)
Dept: OCCUPATIONAL THERAPY | Age: 68
Setting detail: THERAPIES SERIES
Discharge: HOME | End: 2023-05-24
Payer: MEDICARE

## 2023-05-24 PROCEDURE — 97110 THERAPEUTIC EXERCISES: CPT

## 2023-05-24 PROCEDURE — 97140 MANUAL THERAPY 1/> REGIONS: CPT

## 2023-05-24 PROCEDURE — 97530 THERAPEUTIC ACTIVITIES: CPT

## 2023-05-24 NOTE — PROGRESS NOTES
function builds and improvements noted. 7 min        Strengthening:     Shoulder x Heavy tband for scapular retraction/depression 20 x 3    Forearm x Red med flexbar 30 reps each direction, 10 reps green med heavy   Biceps/tri  3# free wts 10 x 3 hammer, rotation,    Shoulder x Wall lift aways 10 reps x 2 sets added to HEP   shoulder  Seated: against gravity overhead flexion 10 x 3 (2#)   Other:     EDUCATION/HEP x Throughout Session with instructions and handouts as needed   Skilled judgment/interventions x Provided in selection of appropriate interventions with patient education in proper ex tech and purpose for exercises, correct performance of therapeutic ex was facilitated with verbal and visual cuing. ASSESSMENT: Patient shows potential to progress with stated goals and will be educated on HEP and provided written handouts as needed throughout their care. Compliance to HEP in integral geoffrey a successful outcome. Has not bought wall pulley yet. Improvement in functional strength and ROM of shoulder noted with increase use of freewts. Needs to continue stretching to reach maximum ROM of shoulder. Encouraged more stretching to end range using supine position and lift and holds for HEP as instructed today in the supine position.        Key: AROM(PROM)        IE 4/12/23  5/10/23  6/7/23   Shoulder Extension: 0/45* WFL  WNL       Flexion: 0/180* 80'(95)  120(130)       Horizontal Abduction:  96'  115       Internal Rotation: 0/70* [facility]  WF       External Rotation: 0/90* 52' 65'           Dynamometer (setting 2) IE  4/12/23  5/10/23     Left 40 #  44     Right 40 #  52     Pinch (lateral)         Left 14 #  14     Right 14 #   14     Pinch (tripod)         Left 9 #   10     Right 9 #   11        9 Hole Peg test  IE 4/12/23       Left 37.48  sec  31 sec     Right 34.85 sec  27 sec        QuickDash  IE        Disability Score 40.91%  4.55%        -Rehab Potential: Good -Requires OT Follow Up: Yes    Treatment

## 2023-05-30 ENCOUNTER — CARE COORDINATION (OUTPATIENT)
Dept: CARE COORDINATION | Age: 68
End: 2023-05-30

## 2023-05-30 NOTE — CARE COORDINATION
Initial Contact Social Work Note - Ambulatory  5/30/2023      Date of referral: 5/22/23  Referral received from: Dr. Madison Holguin MD  Reason for referral: Possible HDM    Previous SW referral: Yes  If yes, brief summary of outcome: NA    Two Identifiers Verified: Yes    Insurance Provider: Winifred MANLEY    Support System: Adult Child/Children, Sibling(s), and friends     Status:  NA    Community Providers: SNAP/Food [place]    ADL Assistance Needed: N/A    Housing/Living Concerns or Home Modification Needs: NA     Transportation Concern: [employer] transport; sister or friend. Doesn't drive due to vision    Medication Cost Concern: NA     Medication Adherence Concern: NA    Financial Concern(s): SSD    Income (only if applicable): NA    Ability to Read/Write: able to read and write but some vision issues with left eye    Advance Care Plan:  N/A    Other: NA    Identified Needs:  HDM    Social Work Plan:  Review resources in place  Merge call to [facility] for HDM    Next Steps: follow up call    Method of Communication With Provider (if appropriate): Chart Routing       Goals Addressed    None        [facility] spoke with Wily today. She states she has support in her apartment from friends and family. She doesn't drive but states she uses the [employer] transport and friends/family to get places. Has a life alert in place. Not active with [employer] and feels there is no need as her bills are managed well. Wily states she is still doing all things on her own. Reports she broke her arm in Feb and is attending outpatient OT. Active listening to Wily tell about her fall and the length of time that she was down on the ground before help came. Processed referral for HDM due to weight loss. Wily states she did lose weight but that she isn't really sure she needs the HDM.   States that she didn't eat/drink much once she was home because she was having too much pain moving and didn't want to have to

## 2023-05-31 ENCOUNTER — APPOINTMENT (OUTPATIENT)
Dept: OCCUPATIONAL THERAPY | Age: 68
End: 2023-05-31
Payer: MEDICARE

## 2023-05-31 DIAGNOSIS — S42.292D OTHER CLOSED DISPLACED FRACTURE OF PROXIMAL END OF LEFT HUMERUS WITH ROUTINE HEALING, SUBSEQUENT ENCOUNTER: Primary | ICD-10-CM

## 2023-05-31 PROCEDURE — 97140 MANUAL THERAPY 1/> REGIONS: CPT

## 2023-05-31 PROCEDURE — 97530 THERAPEUTIC ACTIVITIES: CPT

## 2023-05-31 PROCEDURE — 97110 THERAPEUTIC EXERCISES: CPT

## 2023-05-31 NOTE — PROGRESS NOTES
Certification Period From: 4/12/23  To: 7/12/23     Specific OT Treatment to include:   [x] Instruction in HEP                   Modalities:  [x] Therapeutic Exercise               [x] Ultrasound             [x] PROM/Stretching                    [] Fluidotherapy          []  Paraffin                   [x] AAROM  [x] AROM                 [] Iontophoresis:   [] Tendon Glides                         [x] Electrical Stimulation/Attended                    [x] ADL/IADL re-training               [x] Desensitization/Nerve Stimulation     [x] Therapeutic Activity                 [x] Pain Management with/without modalities PRN  [x] Manual Therapy                      [] Splinting                                   [] Scar Management                   [x] Joint Mobilization                                        [x] Manual Edema Mobilization    [x] Myofascial Release                   [x] GM/FM Coordination               [x] Safety retraining/education per individual diagnosis/goals  [x] Neuromuscular Re-Ed            [x] Energy Conservation/Work Simplification   [x]Joint Protection/Training          []Ergonomics     [] Adaptive Equipment Assessment/Training                                     Patient Specific Goal: \"get better use of my LUE and use both simultaneously\"     Progressing:  Using hand/arm for all ADLs at this time. GOALS STATUS: 5.1.23  1) Patient will demonstrate good understanding of home program with % compliance. Progressing: advanced as progress is made    2) Patient will demonstrate increased active/passive range of motion of their LUE to 3/4 end ranges for ADL/IADL completion. Progressing:  see below    3) Patient will demonstrate increased /pinch strength of at least 10 / 2-3 pinch pounds of their Lhand.     Progressing:  see below    4) Patient will decrease QuickDASH score to 20% or less for increased participation in daily functional

## 2023-06-01 ENCOUNTER — OFFICE VISIT (OUTPATIENT)
Dept: ORTHOPEDIC SURGERY | Age: 68
End: 2023-06-01
Payer: MEDICARE

## 2023-06-01 ENCOUNTER — HOSPITAL ENCOUNTER (OUTPATIENT)
Dept: GENERAL RADIOLOGY | Age: 68
Discharge: HOME | End: 2023-06-03
Payer: MEDICARE

## 2023-06-01 DIAGNOSIS — S42.292D OTHER CLOSED DISPLACED FRACTURE OF PROXIMAL END OF LEFT HUMERUS WITH ROUTINE HEALING, SUBSEQUENT ENCOUNTER: Primary | ICD-10-CM

## 2023-06-01 DIAGNOSIS — S42.292D OTHER CLOSED DISPLACED FRACTURE OF PROXIMAL END OF LEFT HUMERUS WITH ROUTINE HEALING, SUBSEQUENT ENCOUNTER: ICD-10-CM

## 2023-06-01 PROCEDURE — 99213 OFFICE O/P EST LOW 20 MIN: CPT

## 2023-06-01 PROCEDURE — 73030 X-RAY EXAM OF SHOULDER: CPT

## 2023-06-01 NOTE — PROGRESS NOTES
rotation    XR: 6/1/23   2 views left shoulder demonstrate healed proximal humerus fracture without complicating features compared to prior films. Good callus formation noted. LMP 01/02/2015     ASSESSMENT:   Diagnosis Orders   1. Other closed displaced fracture of proximal end of left humerus with routine healing, subsequent encounter          PLAN:  X-rays reviewed and discussed. Weightbearing as tolerated left upper extremity. Continue PT. Follow-up as needed. Call if any questions or concerns. Electronically signed by ZABRINA Oliva on 6/1/2023 at 10:21 AM  Note: This report was completed using [email or web address] voiced recognition software. Every effort has been made to ensure accuracy; however, inadvertent computerized transcription errors may be present.

## 2023-06-01 NOTE — PATIENT INSTRUCTIONS
Weightbearing as tolerated left upper extremity. Continue PT. Follow-up as needed. Call if any questions or concerns.

## 2023-06-05 ENCOUNTER — HOSPITAL ENCOUNTER (OUTPATIENT)
Dept: OCCUPATIONAL THERAPY | Age: 68
Setting detail: THERAPIES SERIES
Discharge: HOME | End: 2023-06-05
Payer: MEDICARE

## 2023-06-05 PROCEDURE — 97530 THERAPEUTIC ACTIVITIES: CPT

## 2023-06-05 PROCEDURE — 97140 MANUAL THERAPY 1/> REGIONS: CPT

## 2023-06-05 PROCEDURE — 97110 THERAPEUTIC EXERCISES: CPT

## 2023-06-05 NOTE — PROGRESS NOTES
speed/resistance change ups as increased function builds and improvements noted. 7 min        Strengthening:     Shoulder x Heavy tband for scapular retraction/depression 20 x 3    Forearm x Red med flexbar 30 reps each direction, 10 reps green med heavy   Biceps/tri  3# free wts 10 x 3 hammer, rotation,    Shoulder x Wall lift aways 10 reps x 2 sets added to HEP   shoulder  Seated: against gravity overhead flexion 10 x 3 (2#)   Other:     EDUCATION/HEP x Throughout Session with instructions and handouts as needed   Skilled judgment/interventions x Provided in selection of appropriate interventions with patient education in proper ex tech and purpose for exercises, correct performance of therapeutic ex was facilitated with verbal and visual cuing. ASSESSMENT: Patient shows potential to progress with stated goals and will be educated on HEP and provided written handouts as needed throughout their care. Compliance to HEP in integral geoffrey a successful outcome. Has not bought wall pulley yet. Improvement in functional strength and ROM of shoulder noted with increase use of freewts. Encouraged more stretching to end range using supine position and lift and holds for HEP as instructed today in the supine position. Reeducation for ROM stretch as she appears hesitate to go over 110' flexion.        Key: AROM(PROM)        IE 4/12/23  5/10/23  6/7/23   Shoulder Extension: 0/45* WFL  WNL       Flexion: 0/180* 80'(95)  120(130)       Horizontal Abduction:  96'  115       Internal Rotation: 0/70* WFL  WFL       External Rotation: 0/90* 52' 65'           Dynamometer (setting 2) IE  4/12/23  5/10/23     Left 40 #  44     Right 40 #  52     Pinch (lateral)         Left 14 #  14     Right 14 #   14     Pinch (tripod)         Left 9 #   10     Right 9 #   11        9 Hole Peg test  IE 4/12/23       Left 37.48  sec  31 sec     Right 34.85 sec  27 sec        QuickDash  IE        Disability Score 40.91%  4.55%        -Rehab

## 2023-06-07 ENCOUNTER — HOSPITAL ENCOUNTER (OUTPATIENT)
Dept: OCCUPATIONAL THERAPY | Age: 68
Setting detail: THERAPIES SERIES
Discharge: HOME | End: 2023-06-07
Payer: MEDICARE

## 2023-06-07 PROCEDURE — 97530 THERAPEUTIC ACTIVITIES: CPT

## 2023-06-07 PROCEDURE — 97110 THERAPEUTIC EXERCISES: CPT

## 2023-06-07 PROCEDURE — 97140 MANUAL THERAPY 1/> REGIONS: CPT

## 2023-06-07 NOTE — PROGRESS NOTES
activities. Progressing:  see below     6) Increase in fine motor function as evidenced by decreased time to complete 9-hole peg test by at least 5-10 seconds. Goal Reached     7) Patient will be knowledgeable of edema control techniques as evident with decreases from moderate to slight/none   Goal Reached     8) Patient to report decreased pain in LUE from 5/10 to 2/10 or less with resistive functional use and sensitivity from moderate to slight. Progressin-3/10 some shooting pain that can be massaged out     TODAY'S TREATMENT   Pain Level: mild, shooting at times when stretching    SUBJECTIVE: Patient seen 1 scheduled visits. States; \"I worked on the [employer] gave me at last visit. Can we make sure I am doing it right? \"     OBJECTIVE:  Week 12+: Engaged patient in the following with focus on ROM, pain, HEP, adaptive equipment/comp tech training, Strengthening, FM/GM, edema control. Reassessment at or by 22.    INTERVENTION: DONE  SPECIFICS/COMMENTS:   Modality:     Fluidotherapy      UltraSound     E-Stim     PARAFFIN     Scar Mass/Edema Control:               PROM/Stretching:     Shoulder x Supine: PROM into forward flexion and then horiz abd Achieved 145' flexion    Shoulder  Joint mob all planes    AROM/AAROM:     Shoulder x Pulley for scapular retraction, forward flexion 8 min  Horiz abd/add end range holds   Shoulder x Scapation on wall 10 reps  yellow band added to HEP   Shoulder    Supine sidelying up and down ladder 10 x 3  Supine sidelying wrist wt circles in horz abd 10 x 3     Shoulder x Pendulums using trigger point hold added to HEP   Shoulder x Wall stretch 3 way 10 reps  added to HEP   Therapeutic Activity:     Shoulder arc  Level 2 table top 3 sets   Resistive pinch pins x Seated to add to franki, seated to remove 2# pins use 1# wrist wt   UBE x To improve motor control/coordination and motor endurance of affected UE, scapular mobility/stability challenge with speed/resistance

## 2023-06-12 ENCOUNTER — APPOINTMENT (OUTPATIENT)
Dept: OCCUPATIONAL THERAPY | Age: 68
End: 2023-06-12
Payer: MEDICARE

## 2023-06-12 NOTE — PROGRESS NOTES
Fab[facility] THERAPY  DAILY TREATMENT NOTE      [facility]  [address]  [place]   Phone: [phone]   Fax: [phone]     Date:  2023    Initial Evaluation Date: 2023   Evaluating Therapist: Yolande Mccann OT    Patient Name:  Heide Garza    :  1955    Restrictions/Precautions:  proximal humerus fx protocol, blind L eye (long standing), low fall risk  Diagnosis:  closed L proximal humerus fx of the surgical neck and greater tuberosity (non-operative)  S42.292D (ICD-10-CM) - Other displaced fracture of upper end of left humerus, subsequent encounter for fracture with routine healing  Date of Surgery/Injury: injury 23 (10 weeks out)  Referring Practitioner:  Navdeep Regalado DO (NPI #[id number])  Specific Practitioner Orders: OT Eval and Treat, range of motion, strengthening training, Activities of Daily Living retraining, Independent Activities of Daily Living retraining, adaptive equipment training due to weakness and recent loss of function   NEW: WBAT    Insurance/Certification information:  [address] [place] [address] (no auth required)  Plan of care signed (Y/N): Maureen Bahena)  Visit# / total visits: 15 / 16- visits  Update:  5/10/23,      Assessment of current deficits   [x] ADLs          [x] IADLs         [x] Strength      [x] ROM          [x] Pain            [x] Sensation     [x]Fine Motor Coordination     [x] Edema         [] Scar Adhesion/Skin Integrity   [x] Motor Endurance [x] Gross Motor Coordination  [] Safety Awareness  [] Cognition   [] Balance     [] Functional mobility    [] Functional transfers        [] Vision/perception       OT PLAN OF CARE   OT POC based on physician orders, patient diagnosis and results of clinical assessment.   CPT Codes requested for additional visits: [id number], Troy Salvador, [phone], [phone], [phone], [id number], [id number]     Frequency/Duration: 2x/wk for 16-18 visits  /

## 2023-06-14 ENCOUNTER — APPOINTMENT (OUTPATIENT)
Dept: OCCUPATIONAL THERAPY | Age: 68
End: 2023-06-14
Payer: MEDICARE

## 2023-06-14 NOTE — PROGRESS NOTES
Eda [facility] THERAPY  DAILY TREATMENT NOTE      [facility]  [address]  [place]   Phone: [phone]   Fax: [phone]     Date:  2023    Initial Evaluation Date: 2023   Evaluating Therapist: Robina Winston OT    Patient Name:  Rafiq Pearce    :  1955    Restrictions/Precautions:  proximal humerus fx protocol, blind L eye (long standing), low fall risk  Diagnosis:  closed L proximal humerus fx of the surgical neck and greater tuberosity (non-operative)  S42.292D (ICD-10-CM) - Other displaced fracture of upper end of left humerus, subsequent encounter for fracture with routine healing  Date of Surgery/Injury: injury 23 (10 weeks out)  Referring Practitioner:  Elvia Zapata DO (NPI #[id number])  Specific Practitioner Orders: OT Eval and Treat, range of motion, strengthening training, Activities of Daily Living retraining, Independent Activities of Daily Living retraining, adaptive equipment training due to weakness and recent loss of function   NEW: WBAT    Insurance/Certification information:  [address] [place] [address] (no auth required)  Plan of care signed (Y/N): Maureen Landa)  Visit# / total visits: - visits  Update:  5/10/23,      Assessment of current deficits   [x] ADLs          [x] IADLs         [x] Strength      [x] ROM          [x] Pain            [x] Sensation     [x]Fine Motor Coordination     [x] Edema         [] Scar Adhesion/Skin Integrity   [x] Motor Endurance [x] Gross Motor Coordination  [] Safety Awareness  [] Cognition   [] Balance     [] Functional mobility    [] Functional transfers        [] Vision/perception       OT PLAN OF CARE   OT POC based on physician orders, patient diagnosis and results of clinical assessment.   CPT Codes requested for additional visits: [id number], Troy Salvador, [id number], [phone], [phone], [phone], [id number]     Frequency/Duration: 2x/wk for 16-18 visits  /

## 2023-06-19 ENCOUNTER — APPOINTMENT (OUTPATIENT)
Dept: OCCUPATIONAL THERAPY | Age: 68
End: 2023-06-19
Payer: MEDICARE

## 2023-06-19 PROCEDURE — 97530 THERAPEUTIC ACTIVITIES: CPT

## 2023-06-19 PROCEDURE — 97110 THERAPEUTIC EXERCISES: CPT

## 2023-06-19 NOTE — PROGRESS NOTES
Eda 30 THERAPY  DAILY TREATMENT NOTE      [facility]  [address]  [place]   Phone: [phone]   Fax: [phone]     Date:  2023    Initial Evaluation Date: 2023   Evaluating Therapist: Chidi Martin OT    Patient Name:  Ellyn Salamanca    :  1955    Restrictions/Precautions:  proximal humerus fx protocol, blind L eye (long standing), low fall risk  Diagnosis:  closed L proximal humerus fx of the surgical neck and greater tuberosity (non-operative)  S42.292D (ICD-10-CM) - Other displaced fracture of upper end of left humerus, subsequent encounter for fracture with routine healing  Date of Surgery/Injury: injury 23 (10 weeks out)  Referring Practitioner:  DO Marbella (NPI #[id number])  Specific Practitioner Orders: OT Eval and Treat, range of motion, strengthening training, Activities of Daily Living retraining, Independent Activities of Daily Living retraining, adaptive equipment training due to weakness and recent loss of function   NEW: WBAT    Insurance/Certification information:  [address] [place] [address] (no auth required)  Plan of care signed (Y/N): Electronic Jamas Grater)  Visit# / total visits: - visits  Update:  5/10/23,      Assessment of current deficits   [x] ADLs          [x] IADLs         [x] Strength      [x] ROM          [x] Pain            [x] Sensation     [x]Fine Motor Coordination     [x] Edema         [] Scar Adhesion/Skin Integrity   [x] Motor Endurance [x] Gross Motor Coordination  [] Safety Awareness  [] Cognition   [] Balance     [] Functional mobility    [] Functional transfers        [] Vision/perception       OT PLAN OF CARE   OT POC based on physician orders, patient diagnosis and results of clinical assessment.   CPT Codes requested for additional visits: [id number], Troy Salvador, [id number], [phone], [phone], [phone], [id number]     Frequency/Duration: 2x/wk for 16-18 visits  /

## 2023-06-20 ENCOUNTER — OFFICE VISIT (OUTPATIENT)
Dept: NEUROLOGY | Age: 68
End: 2023-06-20
Payer: MEDICARE

## 2023-06-20 VITALS
SYSTOLIC BLOOD PRESSURE: 156 MMHG | OXYGEN SATURATION: 96 % | HEIGHT: 64 IN | DIASTOLIC BLOOD PRESSURE: 77 MMHG | WEIGHT: 133 LBS | HEART RATE: 75 BPM | BODY MASS INDEX: 22.71 KG/M2 | RESPIRATION RATE: 18 BRPM

## 2023-06-20 DIAGNOSIS — G43.009 MIGRAINE WITHOUT AURA AND WITHOUT STATUS MIGRAINOSUS, NOT INTRACTABLE: Primary | ICD-10-CM

## 2023-06-20 PROBLEM — S42.202A CLOSED FRACTURE OF LEFT PROXIMAL HUMERUS: Status: RESOLVED | Noted: 2023-02-07 | Resolved: 2023-06-20

## 2023-06-20 PROCEDURE — 1090F PRES/ABSN URINE INCON ASSESS: CPT | Performed by: NURSE PRACTITIONER

## 2023-06-20 PROCEDURE — G8420 CALC BMI NORM PARAMETERS: HCPCS | Performed by: NURSE PRACTITIONER

## 2023-06-20 PROCEDURE — 99214 OFFICE O/P EST MOD 30 MIN: CPT | Performed by: NURSE PRACTITIONER

## 2023-06-20 PROCEDURE — 3017F COLORECTAL CA SCREEN DOC REV: CPT | Performed by: NURSE PRACTITIONER

## 2023-06-20 PROCEDURE — 1123F ACP DISCUSS/DSCN MKR DOCD: CPT | Performed by: NURSE PRACTITIONER

## 2023-06-20 PROCEDURE — G8427 DOCREV CUR MEDS BY ELIG CLIN: HCPCS | Performed by: NURSE PRACTITIONER

## 2023-06-20 PROCEDURE — 1036F TOBACCO NON-USER: CPT | Performed by: NURSE PRACTITIONER

## 2023-06-20 PROCEDURE — G8399 PT W/DXA RESULTS DOCUMENT: HCPCS | Performed by: NURSE PRACTITIONER

## 2023-06-20 NOTE — PROGRESS NOTES
[address] [facility] MSN, APRN-CNP, [address], [address] [facility]      [phone]                                  Office Follow-Up     Ellyn Salamanca is a 74 y.o. female     We are following her for migraines    She presents alone and remains a good historian     Headaches are nonproblematic and she has Nurtec for acute therapy and Fiorinal on backup for severe pains, which she almost never uses. She fell and broke her left arm recently and is finishing up therapy for such--she lost some weight around the time of her injury and is trying to eat more. She has no other new complaints today. Medications failed: Amitriptyline caused sedation. Triptans are contraindicated with her use of other serotonergic medications--she is already on Lexapro and mirtazapine. She is on Synthroid---last TSH >9    No chest pain or palpitations  No SOB  No vertigo, lightheadedness or loss of consciousness  No falls, tripping or stumbling  No incontinence of bowels or bladder  No itching or bruising   No numbness, tingling or focal arm/leg weakness  No speech or swallowing problems    ROS otherwise negative      Medications:     Prior to Admission medications    Medication Sig Start Date End Date Taking?  Authorizing Provider   levothyroxine (SYNTHROID) 75 MCG tablet Take 1 tablet by mouth daily 5/23/23  Yes Meghana Simental MD   mirtazapine (REMERON) 7.5 MG tablet TAKE 1 TABLET BY MOUTH NIGHTLY FOR INSOMNIA 5/12/23  Yes Meghana Simental MD   fluticasone (FLOVENT HFA) 110 MCG/ACT inhaler INHALE 1 PUFF BY MOUTH TWICE A DAY 5/12/23  Yes Meghana Simental MD   amLODIPine (NORVASC) 5 MG tablet TAKE 1 TABLET BY MOUTH EVERY DAY IN THE EVENING 5/12/23  Yes Meghana Simental MD   escitalopram (LEXAPRO) 10 MG tablet TAKE 1 TABLET BY MOUTH EVERY DAY 5/12/23  Yes Meghana Simental MD   enalapril (VASOTEC) 2.5 MG tablet TAKE 1 TABLET BY MOUTH EVERY DAY IN THE MORNING 5/2/23  Yes

## 2023-06-21 ENCOUNTER — APPOINTMENT (OUTPATIENT)
Dept: OCCUPATIONAL THERAPY | Age: 68
End: 2023-06-21
Payer: MEDICARE

## 2023-06-21 PROCEDURE — 97110 THERAPEUTIC EXERCISES: CPT

## 2023-06-21 PROCEDURE — 97530 THERAPEUTIC ACTIVITIES: CPT

## 2023-06-21 NOTE — PROGRESS NOTES
[facility] THERAPY  DAILY TREATMENT NOTE    DISCHARGE SUMMARY      [facility]  [address]  Luis E Crow   Phone: [phone]   Fax: [phone]     Date:  2023    Initial Evaluation Date: 2023   Evaluating Therapist: Jh Douglass OT    Patient Name:  Macho Vogel    :  1955    Restrictions/Precautions:  proximal humerus fx protocol, blind L eye (long standing), low fall risk  Diagnosis:  closed L proximal humerus fx of the surgical neck and greater tuberosity (non-operative)  S42.292D (ICD-10-CM) - Other displaced fracture of upper end of left humerus, subsequent encounter for fracture with routine healing  Date of Surgery/Injury: injury 23 (10 weeks out)  Referring Practitioner:  Surya Elmore DO (NPI #[id number])  Specific Practitioner Orders: OT Eval and Treat, range of motion, strengthening training, Activities of Daily Living retraining, Independent Activities of Daily Living retraining, adaptive equipment training due to weakness and recent loss of function   NEW: WBAT    Insurance/Certification information:  [address] [place] [address] (no auth required)  Plan of care signed (Y/N): Maureen Velarde  Visit# / total visits: - visits   Updates:  5/10/23, 23     Assessment of current deficits   [x] ADLs          [x] IADLs         [x] Strength      [x] ROM          [x] Pain            [x] Sensation     [x]Fine Motor Coordination     [x] Edema         [] Scar Adhesion/Skin Integrity   [x] Motor Endurance [x] Gross Motor Coordination  [] Safety Awareness  [] Cognition   [] Balance     [] Functional mobility    [] Functional transfers        [] Vision/perception       OT PLAN OF CARE   OT POC based on physician orders, patient diagnosis and results of clinical assessment.   CPT Codes requested for additional visits: [address] [facility] [place] [facility] [address] [facility], [id number], [address] [facility], [address] [place] [address], [facility]

## 2023-06-26 ENCOUNTER — APPOINTMENT (OUTPATIENT)
Dept: OCCUPATIONAL THERAPY | Age: 68
End: 2023-06-26
Payer: MEDICARE

## 2023-08-24 ENCOUNTER — OFFICE VISIT (OUTPATIENT)
Dept: FAMILY MEDICINE CLINIC | Age: 68
End: 2023-08-24
Payer: MEDICARE

## 2023-08-24 VITALS
TEMPERATURE: 97.3 F | HEIGHT: 64 IN | WEIGHT: 133 LBS | DIASTOLIC BLOOD PRESSURE: 60 MMHG | HEART RATE: 67 BPM | RESPIRATION RATE: 16 BRPM | OXYGEN SATURATION: 96 % | SYSTOLIC BLOOD PRESSURE: 150 MMHG | BODY MASS INDEX: 22.71 KG/M2

## 2023-08-24 DIAGNOSIS — F32.89 OTHER DEPRESSION: ICD-10-CM

## 2023-08-24 DIAGNOSIS — Z12.11 SCREEN FOR COLON CANCER: ICD-10-CM

## 2023-08-24 DIAGNOSIS — I10 PRIMARY HYPERTENSION: Primary | ICD-10-CM

## 2023-08-24 PROCEDURE — G8399 PT W/DXA RESULTS DOCUMENT: HCPCS | Performed by: FAMILY MEDICINE

## 2023-08-24 PROCEDURE — 1036F TOBACCO NON-USER: CPT | Performed by: FAMILY MEDICINE

## 2023-08-24 PROCEDURE — 3017F COLORECTAL CA SCREEN DOC REV: CPT | Performed by: FAMILY MEDICINE

## 2023-08-24 PROCEDURE — 99214 OFFICE O/P EST MOD 30 MIN: CPT | Performed by: FAMILY MEDICINE

## 2023-08-24 PROCEDURE — 1090F PRES/ABSN URINE INCON ASSESS: CPT | Performed by: FAMILY MEDICINE

## 2023-08-24 PROCEDURE — G8420 CALC BMI NORM PARAMETERS: HCPCS | Performed by: FAMILY MEDICINE

## 2023-08-24 PROCEDURE — 3078F DIAST BP <80 MM HG: CPT | Performed by: FAMILY MEDICINE

## 2023-08-24 PROCEDURE — G8427 DOCREV CUR MEDS BY ELIG CLIN: HCPCS | Performed by: FAMILY MEDICINE

## 2023-08-24 PROCEDURE — 1123F ACP DISCUSS/DSCN MKR DOCD: CPT | Performed by: FAMILY MEDICINE

## 2023-08-24 PROCEDURE — 3077F SYST BP >= 140 MM HG: CPT | Performed by: FAMILY MEDICINE

## 2023-08-24 RX ORDER — ESCITALOPRAM OXALATE 10 MG/1
10 TABLET ORAL DAILY
Qty: 90 TABLET | Refills: 0 | Status: SHIPPED | OUTPATIENT
Start: 2023-08-24

## 2023-08-24 RX ORDER — MIRTAZAPINE 7.5 MG/1
TABLET, FILM COATED ORAL
Qty: 90 TABLET | Refills: 0 | Status: SHIPPED | OUTPATIENT
Start: 2023-08-24

## 2023-08-24 RX ORDER — AMLODIPINE BESYLATE 5 MG/1
5 TABLET ORAL EVERY EVENING
Qty: 90 TABLET | Refills: 0 | Status: SHIPPED | OUTPATIENT
Start: 2023-08-24

## 2023-08-24 RX ORDER — LEVOTHYROXINE SODIUM 0.07 MG/1
75 TABLET ORAL DAILY
Qty: 90 TABLET | Refills: 0 | Status: SHIPPED | OUTPATIENT
Start: 2023-08-24

## 2023-08-24 RX ORDER — ENALAPRIL MALEATE 5 MG/1
5 TABLET ORAL EVERY MORNING
Qty: 90 TABLET | Refills: 0 | Status: SHIPPED | OUTPATIENT
Start: 2023-08-24

## 2023-08-24 SDOH — ECONOMIC STABILITY: FOOD INSECURITY: WITHIN THE PAST 12 MONTHS, THE FOOD YOU BOUGHT JUST DIDN'T LAST AND YOU DIDN'T HAVE MONEY TO GET MORE.: NEVER TRUE

## 2023-08-24 SDOH — ECONOMIC STABILITY: HOUSING INSECURITY
IN THE LAST 12 MONTHS, WAS THERE A TIME WHEN YOU DID NOT HAVE A STEADY PLACE TO SLEEP OR SLEPT IN A SHELTER (INCLUDING NOW)?: NO

## 2023-08-24 SDOH — ECONOMIC STABILITY: FOOD INSECURITY: WITHIN THE PAST 12 MONTHS, YOU WORRIED THAT YOUR FOOD WOULD RUN OUT BEFORE YOU GOT MONEY TO BUY MORE.: NEVER TRUE

## 2023-08-24 SDOH — ECONOMIC STABILITY: INCOME INSECURITY: HOW HARD IS IT FOR YOU TO PAY FOR THE VERY BASICS LIKE FOOD, HOUSING, MEDICAL CARE, AND HEATING?: NOT VERY HARD

## 2023-08-24 ASSESSMENT — ENCOUNTER SYMPTOMS
WHEEZING: 0
COUGH: 0

## 2023-09-06 ENCOUNTER — TELEPHONE (OUTPATIENT)
Dept: FAMILY MEDICINE CLINIC | Age: 68
End: 2023-09-06

## 2023-09-06 NOTE — TELEPHONE ENCOUNTER
Last Appointment   8/24/2023  Next Appointment  11/2/2023  Patient called in to see if you can do a letter for her to [facility] for an air conditioner, you just need to do a letter with why she would benefit from one and she can get it for free, please call patient when done and we can fax or she will

## 2023-09-07 NOTE — TELEPHONE ENCOUNTER
See communications - letter composed. Please print for patient. Let me know if you need my signature.

## 2023-10-23 DIAGNOSIS — F32.89 OTHER DEPRESSION: ICD-10-CM

## 2023-10-23 DIAGNOSIS — E03.0 CONGENITAL HYPOTHYROIDISM WITH DIFFUSE GOITER: ICD-10-CM

## 2023-10-23 DIAGNOSIS — G47.00 INSOMNIA, UNSPECIFIED: ICD-10-CM

## 2023-10-23 DIAGNOSIS — I10 PRIMARY HYPERTENSION: ICD-10-CM

## 2023-10-24 RX ORDER — LEVOTHYROXINE SODIUM 0.07 MG/1
75 TABLET ORAL DAILY
Qty: 90 TABLET | Refills: 0 | OUTPATIENT
Start: 2023-10-24

## 2023-10-24 RX ORDER — AMLODIPINE BESYLATE 5 MG/1
5 TABLET ORAL EVERY EVENING
Qty: 90 TABLET | Refills: 0 | OUTPATIENT
Start: 2023-10-24

## 2023-10-24 RX ORDER — ESCITALOPRAM OXALATE 10 MG/1
10 TABLET ORAL DAILY
Qty: 90 TABLET | Refills: 0 | OUTPATIENT
Start: 2023-10-24

## 2023-10-24 RX ORDER — ENALAPRIL MALEATE 5 MG/1
5 TABLET ORAL EVERY MORNING
Qty: 90 TABLET | Refills: 0 | OUTPATIENT
Start: 2023-10-24

## 2023-10-24 RX ORDER — MIRTAZAPINE 7.5 MG/1
TABLET, FILM COATED ORAL
Qty: 90 TABLET | Refills: 0 | OUTPATIENT
Start: 2023-10-24

## 2023-11-02 ENCOUNTER — OFFICE VISIT (OUTPATIENT)
Dept: FAMILY MEDICINE CLINIC | Age: 68
End: 2023-11-02
Payer: MEDICARE

## 2023-11-02 VITALS
OXYGEN SATURATION: 97 % | BODY MASS INDEX: 22.88 KG/M2 | HEART RATE: 77 BPM | HEIGHT: 64 IN | SYSTOLIC BLOOD PRESSURE: 124 MMHG | WEIGHT: 134 LBS | DIASTOLIC BLOOD PRESSURE: 72 MMHG | TEMPERATURE: 97.1 F | RESPIRATION RATE: 16 BRPM

## 2023-11-02 DIAGNOSIS — Z23 NEED FOR INFLUENZA VACCINATION: ICD-10-CM

## 2023-11-02 DIAGNOSIS — J45.30 MILD PERSISTENT ASTHMA WITHOUT COMPLICATION: ICD-10-CM

## 2023-11-02 DIAGNOSIS — I10 PRIMARY HYPERTENSION: Primary | ICD-10-CM

## 2023-11-02 DIAGNOSIS — E03.0 CONGENITAL HYPOTHYROIDISM WITH DIFFUSE GOITER: ICD-10-CM

## 2023-11-02 LAB
ABSOLUTE IMMATURE GRANULOCYTE: <0.03 K/UL (ref 0–0.58)
ALBUMIN SERPL-MCNC: 4.3 G/DL (ref 3.5–5.2)
ALP BLD-CCNC: 92 U/L (ref 35–104)
ALT SERPL-CCNC: 7 U/L (ref 0–32)
ANION GAP SERPL CALCULATED.3IONS-SCNC: 14 MMOL/L (ref 7–16)
AST SERPL-CCNC: 14 U/L (ref 0–31)
BASOPHILS ABSOLUTE: 0.03 K/UL (ref 0–0.2)
BASOPHILS RELATIVE PERCENT: 1 % (ref 0–2)
BILIRUB SERPL-MCNC: 0.4 MG/DL (ref 0–1.2)
BUN BLDV-MCNC: 7 MG/DL (ref 6–23)
CALCIUM SERPL-MCNC: 9.6 MG/DL (ref 8.6–10.2)
CHLORIDE BLD-SCNC: 104 MMOL/L (ref 98–107)
CHOLESTEROL: 221 MG/DL
CO2: 24 MMOL/L (ref 22–29)
CREAT SERPL-MCNC: 0.7 MG/DL (ref 0.5–1)
EOSINOPHILS ABSOLUTE: 0.12 K/UL (ref 0.05–0.5)
EOSINOPHILS RELATIVE PERCENT: 2 % (ref 0–6)
GFR SERPL CREATININE-BSD FRML MDRD: >60 ML/MIN/1.73M2
GLUCOSE BLD-MCNC: 90 MG/DL (ref 74–99)
HCT VFR BLD CALC: 41.5 % (ref 34–48)
HDLC SERPL-MCNC: 75 MG/DL
HEMOGLOBIN: 13.4 G/DL (ref 11.5–15.5)
IMMATURE GRANULOCYTES: 0 % (ref 0–5)
LDL CHOLESTEROL: 131 MG/DL
LYMPHOCYTES ABSOLUTE: 1.4 K/UL (ref 1.5–4)
LYMPHOCYTES RELATIVE PERCENT: 25 % (ref 20–42)
MCH RBC QN AUTO: 25.9 PG (ref 26–35)
MCHC RBC AUTO-ENTMCNC: 32.3 G/DL (ref 32–34.5)
MCV RBC AUTO: 80.3 FL (ref 80–99.9)
MONOCYTES ABSOLUTE: 0.5 K/UL (ref 0.1–0.95)
MONOCYTES RELATIVE PERCENT: 9 % (ref 2–12)
NEUTROPHILS ABSOLUTE: 3.47 K/UL (ref 1.8–7.3)
NEUTROPHILS RELATIVE PERCENT: 63 % (ref 43–80)
PDW BLD-RTO: 14.2 % (ref 11.5–15)
PLATELET # BLD: 257 K/UL (ref 130–450)
PMV BLD AUTO: 10.6 FL (ref 7–12)
POTASSIUM SERPL-SCNC: 3.9 MMOL/L (ref 3.5–5)
RBC # BLD: 5.17 M/UL (ref 3.5–5.5)
SODIUM BLD-SCNC: 142 MMOL/L (ref 132–146)
TOTAL PROTEIN: 8.5 G/DL (ref 6.4–8.3)
TRIGL SERPL-MCNC: 73 MG/DL
TSH SERPL DL<=0.05 MIU/L-ACNC: 2.45 UIU/ML (ref 0.27–4.2)
VLDLC SERPL CALC-MCNC: 15 MG/DL
WBC # BLD: 5.5 K/UL (ref 4.5–11.5)

## 2023-11-02 PROCEDURE — 36415 COLL VENOUS BLD VENIPUNCTURE: CPT | Performed by: FAMILY MEDICINE

## 2023-11-02 PROCEDURE — 3074F SYST BP LT 130 MM HG: CPT | Performed by: FAMILY MEDICINE

## 2023-11-02 PROCEDURE — 1090F PRES/ABSN URINE INCON ASSESS: CPT | Performed by: FAMILY MEDICINE

## 2023-11-02 PROCEDURE — G8484 FLU IMMUNIZE NO ADMIN: HCPCS | Performed by: FAMILY MEDICINE

## 2023-11-02 PROCEDURE — 90694 VACC AIIV4 NO PRSRV 0.5ML IM: CPT | Performed by: FAMILY MEDICINE

## 2023-11-02 PROCEDURE — G8427 DOCREV CUR MEDS BY ELIG CLIN: HCPCS | Performed by: FAMILY MEDICINE

## 2023-11-02 PROCEDURE — 1123F ACP DISCUSS/DSCN MKR DOCD: CPT | Performed by: FAMILY MEDICINE

## 2023-11-02 PROCEDURE — G8399 PT W/DXA RESULTS DOCUMENT: HCPCS | Performed by: FAMILY MEDICINE

## 2023-11-02 PROCEDURE — G0008 ADMIN INFLUENZA VIRUS VAC: HCPCS | Performed by: FAMILY MEDICINE

## 2023-11-02 PROCEDURE — 3017F COLORECTAL CA SCREEN DOC REV: CPT | Performed by: FAMILY MEDICINE

## 2023-11-02 PROCEDURE — 3078F DIAST BP <80 MM HG: CPT | Performed by: FAMILY MEDICINE

## 2023-11-02 PROCEDURE — G8420 CALC BMI NORM PARAMETERS: HCPCS | Performed by: FAMILY MEDICINE

## 2023-11-02 PROCEDURE — 1036F TOBACCO NON-USER: CPT | Performed by: FAMILY MEDICINE

## 2023-11-02 PROCEDURE — 99213 OFFICE O/P EST LOW 20 MIN: CPT | Performed by: FAMILY MEDICINE

## 2023-11-02 NOTE — PROGRESS NOTES
Differential; Future  -     [facility] Referral to Social Work (Primary Care Only)  -     CBC with Auto Differential  -     Comprehensive Metabolic Panel  -     Lipid Panel    Congenital hypothyroidism with diffuse goiter  Repeat tsh today  -     TSH; Future  -     TSH    Mild persistent asthma without complication  Stable but having leaks in apartment   Cont ics at low dose  -     [facility] Referral to Social Work (Primary Care Only)    Need for influenza vaccination  -     Influenza, FLUAD, (age 72 y+), IM, Preservative Free, 0.5 mL        Return in about 3 months (around 2/2/2024) for hypertension, asthma. Advised patient to call with any new medication issues. All questions answered.   Call or go to emergency department ifsymptoms worsen or persist.

## 2023-11-03 DIAGNOSIS — F32.89 OTHER DEPRESSION: ICD-10-CM

## 2023-11-03 DIAGNOSIS — I10 PRIMARY HYPERTENSION: ICD-10-CM

## 2023-11-03 RX ORDER — MIRTAZAPINE 7.5 MG/1
TABLET, FILM COATED ORAL
Qty: 90 TABLET | Refills: 0 | Status: SHIPPED
Start: 2023-11-03 | End: 2023-12-12

## 2023-11-03 RX ORDER — AMLODIPINE BESYLATE 5 MG/1
5 TABLET ORAL EVERY EVENING
Qty: 90 TABLET | Refills: 0 | Status: SHIPPED
Start: 2023-11-03 | End: 2023-12-12

## 2023-11-03 RX ORDER — ESCITALOPRAM OXALATE 10 MG/1
10 TABLET ORAL DAILY
Qty: 90 TABLET | Refills: 0 | Status: SHIPPED
Start: 2023-11-03 | End: 2023-12-12

## 2023-11-03 RX ORDER — LEVOTHYROXINE SODIUM 0.07 MG/1
75 TABLET ORAL DAILY
Qty: 90 TABLET | Refills: 0 | Status: SHIPPED
Start: 2023-11-03 | End: 2023-12-12

## 2023-11-03 RX ORDER — ENALAPRIL MALEATE 5 MG/1
5 TABLET ORAL EVERY MORNING
Qty: 90 TABLET | Refills: 0 | Status: SHIPPED
Start: 2023-11-03 | End: 2023-12-12

## 2023-11-07 ENCOUNTER — CARE COORDINATION (OUTPATIENT)
Dept: CARE COORDINATION | Age: 68
End: 2023-11-07

## 2023-11-07 NOTE — CARE COORDINATION
Initial Contact Social Work Note - Ambulatory  11/7/2023      Date of referral: 11/6/2023  Referral received from: PCP Dr. Adeola Jim  Reason for referral: housing issues, needs to move    Previous SW referral: Yes  If yes, brief summary of outcome: Referral for recliner chair, made [facility] referral     Two Identifiers Verified: Yes    Insurance Provider: [facility] Medicare Dual Complete, Medicaid    Support System:   family    [place] Status:  No    Community Providers: Meals on [employer] and SNAP/Food [place]    ADL Assistance Needed: N/A    Housing/Living Concerns or Home Modification Needs: N/A     Transportation Concern: Pt takes bus, has medical transportation and family that transports her    Medication Cost Concern: No    Medication Adherence Concern: No    Financial Concern(s): No    Income (only if applicable): Social Security    Ability to Read/Write: Yes    Advance Care Plan:  N/A    Other: N/A    Identified Needs:  Pt is trying to move d/t rent going up and roof leaking      Social Work Plan:  Pt reports she has already contacted her [address] [place] [address] Drive and realtor, they are working on this for pt  Pt is not interested in any [address] [place] [address] resources  Next Steps: [facility] to f/u in a few weeks  Method of Communication With Provider (if appropriate): Chart Routing       Goals Addressed    None      [facility] made call to pt, reviewed referral and completed SW assessment. Pt's roof is leaking and she is looking to move. Pt is living in subsidized housing through [facility] and has already contacted her  and realtor regarding this issue. Pt reports they are working on it. Reviewed that the only other resources this [facility] is aware of is Senior Independent living apartments which are subsidized housing. Pt is not interested in resources. Pt plans to wait to hear from her CM. No other questions or needs reported. [facility] to f/u in a few weeks.     Russell Nelson MSW, [address] [place] [address]

## 2023-11-28 ENCOUNTER — CARE COORDINATION (OUTPATIENT)
Dept: CARE COORDINATION | Age: 68
End: 2023-11-28

## 2023-11-28 NOTE — CARE COORDINATION
[facility] made f/u call to pt, she reports she has completed application , provided the $20 fee for Section 8 apartment she wishes to move to, but has not heard anything. Pt reports she is currently in a Section 8 apartment and provided them with a 30 day notice. Pt plans to call apartment office to f/u. Pt did not need any assistance with making calls. Pt reports she will f/u with this [facility] after the call.     Randall VICK, [address] [facility]   Care Coordinator  [phone]

## 2023-12-05 ENCOUNTER — CARE COORDINATION (OUTPATIENT)
Dept: CARE COORDINATION | Age: 68
End: 2023-12-05

## 2023-12-05 NOTE — CARE COORDINATION
[facility] made f/u call to pt, she reports the apartment she was awaiting is not able to take her d/t accepting another resident. Pt has info she was provided by someone for a realty company which she plans to call today. Pt does not need assistance with [employer] calls. Pt is not interested in senior living apartment resources. [facility] to f/u again, as final outreach.

## 2023-12-11 DIAGNOSIS — I10 PRIMARY HYPERTENSION: ICD-10-CM

## 2023-12-11 DIAGNOSIS — J45.30 MILD PERSISTENT ASTHMA WITHOUT COMPLICATION: ICD-10-CM

## 2023-12-11 DIAGNOSIS — F32.89 OTHER DEPRESSION: ICD-10-CM

## 2023-12-12 RX ORDER — FLUTICASONE PROPIONATE 110 UG/1
AEROSOL, METERED RESPIRATORY (INHALATION)
Qty: 12 EACH | Refills: 3 | Status: SHIPPED | OUTPATIENT
Start: 2023-12-12

## 2023-12-12 RX ORDER — MIRTAZAPINE 7.5 MG/1
TABLET, FILM COATED ORAL
Qty: 90 TABLET | Refills: 0 | Status: SHIPPED | OUTPATIENT
Start: 2023-12-12

## 2023-12-12 RX ORDER — AMLODIPINE BESYLATE 5 MG/1
5 TABLET ORAL EVERY EVENING
Qty: 90 TABLET | Refills: 0 | Status: SHIPPED | OUTPATIENT
Start: 2023-12-12

## 2023-12-12 RX ORDER — ENALAPRIL MALEATE 5 MG/1
5 TABLET ORAL EVERY MORNING
Qty: 90 TABLET | Refills: 0 | Status: SHIPPED | OUTPATIENT
Start: 2023-12-12

## 2023-12-12 RX ORDER — LEVOTHYROXINE SODIUM 0.07 MG/1
75 TABLET ORAL DAILY
Qty: 90 TABLET | Refills: 0 | Status: SHIPPED | OUTPATIENT
Start: 2023-12-12

## 2023-12-12 RX ORDER — ESCITALOPRAM OXALATE 10 MG/1
10 TABLET ORAL DAILY
Qty: 90 TABLET | Refills: 0 | Status: SHIPPED | OUTPATIENT
Start: 2023-12-12

## 2023-12-12 RX ORDER — ROSUVASTATIN CALCIUM 10 MG/1
TABLET, COATED ORAL
Qty: 90 TABLET | Refills: 0 | Status: SHIPPED | OUTPATIENT
Start: 2023-12-12

## 2023-12-13 ENCOUNTER — CARE COORDINATION (OUTPATIENT)
Dept: CARE COORDINATION | Age: 68
End: 2023-12-13

## 2023-12-13 NOTE — CARE COORDINATION
[facility] received call from PCP office stating pt was asking for this [facility] contact info. Received call from pt, explaining she has been having issues with section 8 housing, looking for a new 2 bedroom apartment. Pt reports she currently is in a 2 bedroom apartment. Pt's casemanager with Section 8 is telling pt she can only get a 1 bedroom, pt wants a 2 bedroom apartment.  told pt that if PCP writes a letter stating pt needs a 2 bedroom they can look for a 2 bedroom. Pt reports her sister or daughter stay with her at times when she needs their assistance. Made [employer] call to [address] [place] [address] [place] office, spoke with KIT Cage to inform of pt's request. Pt took paperwork to PCP office this morning that has contact info on where letter needs to be sent to. Pt is not interested in [facility] referral as she does not feel she needs aide services at this time but wrote down [facility] contact info.     Nicolasa VICK, [place]   Care Coordinator  [phone]

## 2023-12-14 NOTE — TELEPHONE ENCOUNTER
Pharmacy requested alternate for Flovent  MCG inhaler d/t insurance not covering Flovent, inhaler pended is an alternative covered by insurance

## 2023-12-15 ENCOUNTER — TELEPHONE (OUTPATIENT)
Dept: FAMILY MEDICINE CLINIC | Age: 68
End: 2023-12-15

## 2023-12-15 NOTE — TELEPHONE ENCOUNTER
Letter written for housing accomodations   Please let pt know and fax where needed. LMOM for  to return call.

## 2023-12-27 ENCOUNTER — CARE COORDINATION (OUTPATIENT)
Dept: CARE COORDINATION | Age: 68
End: 2023-12-27

## 2023-12-27 NOTE — CARE COORDINATION
[facility] made f/u to pt, per chart, PCP office has faxed housing accomodation letter for pt. Pt reports she does not have any other questions or needs at this time. CC to sign off but encouraged call back to this [facility] with any SW needs.     Russell VICK, [place]   Care Coordinator  [phone]

## 2024-02-16 ENCOUNTER — OFFICE VISIT (OUTPATIENT)
Dept: FAMILY MEDICINE CLINIC | Age: 69
End: 2024-02-16
Payer: MEDICARE

## 2024-02-16 VITALS
RESPIRATION RATE: 17 BRPM | SYSTOLIC BLOOD PRESSURE: 135 MMHG | HEART RATE: 72 BPM | BODY MASS INDEX: 24.6 KG/M2 | WEIGHT: 143.3 LBS | OXYGEN SATURATION: 97 % | DIASTOLIC BLOOD PRESSURE: 80 MMHG | TEMPERATURE: 97.9 F

## 2024-02-16 DIAGNOSIS — G43.109 MIGRAINE WITH AURA AND WITHOUT STATUS MIGRAINOSUS, NOT INTRACTABLE: Chronic | ICD-10-CM

## 2024-02-16 DIAGNOSIS — F32.89 OTHER DEPRESSION: ICD-10-CM

## 2024-02-16 DIAGNOSIS — Z23 NEED FOR PNEUMOCOCCAL 20-VALENT CONJUGATE VACCINATION: ICD-10-CM

## 2024-02-16 DIAGNOSIS — I10 PRIMARY HYPERTENSION: Primary | ICD-10-CM

## 2024-02-16 DIAGNOSIS — D56.0 ALPHA-THALASSEMIA (HCC): ICD-10-CM

## 2024-02-16 DIAGNOSIS — J45.30 MILD PERSISTENT ASTHMA WITHOUT COMPLICATION: ICD-10-CM

## 2024-02-16 PROCEDURE — G8420 CALC BMI NORM PARAMETERS: HCPCS | Performed by: FAMILY MEDICINE

## 2024-02-16 PROCEDURE — 1090F PRES/ABSN URINE INCON ASSESS: CPT | Performed by: FAMILY MEDICINE

## 2024-02-16 PROCEDURE — 1036F TOBACCO NON-USER: CPT | Performed by: FAMILY MEDICINE

## 2024-02-16 PROCEDURE — G8399 PT W/DXA RESULTS DOCUMENT: HCPCS | Performed by: FAMILY MEDICINE

## 2024-02-16 PROCEDURE — G0009 ADMIN PNEUMOCOCCAL VACCINE: HCPCS | Performed by: FAMILY MEDICINE

## 2024-02-16 PROCEDURE — 90677 PCV20 VACCINE IM: CPT | Performed by: FAMILY MEDICINE

## 2024-02-16 PROCEDURE — 3079F DIAST BP 80-89 MM HG: CPT | Performed by: FAMILY MEDICINE

## 2024-02-16 PROCEDURE — 1123F ACP DISCUSS/DSCN MKR DOCD: CPT | Performed by: FAMILY MEDICINE

## 2024-02-16 PROCEDURE — G8427 DOCREV CUR MEDS BY ELIG CLIN: HCPCS | Performed by: FAMILY MEDICINE

## 2024-02-16 PROCEDURE — G8484 FLU IMMUNIZE NO ADMIN: HCPCS | Performed by: FAMILY MEDICINE

## 2024-02-16 PROCEDURE — 3017F COLORECTAL CA SCREEN DOC REV: CPT | Performed by: FAMILY MEDICINE

## 2024-02-16 PROCEDURE — 99214 OFFICE O/P EST MOD 30 MIN: CPT | Performed by: FAMILY MEDICINE

## 2024-02-16 PROCEDURE — 3075F SYST BP GE 130 - 139MM HG: CPT | Performed by: FAMILY MEDICINE

## 2024-02-16 RX ORDER — ROSUVASTATIN CALCIUM 10 MG/1
10 TABLET, COATED ORAL DAILY
Qty: 90 TABLET | Refills: 0 | Status: SHIPPED | OUTPATIENT
Start: 2024-02-16

## 2024-02-16 RX ORDER — CHOLECALCIFEROL (VITAMIN D3) 25 MCG
CAPSULE ORAL
Qty: 90 CAPSULE | Refills: 3 | Status: SHIPPED | OUTPATIENT
Start: 2024-02-16

## 2024-02-16 RX ORDER — AMLODIPINE BESYLATE 5 MG/1
5 TABLET ORAL EVERY EVENING
Qty: 90 TABLET | Refills: 0 | Status: SHIPPED | OUTPATIENT
Start: 2024-02-16

## 2024-02-16 RX ORDER — MULTIVITAMIN WITH FOLIC ACID 400 MCG
TABLET ORAL
Qty: 90 TABLET | Refills: 3 | Status: SHIPPED | OUTPATIENT
Start: 2024-02-16

## 2024-02-16 RX ORDER — ENALAPRIL MALEATE 5 MG/1
5 TABLET ORAL EVERY MORNING
Qty: 90 TABLET | Refills: 0 | Status: SHIPPED | OUTPATIENT
Start: 2024-02-16

## 2024-02-16 RX ORDER — MIRTAZAPINE 7.5 MG/1
TABLET, FILM COATED ORAL
Qty: 90 TABLET | Refills: 0 | Status: SHIPPED | OUTPATIENT
Start: 2024-02-16

## 2024-02-16 RX ORDER — ALBUTEROL SULFATE 90 UG/1
AEROSOL, METERED RESPIRATORY (INHALATION)
Qty: 1 EACH | Refills: 1 | Status: SHIPPED | OUTPATIENT
Start: 2024-02-16

## 2024-02-16 RX ORDER — LEVOTHYROXINE SODIUM 0.07 MG/1
75 TABLET ORAL DAILY
Qty: 90 TABLET | Refills: 0 | Status: SHIPPED | OUTPATIENT
Start: 2024-02-16

## 2024-02-16 RX ORDER — ESCITALOPRAM OXALATE 10 MG/1
10 TABLET ORAL DAILY
Qty: 90 TABLET | Refills: 0 | Status: SHIPPED | OUTPATIENT
Start: 2024-02-16

## 2024-02-16 NOTE — PROGRESS NOTES
2/16/2024    Barby Urias is a 68 y.o. female here for   Chief Complaint   Patient presents with    Hypertension     Alpha thallasemia has been stable. She follows with hematology. Just had labs done recently which were reviewed. Note hemoglobin >13.  See scanned media.  Primary issue lately related to housing. She is in the process of moving. Was able to find an apartment that is safe and has sufficient space. She had been having securtiy concerns with her more recent landlord as well as exposure to smoke and other substances that had been aggravated her asthma in preivous living space.   No falls. She has gained weight. Stress level improved.   She is getting adjusted to using the new inhaler which is a different device thn previous. When she didn't use it she did note some audible wheezing while walking. On that day had neglected to bring her albuterol inhaler. No current symptoms.    Regarding hypertension. Patient is not monitoring home blood pressures.   Cardiovascular risk factors: advanced age (older than 55 for men, 65 for women), dyslipidemia, and hypertension.  Patient does not smoke.  Currently on amlodipie and enalapril. Taking as prescribed. No adverse effects.    Today,  BP: 135/80 and she is asymptomatic.  BP Readings from Last 3 Encounters:   02/16/24 135/80   11/02/23 124/72   08/24/23 (!) 150/60     Patient denies chest pain, diaphoresis, dyspnea, dyspnea on exertion, peripheral edema, palpitations, headache, vision changes.      Wt Readings from Last 3 Encounters:   02/16/24 65 kg (143 lb 4.8 oz)   11/02/23 60.8 kg (134 lb)   08/24/23 60.3 kg (133 lb)       She  reports that she quit smoking about 6 years ago. Her smoking use included cigarettes. She started smoking about 11 years ago. She has a 0.5 pack-year smoking history. She has never used smokeless tobacco.    Medications and allergies reviewed and updated in chart.    Current Outpatient Medications   Medication Sig Dispense Refill

## 2024-03-16 DIAGNOSIS — J45.30 MILD PERSISTENT ASTHMA WITHOUT COMPLICATION: ICD-10-CM

## 2024-03-19 RX ORDER — ALBUTEROL SULFATE 90 UG/1
AEROSOL, METERED RESPIRATORY (INHALATION)
Qty: 8.5 EACH | Refills: 1 | Status: SHIPPED | OUTPATIENT
Start: 2024-03-19

## 2024-04-11 ENCOUNTER — TELEPHONE (OUTPATIENT)
Dept: FAMILY MEDICINE CLINIC | Age: 69
End: 2024-04-11

## 2024-04-11 DIAGNOSIS — Z12.31 ENCOUNTER FOR SCREENING MAMMOGRAM FOR MALIGNANT NEOPLASM OF BREAST: Primary | ICD-10-CM

## 2024-04-30 ENCOUNTER — HOSPITAL ENCOUNTER (OUTPATIENT)
Dept: GENERAL RADIOLOGY | Age: 69
Discharge: HOME | End: 2024-05-02
Payer: MEDICARE

## 2024-04-30 VITALS — BODY MASS INDEX: 25.16 KG/M2 | HEIGHT: 63 IN | WEIGHT: 142 LBS

## 2024-04-30 DIAGNOSIS — Z12.31 ENCOUNTER FOR SCREENING MAMMOGRAM FOR MALIGNANT NEOPLASM OF BREAST: ICD-10-CM

## 2024-04-30 PROCEDURE — 77063 BREAST TOMOSYNTHESIS BI: CPT

## 2024-05-30 ENCOUNTER — OFFICE VISIT (OUTPATIENT)
Dept: FAMILY MEDICINE CLINIC | Age: 69
End: 2024-05-30
Payer: MEDICARE

## 2024-05-30 VITALS
BODY MASS INDEX: 23.57 KG/M2 | DIASTOLIC BLOOD PRESSURE: 86 MMHG | OXYGEN SATURATION: 96 % | HEART RATE: 67 BPM | RESPIRATION RATE: 16 BRPM | SYSTOLIC BLOOD PRESSURE: 155 MMHG | HEIGHT: 63 IN | TEMPERATURE: 97.6 F | WEIGHT: 133 LBS

## 2024-05-30 DIAGNOSIS — Z00.00 MEDICARE ANNUAL WELLNESS VISIT, SUBSEQUENT: Primary | ICD-10-CM

## 2024-05-30 DIAGNOSIS — I10 PRIMARY HYPERTENSION: ICD-10-CM

## 2024-05-30 DIAGNOSIS — F32.89 OTHER DEPRESSION: ICD-10-CM

## 2024-05-30 PROCEDURE — 1123F ACP DISCUSS/DSCN MKR DOCD: CPT | Performed by: FAMILY MEDICINE

## 2024-05-30 PROCEDURE — 3017F COLORECTAL CA SCREEN DOC REV: CPT | Performed by: FAMILY MEDICINE

## 2024-05-30 PROCEDURE — G0439 PPPS, SUBSEQ VISIT: HCPCS | Performed by: FAMILY MEDICINE

## 2024-05-30 PROCEDURE — 3079F DIAST BP 80-89 MM HG: CPT | Performed by: FAMILY MEDICINE

## 2024-05-30 PROCEDURE — 3077F SYST BP >= 140 MM HG: CPT | Performed by: FAMILY MEDICINE

## 2024-05-30 RX ORDER — AMLODIPINE BESYLATE 5 MG/1
5 TABLET ORAL EVERY EVENING
Qty: 90 TABLET | Refills: 0 | Status: SHIPPED | OUTPATIENT
Start: 2024-05-30

## 2024-05-30 RX ORDER — ESCITALOPRAM OXALATE 10 MG/1
10 TABLET ORAL DAILY
Qty: 90 TABLET | Refills: 0 | Status: SHIPPED | OUTPATIENT
Start: 2024-05-30

## 2024-05-30 RX ORDER — ENALAPRIL MALEATE 5 MG/1
5 TABLET ORAL EVERY MORNING
Qty: 90 TABLET | Refills: 0 | Status: SHIPPED | OUTPATIENT
Start: 2024-05-30

## 2024-05-30 RX ORDER — MIRTAZAPINE 7.5 MG/1
TABLET, FILM COATED ORAL
Qty: 90 TABLET | Refills: 0 | Status: SHIPPED | OUTPATIENT
Start: 2024-05-30

## 2024-05-30 RX ORDER — ROSUVASTATIN CALCIUM 10 MG/1
10 TABLET, COATED ORAL DAILY
Qty: 90 TABLET | Refills: 0 | Status: SHIPPED | OUTPATIENT
Start: 2024-05-30

## 2024-05-30 RX ORDER — LEVOTHYROXINE SODIUM 0.07 MG/1
75 TABLET ORAL DAILY
Qty: 90 TABLET | Refills: 0 | Status: SHIPPED | OUTPATIENT
Start: 2024-05-30

## 2024-05-30 ASSESSMENT — PATIENT HEALTH QUESTIONNAIRE - PHQ9
8. MOVING OR SPEAKING SO SLOWLY THAT OTHER PEOPLE COULD HAVE NOTICED. OR THE OPPOSITE, BEING SO FIGETY OR RESTLESS THAT YOU HAVE BEEN MOVING AROUND A LOT MORE THAN USUAL: NOT AT ALL
SUM OF ALL RESPONSES TO PHQ QUESTIONS 1-9: 2
6. FEELING BAD ABOUT YOURSELF - OR THAT YOU ARE A FAILURE OR HAVE LET YOURSELF OR YOUR FAMILY DOWN: NOT AT ALL
6. FEELING BAD ABOUT YOURSELF - OR THAT YOU ARE A FAILURE OR HAVE LET YOURSELF OR YOUR FAMILY DOWN: NOT AT ALL
3. TROUBLE FALLING OR STAYING ASLEEP: NOT AT ALL
5. POOR APPETITE OR OVEREATING: SEVERAL DAYS
9. THOUGHTS THAT YOU WOULD BE BETTER OFF DEAD, OR OF HURTING YOURSELF: NOT AT ALL
8. MOVING OR SPEAKING SO SLOWLY THAT OTHER PEOPLE COULD HAVE NOTICED. OR THE OPPOSITE, BEING SO FIGETY OR RESTLESS THAT YOU HAVE BEEN MOVING AROUND A LOT MORE THAN USUAL: NOT AT ALL
SUM OF ALL RESPONSES TO PHQ QUESTIONS 1-9: 2
SUM OF ALL RESPONSES TO PHQ9 QUESTIONS 1 & 2: 0
1. LITTLE INTEREST OR PLEASURE IN DOING THINGS: NOT AT ALL
3. TROUBLE FALLING OR STAYING ASLEEP: NOT AT ALL
SUM OF ALL RESPONSES TO PHQ QUESTIONS 1-9: 1
SUM OF ALL RESPONSES TO PHQ QUESTIONS 1-9: 2
SUM OF ALL RESPONSES TO PHQ QUESTIONS 1-9: 2
9. THOUGHTS THAT YOU WOULD BE BETTER OFF DEAD, OR OF HURTING YOURSELF: NOT AT ALL
3. TROUBLE FALLING OR STAYING ASLEEP: NOT AT ALL
SUM OF ALL RESPONSES TO PHQ QUESTIONS 1-9: 1
5. POOR APPETITE OR OVEREATING: SEVERAL DAYS
1. LITTLE INTEREST OR PLEASURE IN DOING THINGS: NOT AT ALL
2. FEELING DOWN, DEPRESSED OR HOPELESS: NOT AT ALL
2. FEELING DOWN, DEPRESSED OR HOPELESS: NOT AT ALL
SUM OF ALL RESPONSES TO PHQ QUESTIONS 1-9: 2
4. FEELING TIRED OR HAVING LITTLE ENERGY: NOT AT ALL
SUM OF ALL RESPONSES TO PHQ9 QUESTIONS 1 & 2: 0
SUM OF ALL RESPONSES TO PHQ QUESTIONS 1-9: 2
10. IF YOU CHECKED OFF ANY PROBLEMS, HOW DIFFICULT HAVE THESE PROBLEMS MADE IT FOR YOU TO DO YOUR WORK, TAKE CARE OF THINGS AT HOME, OR GET ALONG WITH OTHER PEOPLE: NOT DIFFICULT AT ALL
SUM OF ALL RESPONSES TO PHQ QUESTIONS 1-9: 1
SUM OF ALL RESPONSES TO PHQ QUESTIONS 1-9: 2
1. LITTLE INTEREST OR PLEASURE IN DOING THINGS: SEVERAL DAYS
SUM OF ALL RESPONSES TO PHQ QUESTIONS 1-9: 1
SUM OF ALL RESPONSES TO PHQ QUESTIONS 1-9: 2
4. FEELING TIRED OR HAVING LITTLE ENERGY: NOT AT ALL
7. TROUBLE CONCENTRATING ON THINGS, SUCH AS READING THE NEWSPAPER OR WATCHING TELEVISION: SEVERAL DAYS
7. TROUBLE CONCENTRATING ON THINGS, SUCH AS READING THE NEWSPAPER OR WATCHING TELEVISION: SEVERAL DAYS

## 2024-05-30 ASSESSMENT — LIFESTYLE VARIABLES: HOW OFTEN DO YOU HAVE A DRINK CONTAINING ALCOHOL: NEVER

## 2024-05-30 NOTE — PROGRESS NOTES
Medicare Annual Wellness Visit    Barby Urias is here for Medicare AWV    Assessment & Plan   Medicare annual wellness visit, subsequent  Discussed fit testing  Feeling overwhlemed right now due to recent move  Doesn't wish to make any changes.   Primary hypertension  Bp over goal on repeat. Recent stressors  She has new home bp cuff and has someone coming in tomorrow ot assist her with use  -     amLODIPine (NORVASC) 5 MG tablet; Take 1 tablet by mouth every evening, Disp-90 tablet, R-0Normal  -     enalapril (VASOTEC) 5 MG tablet; Take 1 tablet by mouth every morning, Disp-90 tablet, R-0Normal  Other depression  -     escitalopram (LEXAPRO) 10 MG tablet; Take 1 tablet by mouth daily, Disp-90 tablet, R-0Normal    Recommendations for Preventive Services Due: see orders and patient instructions/AVS.  Recommended screening schedule for the next 5-10 years is provided to the patient in written form: see Patient Instructions/AVS.     Return in about 6 weeks (around 7/11/2024) for bp check.     Subjective   The following acute and/or chronic problems were also addressed today:  Regarding chronic conditions  HTN - on amlodipine and enalapril 5 mg daily  HLD - on rosuvastatin  Migraines  ASthma - no recent flares, still able to exercise, using inhalers as prescribed  Hypothyroidism   Lab Results   Component Value Date    TSH 2.45 11/02/2023       Dysthymia - on lexapro 10 mg daily  Colon cancer screening is due  Vaccines - rsv, shingles, covid are due    She  just moved to a new apartment  Having some issues getting things together, also issues regarding security deposit and overpaying previous landlord  She has a new blood pressure cuff  Wt Readings from Last 3 Encounters:   05/30/24 60.3 kg (133 lb)   04/30/24 64.4 kg (142 lb)   02/16/24 65 kg (143 lb 4.8 oz)     Her weight is down  Moving has been stressful   Packing a lot   Eating fast food  Feeling like she I smore settled now.    Patient's complete Health Risk

## 2024-07-11 ENCOUNTER — OFFICE VISIT (OUTPATIENT)
Dept: FAMILY MEDICINE CLINIC | Age: 69
End: 2024-07-11
Payer: MEDICARE

## 2024-07-11 VITALS
HEART RATE: 57 BPM | TEMPERATURE: 97.1 F | DIASTOLIC BLOOD PRESSURE: 74 MMHG | SYSTOLIC BLOOD PRESSURE: 164 MMHG | WEIGHT: 132 LBS | OXYGEN SATURATION: 94 % | HEIGHT: 63 IN | BODY MASS INDEX: 23.39 KG/M2 | RESPIRATION RATE: 16 BRPM

## 2024-07-11 DIAGNOSIS — F32.A DEPRESSION, UNSPECIFIED DEPRESSION TYPE: ICD-10-CM

## 2024-07-11 DIAGNOSIS — Z65.8 PSYCHOSOCIAL STRESSORS: ICD-10-CM

## 2024-07-11 DIAGNOSIS — I10 UNCONTROLLED HYPERTENSION: Primary | ICD-10-CM

## 2024-07-11 PROCEDURE — 1090F PRES/ABSN URINE INCON ASSESS: CPT | Performed by: FAMILY MEDICINE

## 2024-07-11 PROCEDURE — 3078F DIAST BP <80 MM HG: CPT | Performed by: FAMILY MEDICINE

## 2024-07-11 PROCEDURE — G8420 CALC BMI NORM PARAMETERS: HCPCS | Performed by: FAMILY MEDICINE

## 2024-07-11 PROCEDURE — G8399 PT W/DXA RESULTS DOCUMENT: HCPCS | Performed by: FAMILY MEDICINE

## 2024-07-11 PROCEDURE — 1123F ACP DISCUSS/DSCN MKR DOCD: CPT | Performed by: FAMILY MEDICINE

## 2024-07-11 PROCEDURE — 3017F COLORECTAL CA SCREEN DOC REV: CPT | Performed by: FAMILY MEDICINE

## 2024-07-11 PROCEDURE — 99213 OFFICE O/P EST LOW 20 MIN: CPT | Performed by: FAMILY MEDICINE

## 2024-07-11 PROCEDURE — 1036F TOBACCO NON-USER: CPT | Performed by: FAMILY MEDICINE

## 2024-07-11 PROCEDURE — G8427 DOCREV CUR MEDS BY ELIG CLIN: HCPCS | Performed by: FAMILY MEDICINE

## 2024-07-11 PROCEDURE — 3077F SYST BP >= 140 MM HG: CPT | Performed by: FAMILY MEDICINE

## 2024-07-11 NOTE — PROGRESS NOTES
7/11/2024    Barby Urias is a 68 y.o. female here for   Chief Complaint   Patient presents with    Hypertension     Regarding hypertension. Patient is  monitoring home blood pressures.   Cardiovascular risk factors: advanced age (older than 55 for men, 65 for women), dyslipidemia, and hypertension.  Patient does not smoke.  Currently on enalapril and amlodipine. Taking as prescribed. No adverse effects.    Today,  BP: (!) 164/74 (manual) and she is asymptomatic.  BP Readings from Last 3 Encounters:   07/11/24 (!) 164/74   05/30/24 (!) 155/86   02/16/24 135/80     Patient denies chest pain, diaphoresis, dyspnea, dyspnea on exertion, peripheral edema, palpitations, headache, vision changes.  She has not felt like herself lately. Made a statement that she would not leave the [facility] euntil everything was set up.   She has had financial stressors related to security deposit and overpayment in rent o previous landlord.   She is having difficulty getting things set up the way she wants it.   She is caring for her grandson a few days a week.      Wt Readings from Last 3 Encounters:   07/11/24 59.9 kg (132 lb)   05/30/24 60.3 kg (133 lb)   04/30/24 64.4 kg (142 lb)       She  reports that she quit smoking about 6 years ago. Her smoking use included cigarettes. She started smoking about 11 years ago. She has a 0.5 pack-year smoking history. She has never used smokeless tobacco.    Medications and allergies reviewed and updated in chart.    Current Outpatient Medications   Medication Sig Dispense Refill    amLODIPine (NORVASC) 5 MG tablet Take 1 tablet by mouth every evening 90 tablet 0    enalapril (VASOTEC) 5 MG tablet Take 1 tablet by mouth every morning 90 tablet 0    escitalopram (LEXAPRO) 10 MG tablet Take 1 tablet by mouth daily 90 tablet 0    levothyroxine (SYNTHROID) 75 MCG tablet Take 1 tablet by mouth daily 90 tablet 0    mirtazapine (REMERON) 7.5 MG tablet TAKE 1 TABLET BY MOUTH NIGHTLY FOR INSOMNIA 90 tablet 0

## 2024-07-25 ENCOUNTER — OFFICE VISIT (OUTPATIENT)
Dept: FAMILY MEDICINE CLINIC | Age: 69
End: 2024-07-25
Payer: MEDICARE

## 2024-07-25 VITALS
OXYGEN SATURATION: 96 % | HEART RATE: 65 BPM | RESPIRATION RATE: 16 BRPM | HEIGHT: 63 IN | BODY MASS INDEX: 23.71 KG/M2 | TEMPERATURE: 97.6 F | SYSTOLIC BLOOD PRESSURE: 154 MMHG | DIASTOLIC BLOOD PRESSURE: 80 MMHG | WEIGHT: 133.82 LBS

## 2024-07-25 DIAGNOSIS — I10 PRIMARY HYPERTENSION: ICD-10-CM

## 2024-07-25 PROCEDURE — G8420 CALC BMI NORM PARAMETERS: HCPCS | Performed by: FAMILY MEDICINE

## 2024-07-25 PROCEDURE — G8427 DOCREV CUR MEDS BY ELIG CLIN: HCPCS | Performed by: FAMILY MEDICINE

## 2024-07-25 PROCEDURE — 3017F COLORECTAL CA SCREEN DOC REV: CPT | Performed by: FAMILY MEDICINE

## 2024-07-25 PROCEDURE — 1123F ACP DISCUSS/DSCN MKR DOCD: CPT | Performed by: FAMILY MEDICINE

## 2024-07-25 PROCEDURE — 3077F SYST BP >= 140 MM HG: CPT | Performed by: FAMILY MEDICINE

## 2024-07-25 PROCEDURE — G8399 PT W/DXA RESULTS DOCUMENT: HCPCS | Performed by: FAMILY MEDICINE

## 2024-07-25 PROCEDURE — 3079F DIAST BP 80-89 MM HG: CPT | Performed by: FAMILY MEDICINE

## 2024-07-25 PROCEDURE — 1036F TOBACCO NON-USER: CPT | Performed by: FAMILY MEDICINE

## 2024-07-25 PROCEDURE — 99213 OFFICE O/P EST LOW 20 MIN: CPT | Performed by: FAMILY MEDICINE

## 2024-07-25 PROCEDURE — 1090F PRES/ABSN URINE INCON ASSESS: CPT | Performed by: FAMILY MEDICINE

## 2024-07-25 RX ORDER — ENALAPRIL MALEATE 10 MG/1
10 TABLET ORAL EVERY MORNING
Qty: 90 TABLET | Refills: 0 | Status: SHIPPED | OUTPATIENT
Start: 2024-07-25

## 2024-07-25 NOTE — PROGRESS NOTES
7/25/2024    Barby Urias is a 68 y.o. female here for   Chief Complaint   Patient presents with    Hypertension    Blood Pressure Check   Here for htn f/u  She has been having her sister check her home blood pressures  However she did not bring in a blood pressure log  Her sister was concerend about her blood pressure. Unable to state numbers. Just stated that she knew that it needed to be addressed and she was worried. It did seem to be improved. Her siste rhad been checking her blood pressure twice a day.   Continues to relate her higher bp to recent move. She has been taking amlodipine and enalapril but has been very reluctant to take any more medications.    Ongoing financial stressors related to security deposit, etc.    BP Readings from Last 3 Encounters:   07/25/24 (!) 144/83   07/11/24 (!) 164/74   05/30/24 (!) 155/86       Wt Readings from Last 3 Encounters:   07/25/24 60.7 kg (133 lb 13.1 oz)   07/11/24 59.9 kg (132 lb)   05/30/24 60.3 kg (133 lb)       She  reports that she quit smoking about 6 years ago. Her smoking use included cigarettes. She started smoking about 11 years ago. She has a 0.5 pack-year smoking history. She has never used smokeless tobacco.    Medications and allergies reviewed and updated in chart.    Current Outpatient Medications   Medication Sig Dispense Refill    amLODIPine (NORVASC) 5 MG tablet Take 1 tablet by mouth every evening 90 tablet 0    enalapril (VASOTEC) 5 MG tablet Take 1 tablet by mouth every morning 90 tablet 0    escitalopram (LEXAPRO) 10 MG tablet Take 1 tablet by mouth daily 90 tablet 0    levothyroxine (SYNTHROID) 75 MCG tablet Take 1 tablet by mouth daily 90 tablet 0    mirtazapine (REMERON) 7.5 MG tablet TAKE 1 TABLET BY MOUTH NIGHTLY FOR INSOMNIA 90 tablet 0    rosuvastatin (CRESTOR) 10 MG tablet Take 1 tablet by mouth daily 90 tablet 0    albuterol sulfate HFA (PROVENTIL;VENTOLIN;PROAIR) 108 (90 Base) MCG/ACT inhaler INHALE 2 PUFFS BY MOUTH EVERY 6 HOUR AS

## 2024-08-27 RX ORDER — ROSUVASTATIN CALCIUM 10 MG/1
10 TABLET, COATED ORAL DAILY
Qty: 90 TABLET | Refills: 0 | Status: SHIPPED | OUTPATIENT
Start: 2024-08-27

## 2024-09-14 DIAGNOSIS — F32.89 OTHER DEPRESSION: ICD-10-CM

## 2024-09-15 DIAGNOSIS — I10 PRIMARY HYPERTENSION: ICD-10-CM

## 2024-09-17 RX ORDER — AMLODIPINE BESYLATE 5 MG/1
5 TABLET ORAL EVERY EVENING
Qty: 90 TABLET | Refills: 0 | Status: SHIPPED | OUTPATIENT
Start: 2024-09-17

## 2024-09-17 RX ORDER — MIRTAZAPINE 7.5 MG/1
TABLET, FILM COATED ORAL
Qty: 90 TABLET | Refills: 0 | Status: SHIPPED | OUTPATIENT
Start: 2024-09-17

## 2024-09-17 RX ORDER — LEVOTHYROXINE SODIUM 75 UG/1
75 TABLET ORAL DAILY
Qty: 90 TABLET | Refills: 0 | Status: SHIPPED | OUTPATIENT
Start: 2024-09-17

## 2024-09-17 RX ORDER — ESCITALOPRAM OXALATE 10 MG/1
10 TABLET ORAL DAILY
Qty: 90 TABLET | Refills: 0 | Status: SHIPPED | OUTPATIENT
Start: 2024-09-17

## 2024-10-03 ENCOUNTER — OFFICE VISIT (OUTPATIENT)
Dept: FAMILY MEDICINE CLINIC | Age: 69
End: 2024-10-03
Payer: MEDICARE

## 2024-10-03 VITALS
BODY MASS INDEX: 23.57 KG/M2 | HEART RATE: 69 BPM | RESPIRATION RATE: 16 BRPM | HEIGHT: 63 IN | DIASTOLIC BLOOD PRESSURE: 74 MMHG | OXYGEN SATURATION: 96 % | SYSTOLIC BLOOD PRESSURE: 137 MMHG | TEMPERATURE: 97.4 F | WEIGHT: 133 LBS

## 2024-10-03 DIAGNOSIS — I10 UNCONTROLLED HYPERTENSION: ICD-10-CM

## 2024-10-03 DIAGNOSIS — Z12.11 SCREEN FOR COLON CANCER: ICD-10-CM

## 2024-10-03 DIAGNOSIS — I10 PRIMARY HYPERTENSION: Primary | ICD-10-CM

## 2024-10-03 DIAGNOSIS — Z23 NEED FOR INFLUENZA VACCINATION: ICD-10-CM

## 2024-10-03 LAB
ALBUMIN: 4.2 G/DL (ref 3.5–5.2)
ALP BLD-CCNC: 104 U/L (ref 35–104)
ALT SERPL-CCNC: 7 U/L (ref 0–32)
ANION GAP SERPL CALCULATED.3IONS-SCNC: 11 MMOL/L (ref 7–16)
AST SERPL-CCNC: 14 U/L (ref 0–31)
BASOPHILS ABSOLUTE: 0.03 K/UL (ref 0–0.2)
BASOPHILS RELATIVE PERCENT: 1 % (ref 0–2)
BILIRUB SERPL-MCNC: 0.4 MG/DL (ref 0–1.2)
BUN BLDV-MCNC: 7 MG/DL (ref 6–23)
CALCIUM SERPL-MCNC: 9.3 MG/DL (ref 8.6–10.2)
CHLORIDE BLD-SCNC: 105 MMOL/L (ref 98–107)
CHOLESTEROL, TOTAL: 219 MG/DL
CO2: 28 MMOL/L (ref 22–29)
CREAT SERPL-MCNC: 0.7 MG/DL (ref 0.5–1)
EOSINOPHILS ABSOLUTE: 0.14 K/UL (ref 0.05–0.5)
EOSINOPHILS RELATIVE PERCENT: 2 % (ref 0–6)
GFR, ESTIMATED: >90 ML/MIN/1.73M2
GLUCOSE BLD-MCNC: 89 MG/DL (ref 74–99)
HCT VFR BLD CALC: 41.8 % (ref 34–48)
HDLC SERPL-MCNC: 63 MG/DL
HEMOGLOBIN: 13.2 G/DL (ref 11.5–15.5)
IMMATURE GRANULOCYTES %: 1 % (ref 0–5)
IMMATURE GRANULOCYTES ABSOLUTE: 0.03 K/UL (ref 0–0.58)
LDL CHOLESTEROL: 142 MG/DL
LYMPHOCYTES ABSOLUTE: 1.81 K/UL (ref 1.5–4)
LYMPHOCYTES RELATIVE PERCENT: 30 % (ref 20–42)
MCH RBC QN AUTO: 25.3 PG (ref 26–35)
MCHC RBC AUTO-ENTMCNC: 31.6 G/DL (ref 32–34.5)
MCV RBC AUTO: 80.1 FL (ref 80–99.9)
MONOCYTES ABSOLUTE: 0.57 K/UL (ref 0.1–0.95)
MONOCYTES RELATIVE PERCENT: 9 % (ref 2–12)
NEUTROPHILS ABSOLUTE: 3.5 K/UL (ref 1.8–7.3)
NEUTROPHILS RELATIVE PERCENT: 58 % (ref 43–80)
PDW BLD-RTO: 14.6 % (ref 11.5–15)
PLATELET # BLD: 280 K/UL (ref 130–450)
PMV BLD AUTO: 10.8 FL (ref 7–12)
POTASSIUM SERPL-SCNC: 3.8 MMOL/L (ref 3.5–5)
RBC # BLD: 5.22 M/UL (ref 3.5–5.5)
SODIUM BLD-SCNC: 144 MMOL/L (ref 132–146)
TOTAL PROTEIN: 7.9 G/DL (ref 6.4–8.3)
TRIGL SERPL-MCNC: 70 MG/DL
VLDLC SERPL CALC-MCNC: 14 MG/DL
WBC # BLD: 6.1 K/UL (ref 4.5–11.5)

## 2024-10-03 PROCEDURE — 99214 OFFICE O/P EST MOD 30 MIN: CPT | Performed by: FAMILY MEDICINE

## 2024-10-03 PROCEDURE — 36415 COLL VENOUS BLD VENIPUNCTURE: CPT | Performed by: FAMILY MEDICINE

## 2024-10-03 PROCEDURE — G8482 FLU IMMUNIZE ORDER/ADMIN: HCPCS | Performed by: FAMILY MEDICINE

## 2024-10-03 PROCEDURE — 3075F SYST BP GE 130 - 139MM HG: CPT | Performed by: FAMILY MEDICINE

## 2024-10-03 PROCEDURE — 1090F PRES/ABSN URINE INCON ASSESS: CPT | Performed by: FAMILY MEDICINE

## 2024-10-03 PROCEDURE — 1036F TOBACCO NON-USER: CPT | Performed by: FAMILY MEDICINE

## 2024-10-03 PROCEDURE — 3078F DIAST BP <80 MM HG: CPT | Performed by: FAMILY MEDICINE

## 2024-10-03 PROCEDURE — G8420 CALC BMI NORM PARAMETERS: HCPCS | Performed by: FAMILY MEDICINE

## 2024-10-03 PROCEDURE — 3017F COLORECTAL CA SCREEN DOC REV: CPT | Performed by: FAMILY MEDICINE

## 2024-10-03 PROCEDURE — G8399 PT W/DXA RESULTS DOCUMENT: HCPCS | Performed by: FAMILY MEDICINE

## 2024-10-03 PROCEDURE — 1123F ACP DISCUSS/DSCN MKR DOCD: CPT | Performed by: FAMILY MEDICINE

## 2024-10-03 PROCEDURE — G8427 DOCREV CUR MEDS BY ELIG CLIN: HCPCS | Performed by: FAMILY MEDICINE

## 2024-10-03 RX ORDER — AMLODIPINE BESYLATE 10 MG/1
10 TABLET ORAL DAILY
Qty: 90 TABLET | Refills: 1 | Status: SHIPPED | OUTPATIENT
Start: 2024-10-03

## 2024-10-03 RX ORDER — ROSUVASTATIN CALCIUM 20 MG/1
20 TABLET, COATED ORAL DAILY
Qty: 90 TABLET | Refills: 0 | Status: SHIPPED | OUTPATIENT
Start: 2024-10-03

## 2024-10-03 RX ORDER — ROSUVASTATIN CALCIUM 10 MG/1
20 TABLET, COATED ORAL DAILY
Qty: 90 TABLET | Refills: 0 | Status: SHIPPED
Start: 2024-10-03 | End: 2024-10-03

## 2024-10-03 SDOH — ECONOMIC STABILITY: FOOD INSECURITY: WITHIN THE PAST 12 MONTHS, YOU WORRIED THAT YOUR FOOD WOULD RUN OUT BEFORE YOU GOT MONEY TO BUY MORE.: NEVER TRUE

## 2024-10-03 SDOH — ECONOMIC STABILITY: FOOD INSECURITY: WITHIN THE PAST 12 MONTHS, THE FOOD YOU BOUGHT JUST DIDN'T LAST AND YOU DIDN'T HAVE MONEY TO GET MORE.: NEVER TRUE

## 2024-10-03 SDOH — ECONOMIC STABILITY: INCOME INSECURITY: HOW HARD IS IT FOR YOU TO PAY FOR THE VERY BASICS LIKE FOOD, HOUSING, MEDICAL CARE, AND HEATING?: NOT VERY HARD

## 2024-10-03 NOTE — PROGRESS NOTES
10/3/2024    Barby Urias is a 69 y.o. female here for   Chief Complaint   Patient presents with    Hypertension    Headache     She continues to settle in to her new apartment.    She is c/o headaache  She thinks that she may need more glasses    Regarding hypertension. Patient is not monitoring home blood pressures.   Cardiovascular risk factors: advanced age (older than 55 for men, 65 for women), dyslipidemia, and hypertension.  Patient does not smoke.  Currently on amlodipine 5 mg , enalapril 10 mg dialy . Taking as prescribed. No adverse effects.    Today,  BP: 137/74 and she is asymptomatic.  BP Readings from Last 3 Encounters:   10/03/24 137/74   07/25/24 (!) 154/80   07/11/24 (!) 164/74     Patient denies chest pain, diaphoresis, dyspnea, dyspnea on exertion, peripheral edema, palpitations, headache, vision changes.    Lab Results   Component Value Date    CHOL 221 (H) 11/02/2023     Lab Results   Component Value Date    TRIG 73 11/02/2023     Lab Results   Component Value Date    HDL 75 11/02/2023     No components found for: \"LDLCHOLESTEROL\", \"LDLCALC\"  Lab Results   Component Value Date    VLDL 15 11/02/2023       The ASCVD Risk score (Lina SCHAFFER, et al., 2019) failed to calculate for the following reasons:    The patient has a prior MI or stroke diagnosis        Wt Readings from Last 3 Encounters:   10/03/24 60.3 kg (133 lb)   07/25/24 60.7 kg (133 lb 13.1 oz)   07/11/24 59.9 kg (132 lb)       She  reports that she quit smoking about 6 years ago. Her smoking use included cigarettes. She started smoking about 11 years ago. She has a 0.5 pack-year smoking history. She has never used smokeless tobacco.    Medications and allergies reviewed and updated in chart.    Current Outpatient Medications   Medication Sig Dispense Refill    mirtazapine (REMERON) 7.5 MG tablet TAKE 1 TABLET BY MOUTH NIGHTLY FOR INSOMNIA 90 tablet 0    escitalopram (LEXAPRO) 10 MG tablet TAKE 1 TABLET BY MOUTH EVERY DAY 90 tablet 0

## 2024-10-04 PROCEDURE — 90653 IIV ADJUVANT VACCINE IM: CPT | Performed by: FAMILY MEDICINE

## 2024-10-04 PROCEDURE — G0008 ADMIN INFLUENZA VIRUS VAC: HCPCS | Performed by: FAMILY MEDICINE

## 2024-10-25 DIAGNOSIS — I10 PRIMARY HYPERTENSION: ICD-10-CM

## 2024-10-25 DIAGNOSIS — F32.89 OTHER DEPRESSION: ICD-10-CM

## 2024-10-28 ENCOUNTER — TELEPHONE (OUTPATIENT)
Dept: FAMILY MEDICINE CLINIC | Age: 69
End: 2024-10-28

## 2024-10-28 RX ORDER — ROSUVASTATIN CALCIUM 10 MG/1
10 TABLET, COATED ORAL DAILY
Qty: 90 TABLET | Refills: 1 | OUTPATIENT
Start: 2024-10-28

## 2024-10-28 NOTE — TELEPHONE ENCOUNTER
----- Message from Jessica MEJIA sent at 10/28/2024  3:20 PM EDT -----  Regarding: claudia  rosuvastatin (CRESTOR) 20 MG tablet [[id number]]    Order Details  Dose: 20 mg Route: Oral Frequency: DAILY  Dispense Quantity: 90 tablet Refills: 0        Sig: Take 1 tablet by mouth daily       Start Date: 10/03/24 End Date: --  Written Date: 10/03/24 Expiration Date: 10/03/25  Original Order: rosuvastatin (CRESTOR) 10 MG tablet [[id number]]  Providers    Authorizing Provider: Christa Ayala MD NPI: [id number]  Ordering User: Christa Ayala MD        Pharmacy    [id number] - [place], [place] - [address] - P [phone] - F [phone]  [facility] [place] [address]  Phone: [phone]  Fax: [phone]    Order Class:    Order Class

## 2024-10-28 NOTE — TELEPHONE ENCOUNTER
Detailed message left for patient informing her that the pharmacy confirmed they delivered this medication on/around 10/7/2024 to her home for a 90-day supply. Asked that she contact them with any questions or concerns.

## 2024-10-28 NOTE — TELEPHONE ENCOUNTER
Name of Medication(s) Requested:  Requested Prescriptions     Pending Prescriptions Disp Refills    enalapril (VASOTEC) 5 MG tablet [Pharmacy Med Name: ENALAPRIL MALEATE 5 MG TABLET] 90 tablet 1     Sig: TAKE 1 TABLET BY MOUTH EVERY DAY IN THE MORNING    escitalopram (LEXAPRO) 10 MG tablet [Pharmacy Med Name: ESCITALOPRAM 10 MG TABLET] 90 tablet 1     Sig: TAKE 1 TABLET BY MOUTH EVERY DAY    mirtazapine (REMERON) 7.5 MG tablet [Pharmacy Med Name: MIRTAZAPINE 7.5 MG TABLET] 90 tablet 1     Sig: TAKE 1 TABLET BY MOUTH NIGHTLY FOR INSOMNIA    rosuvastatin (CRESTOR) 20 MG tablet [Pharmacy Med Name: ROSUVASTATIN CALCIUM 20 MG TAB] 90 tablet 1     Sig: TAKE 1 TABLET BY MOUTH EVERY DAY    levothyroxine (SYNTHROID) 75 MCG tablet [Pharmacy Med Name: LEVOTHYROXINE 75 MCG TABLET] 90 tablet 1     Sig: TAKE 1 TABLET BY MOUTH EVERY DAY     Refused Prescriptions Disp Refills    rosuvastatin (CRESTOR) 10 MG tablet [Pharmacy Med Name: ROSUVASTATIN CALCIUM 10 MG TAB] 90 tablet 0     Sig: TAKE 1 TABLET BY MOUTH EVERY DAY       Medication is on current medication list Yes    Dosage and directions were verified? Yes    Quantity verified: 90 day supply     Pharmacy Verified?  Yes    Last Appointment:  10/3/2024    Future appts:  Future Appointments   Date Time Provider Department Center   11/21/2024 11:00 AM Christa Ayala MD [facility]        (If no appt send self scheduling link. .REFILLAPPT)  Scheduling request sent?     [] Yes  [x] No    Does patient need updated?  [] Yes  [x] No

## 2024-10-29 RX ORDER — ENALAPRIL MALEATE 5 MG/1
5 TABLET ORAL EVERY MORNING
Qty: 90 TABLET | Refills: 1 | Status: SHIPPED | OUTPATIENT
Start: 2024-10-29

## 2024-10-29 RX ORDER — LEVOTHYROXINE SODIUM 75 UG/1
75 TABLET ORAL DAILY
Qty: 90 TABLET | Refills: 1 | Status: SHIPPED | OUTPATIENT
Start: 2024-10-29

## 2024-10-29 RX ORDER — ROSUVASTATIN CALCIUM 20 MG/1
20 TABLET, COATED ORAL DAILY
Qty: 90 TABLET | Refills: 1 | Status: SHIPPED | OUTPATIENT
Start: 2024-10-29

## 2024-10-29 RX ORDER — ESCITALOPRAM OXALATE 10 MG/1
10 TABLET ORAL DAILY
Qty: 90 TABLET | Refills: 1 | Status: SHIPPED | OUTPATIENT
Start: 2024-10-29

## 2024-10-29 RX ORDER — MIRTAZAPINE 7.5 MG/1
TABLET, FILM COATED ORAL
Qty: 90 TABLET | Refills: 1 | Status: SHIPPED | OUTPATIENT
Start: 2024-10-29

## 2024-11-21 ENCOUNTER — OFFICE VISIT (OUTPATIENT)
Dept: FAMILY MEDICINE CLINIC | Age: 69
End: 2024-11-21
Payer: MEDICARE

## 2024-11-21 ENCOUNTER — TELEPHONE (OUTPATIENT)
Dept: FAMILY MEDICINE CLINIC | Age: 69
End: 2024-11-21

## 2024-11-21 VITALS
HEART RATE: 68 BPM | SYSTOLIC BLOOD PRESSURE: 164 MMHG | RESPIRATION RATE: 16 BRPM | WEIGHT: 135 LBS | HEIGHT: 63 IN | OXYGEN SATURATION: 97 % | TEMPERATURE: 97.3 F | DIASTOLIC BLOOD PRESSURE: 77 MMHG | BODY MASS INDEX: 23.92 KG/M2

## 2024-11-21 DIAGNOSIS — Z12.11 SCREEN FOR COLON CANCER: ICD-10-CM

## 2024-11-21 DIAGNOSIS — I10 PRIMARY HYPERTENSION: ICD-10-CM

## 2024-11-21 LAB
CONTROL: NORMAL
FECAL BLOOD IMMUNOCHEMICAL TEST: NEGATIVE

## 2024-11-21 PROCEDURE — G8399 PT W/DXA RESULTS DOCUMENT: HCPCS | Performed by: FAMILY MEDICINE

## 2024-11-21 PROCEDURE — 1036F TOBACCO NON-USER: CPT | Performed by: FAMILY MEDICINE

## 2024-11-21 PROCEDURE — 1159F MED LIST DOCD IN RCRD: CPT | Performed by: FAMILY MEDICINE

## 2024-11-21 PROCEDURE — G8427 DOCREV CUR MEDS BY ELIG CLIN: HCPCS | Performed by: FAMILY MEDICINE

## 2024-11-21 PROCEDURE — 82274 ASSAY TEST FOR BLOOD FECAL: CPT | Performed by: FAMILY MEDICINE

## 2024-11-21 PROCEDURE — 3078F DIAST BP <80 MM HG: CPT | Performed by: FAMILY MEDICINE

## 2024-11-21 PROCEDURE — 1123F ACP DISCUSS/DSCN MKR DOCD: CPT | Performed by: FAMILY MEDICINE

## 2024-11-21 PROCEDURE — 3077F SYST BP >= 140 MM HG: CPT | Performed by: FAMILY MEDICINE

## 2024-11-21 PROCEDURE — G8420 CALC BMI NORM PARAMETERS: HCPCS | Performed by: FAMILY MEDICINE

## 2024-11-21 PROCEDURE — 1090F PRES/ABSN URINE INCON ASSESS: CPT | Performed by: FAMILY MEDICINE

## 2024-11-21 PROCEDURE — G8482 FLU IMMUNIZE ORDER/ADMIN: HCPCS | Performed by: FAMILY MEDICINE

## 2024-11-21 PROCEDURE — 3017F COLORECTAL CA SCREEN DOC REV: CPT | Performed by: FAMILY MEDICINE

## 2024-11-21 PROCEDURE — 99213 OFFICE O/P EST LOW 20 MIN: CPT | Performed by: FAMILY MEDICINE

## 2024-11-21 RX ORDER — ENALAPRIL MALEATE 10 MG/1
10 TABLET ORAL DAILY
Qty: 30 TABLET | Refills: 1 | Status: SHIPPED | OUTPATIENT
Start: 2024-11-21

## 2024-11-21 NOTE — TELEPHONE ENCOUNTER
Please contact pharmacy and confirm with them which medications for high blood pressure patient has filled  She was unsure of her dose of enalapril - did not have bottles available for review  Let andrews light
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
## 2024-12-05 ENCOUNTER — OFFICE VISIT (OUTPATIENT)
Dept: FAMILY MEDICINE CLINIC | Age: 69
End: 2024-12-05
Payer: MEDICARE

## 2024-12-05 VITALS
DIASTOLIC BLOOD PRESSURE: 81 MMHG | RESPIRATION RATE: 16 BRPM | SYSTOLIC BLOOD PRESSURE: 157 MMHG | TEMPERATURE: 97.3 F | HEART RATE: 78 BPM | BODY MASS INDEX: 23.74 KG/M2 | WEIGHT: 134 LBS | OXYGEN SATURATION: 96 % | HEIGHT: 63 IN

## 2024-12-05 DIAGNOSIS — I10 PRIMARY HYPERTENSION: Primary | ICD-10-CM

## 2024-12-05 PROCEDURE — G8420 CALC BMI NORM PARAMETERS: HCPCS | Performed by: FAMILY MEDICINE

## 2024-12-05 PROCEDURE — 1159F MED LIST DOCD IN RCRD: CPT | Performed by: FAMILY MEDICINE

## 2024-12-05 PROCEDURE — 1160F RVW MEDS BY RX/DR IN RCRD: CPT | Performed by: FAMILY MEDICINE

## 2024-12-05 PROCEDURE — 3079F DIAST BP 80-89 MM HG: CPT | Performed by: FAMILY MEDICINE

## 2024-12-05 PROCEDURE — 1036F TOBACCO NON-USER: CPT | Performed by: FAMILY MEDICINE

## 2024-12-05 PROCEDURE — 1123F ACP DISCUSS/DSCN MKR DOCD: CPT | Performed by: FAMILY MEDICINE

## 2024-12-05 PROCEDURE — 3017F COLORECTAL CA SCREEN DOC REV: CPT | Performed by: FAMILY MEDICINE

## 2024-12-05 PROCEDURE — 99213 OFFICE O/P EST LOW 20 MIN: CPT | Performed by: FAMILY MEDICINE

## 2024-12-05 PROCEDURE — G8482 FLU IMMUNIZE ORDER/ADMIN: HCPCS | Performed by: FAMILY MEDICINE

## 2024-12-05 PROCEDURE — G8427 DOCREV CUR MEDS BY ELIG CLIN: HCPCS | Performed by: FAMILY MEDICINE

## 2024-12-05 PROCEDURE — 1090F PRES/ABSN URINE INCON ASSESS: CPT | Performed by: FAMILY MEDICINE

## 2024-12-05 PROCEDURE — 3077F SYST BP >= 140 MM HG: CPT | Performed by: FAMILY MEDICINE

## 2024-12-05 PROCEDURE — G8399 PT W/DXA RESULTS DOCUMENT: HCPCS | Performed by: FAMILY MEDICINE

## 2024-12-05 RX ORDER — HYDROCHLOROTHIAZIDE 25 MG/1
25 TABLET ORAL EVERY MORNING
Qty: 30 TABLET | Refills: 1 | Status: SHIPPED | OUTPATIENT
Start: 2024-12-05

## 2024-12-05 NOTE — PROGRESS NOTES
12/5/2024    Barby Urias is a 69 y.o. female here for   Chief Complaint   Patient presents with    Hypertension    Medication Check     Regarding hypertension. Patient is not monitoring home blood pressures.   Cardiovascular risk factors: advanced age (older than 55 for men, 65 for women), dyslipidemia, and hypertension.  Patient does not smoke.  Currently on enalapril and amlodipine. Taking as prescribed. No adverse effects.    Today,  BP: (!) 157/81 and she is asymptomatic.  BP Readings from Last 3 Encounters:   12/05/24 (!) 157/81   11/21/24 (!) 164/77   10/03/24 137/74     She is having daily headaches  No longer seeing neurology for migraines - off nurtec, off fiorinal. Previously followed with Dr Bronson.   She is worried about taking too many medications and is reluctant to start a new medication  She is taking care of her grandson, who was diagnosed with autism. She is frustrated by her daughters approach to him. She often has a headache after caring for him.      Wt Readings from Last 3 Encounters:   12/05/24 60.8 kg (134 lb)   11/21/24 61.2 kg (135 lb)   10/03/24 60.3 kg (133 lb)       She  reports that she quit smoking about 6 years ago. Her smoking use included cigarettes. She started smoking about 11 years ago. She has a 0.5 pack-year smoking history. She has never used smokeless tobacco.    Medications and allergies reviewed and updated in chart.    Current Outpatient Medications   Medication Sig Dispense Refill    enalapril (VASOTEC) 10 MG tablet Take 1 tablet by mouth daily 30 tablet 1    escitalopram (LEXAPRO) 10 MG tablet TAKE 1 TABLET BY MOUTH EVERY DAY 90 tablet 1    mirtazapine (REMERON) 7.5 MG tablet TAKE 1 TABLET BY MOUTH NIGHTLY FOR INSOMNIA 90 tablet 1    rosuvastatin (CRESTOR) 20 MG tablet TAKE 1 TABLET BY MOUTH EVERY DAY 90 tablet 1    levothyroxine (SYNTHROID) 75 MCG tablet TAKE 1 TABLET BY MOUTH EVERY DAY 90 tablet 1    amLODIPine (NORVASC) 10 MG tablet Take 1 tablet by mouth daily 90

## 2024-12-06 ENCOUNTER — TELEPHONE (OUTPATIENT)
Dept: FAMILY MEDICINE CLINIC | Age: 69
End: 2024-12-06

## 2024-12-13 RX ORDER — ENALAPRIL MALEATE 10 MG/1
10 TABLET ORAL DAILY
Qty: 90 TABLET | Refills: 1 | OUTPATIENT
Start: 2024-12-13

## 2024-12-19 ENCOUNTER — OFFICE VISIT (OUTPATIENT)
Dept: FAMILY MEDICINE CLINIC | Age: 69
End: 2024-12-19
Payer: MEDICARE

## 2024-12-19 VITALS
HEIGHT: 63 IN | WEIGHT: 136 LBS | OXYGEN SATURATION: 96 % | TEMPERATURE: 97.5 F | HEART RATE: 64 BPM | BODY MASS INDEX: 24.1 KG/M2 | RESPIRATION RATE: 16 BRPM | SYSTOLIC BLOOD PRESSURE: 137 MMHG | DIASTOLIC BLOOD PRESSURE: 77 MMHG

## 2024-12-19 DIAGNOSIS — I10 PRIMARY HYPERTENSION: Primary | ICD-10-CM

## 2024-12-19 DIAGNOSIS — M25.512 CHRONIC LEFT SHOULDER PAIN: ICD-10-CM

## 2024-12-19 DIAGNOSIS — G89.29 CHRONIC LEFT SHOULDER PAIN: ICD-10-CM

## 2024-12-19 PROCEDURE — G8420 CALC BMI NORM PARAMETERS: HCPCS | Performed by: FAMILY MEDICINE

## 2024-12-19 PROCEDURE — 1036F TOBACCO NON-USER: CPT | Performed by: FAMILY MEDICINE

## 2024-12-19 PROCEDURE — 1160F RVW MEDS BY RX/DR IN RCRD: CPT | Performed by: FAMILY MEDICINE

## 2024-12-19 PROCEDURE — G8482 FLU IMMUNIZE ORDER/ADMIN: HCPCS | Performed by: FAMILY MEDICINE

## 2024-12-19 PROCEDURE — G8427 DOCREV CUR MEDS BY ELIG CLIN: HCPCS | Performed by: FAMILY MEDICINE

## 2024-12-19 PROCEDURE — 1123F ACP DISCUSS/DSCN MKR DOCD: CPT | Performed by: FAMILY MEDICINE

## 2024-12-19 PROCEDURE — 1159F MED LIST DOCD IN RCRD: CPT | Performed by: FAMILY MEDICINE

## 2024-12-19 PROCEDURE — 3078F DIAST BP <80 MM HG: CPT | Performed by: FAMILY MEDICINE

## 2024-12-19 PROCEDURE — 99213 OFFICE O/P EST LOW 20 MIN: CPT | Performed by: FAMILY MEDICINE

## 2024-12-19 PROCEDURE — 3075F SYST BP GE 130 - 139MM HG: CPT | Performed by: FAMILY MEDICINE

## 2024-12-19 PROCEDURE — 1090F PRES/ABSN URINE INCON ASSESS: CPT | Performed by: FAMILY MEDICINE

## 2024-12-19 PROCEDURE — 3017F COLORECTAL CA SCREEN DOC REV: CPT | Performed by: FAMILY MEDICINE

## 2024-12-19 PROCEDURE — G8399 PT W/DXA RESULTS DOCUMENT: HCPCS | Performed by: FAMILY MEDICINE

## 2024-12-19 RX ORDER — ENALAPRIL MALEATE AND HYDROCHLOROTHIAZIDE 10; 25 MG/1; MG/1
1 TABLET ORAL DAILY
Qty: 90 TABLET | Refills: 0 | Status: SHIPPED | OUTPATIENT
Start: 2024-12-19

## 2024-12-19 NOTE — PATIENT INSTRUCTIONS
We are combining your enalapril 10 mg and hydrochlorothiazide 25 mg which you have been taking separately , into a combination of the two.      Now you will be on enalapril hct 10/25 and amlodipine 10 mg for your blood pressure.

## 2024-12-19 NOTE — PROGRESS NOTES
12/19/2024    Barby Urias is a 69 y.o. female here for   Chief Complaint   Patient presents with   • Hypertension     Regarding hypertension. Patient is not monitoring home blood pressures.   Cardiovascular risk factors: advanced age (older than 55 for men, 65 for women), dyslipidemia, and hypertension.  Patient does not smoke.  Currently on enalapril 10 mg, hctz 25 mg , amlodipine 10 mg daily. Taking as prescribed. No adverse effects.    Today,  BP: 137/77 and she is asymptomatic.  BP Readings from Last 3 Encounters:   12/19/24 137/77   12/05/24 (!) 157/81   11/21/24 (!) 164/77     Patient denies chest pain, diaphoresis, dyspnea, dyspnea on exertion, peripheral edema, palpitations, headache, vision changes.      Wt Readings from Last 3 Encounters:   12/19/24 61.7 kg (136 lb)   12/05/24 60.8 kg (134 lb)   11/21/24 61.2 kg (135 lb)       She  reports that she quit smoking about 6 years ago. Her smoking use included cigarettes. She started smoking about 11 years ago. She has a 0.5 pack-year smoking history. She has never used smokeless tobacco.    Medications and allergies reviewed and updated in chart.    Current Outpatient Medications   Medication Sig Dispense Refill   • hydroCHLOROthiazide (HYDRODIURIL) 25 MG tablet Take 1 tablet by mouth every morning 30 tablet 1   • enalapril (VASOTEC) 10 MG tablet Take 1 tablet by mouth daily 30 tablet 1   • escitalopram (LEXAPRO) 10 MG tablet TAKE 1 TABLET BY MOUTH EVERY DAY 90 tablet 1   • mirtazapine (REMERON) 7.5 MG tablet TAKE 1 TABLET BY MOUTH NIGHTLY FOR INSOMNIA 90 tablet 1   • rosuvastatin (CRESTOR) 20 MG tablet TAKE 1 TABLET BY MOUTH EVERY DAY 90 tablet 1   • levothyroxine (SYNTHROID) 75 MCG tablet TAKE 1 TABLET BY MOUTH EVERY DAY 90 tablet 1   • amLODIPine (NORVASC) 10 MG tablet Take 1 tablet by mouth daily 90 tablet 1   • albuterol sulfate HFA (PROVENTIL;VENTOLIN;PROAIR) 108 (90 Base) MCG/ACT inhaler INHALE 2 PUFFS BY MOUTH EVERY 6 HOUR AS NEEDED FOR SHORTNESS OF

## 2025-01-13 NOTE — TELEPHONE ENCOUNTER
Name of Medication(s) Requested:  Requested Prescriptions     Pending Prescriptions Disp Refills    enalapril (VASOTEC) 10 MG tablet [Pharmacy Med Name: ENALAPRIL MALEATE 10 MG TAB] 30 tablet 1     Sig: TAKE 1 TABLET BY MOUTH EVERY DAY       Medication is on current medication list Yes    Dosage and directions were verified? Yes    Quantity verified: 30 day supply     Pharmacy Verified?  Yes    Last Appointment:  12/19/2024    Future appts:  Future Appointments   Date Time Provider Department Center   3/20/2025 10:45 AM Christa Ayala MD [facility]        (If no appt send self scheduling link. .REFILLAPPT)  Scheduling request sent?     [] Yes  [x] No    Does patient need updated?  [] Yes  [x] No

## 2025-01-14 DIAGNOSIS — I10 PRIMARY HYPERTENSION: ICD-10-CM

## 2025-01-14 RX ORDER — ENALAPRIL MALEATE AND HYDROCHLOROTHIAZIDE 10; 25 MG/1; MG/1
1 TABLET ORAL DAILY
Qty: 90 TABLET | Refills: 0 | Status: SHIPPED | OUTPATIENT
Start: 2025-01-14

## 2025-01-14 RX ORDER — ENALAPRIL MALEATE 10 MG/1
10 TABLET ORAL DAILY
Qty: 30 TABLET | Refills: 1 | OUTPATIENT
Start: 2025-01-14

## 2025-01-27 RX ORDER — HYDROCHLOROTHIAZIDE 25 MG/1
25 TABLET ORAL EVERY MORNING
Qty: 30 TABLET | Refills: 1 | OUTPATIENT
Start: 2025-01-27

## 2025-02-10 RX ORDER — ENALAPRIL MALEATE 10 MG/1
10 TABLET ORAL DAILY
Qty: 30 TABLET | Refills: 1 | OUTPATIENT
Start: 2025-02-10

## 2025-03-15 DIAGNOSIS — G43.109 MIGRAINE WITH AURA AND WITHOUT STATUS MIGRAINOSUS, NOT INTRACTABLE: Chronic | ICD-10-CM

## 2025-03-17 NOTE — TELEPHONE ENCOUNTER
Name of Medication(s) Requested:  Requested Prescriptions     Pending Prescriptions Disp Refills    Multiple Vitamin (DAILY-RAVEN MULTIVITAMIN) TABS [Pharmacy Med Name: DAILY-RAVEN TABLET] 90 tablet 3     Sig: TAKE 1 TABLET BY MOUTH EVERY DAY       Medication is on current medication list Yes    Dosage and directions were verified? Yes    Quantity verified: 90 day supply     Pharmacy Verified?  Yes    Last Appointment:  12/19/2024    Future appts:  Future Appointments   Date Time Provider Department Center   3/20/2025 10:45 AM Christa Ayala MD [facility]        (If no appt send self scheduling link. .REFILLAPPT)  Scheduling request sent?     [] Yes  [x] No    Does patient need updated?  [x] Yes  [] No

## 2025-03-18 RX ORDER — MULTIVITAMIN WITH FOLIC ACID 400 MCG
1 TABLET ORAL DAILY
Qty: 90 TABLET | Refills: 3 | Status: SHIPPED | OUTPATIENT
Start: 2025-03-18

## 2025-03-20 ENCOUNTER — OFFICE VISIT (OUTPATIENT)
Dept: FAMILY MEDICINE CLINIC | Age: 70
End: 2025-03-20
Payer: MEDICARE

## 2025-03-20 VITALS
SYSTOLIC BLOOD PRESSURE: 138 MMHG | TEMPERATURE: 97.3 F | DIASTOLIC BLOOD PRESSURE: 79 MMHG | WEIGHT: 139 LBS | HEIGHT: 63 IN | OXYGEN SATURATION: 97 % | RESPIRATION RATE: 15 BRPM | BODY MASS INDEX: 24.63 KG/M2 | HEART RATE: 73 BPM

## 2025-03-20 DIAGNOSIS — I10 PRIMARY HYPERTENSION: Primary | ICD-10-CM

## 2025-03-20 PROCEDURE — G8420 CALC BMI NORM PARAMETERS: HCPCS | Performed by: FAMILY MEDICINE

## 2025-03-20 PROCEDURE — G8427 DOCREV CUR MEDS BY ELIG CLIN: HCPCS | Performed by: FAMILY MEDICINE

## 2025-03-20 PROCEDURE — G8399 PT W/DXA RESULTS DOCUMENT: HCPCS | Performed by: FAMILY MEDICINE

## 2025-03-20 PROCEDURE — 1123F ACP DISCUSS/DSCN MKR DOCD: CPT | Performed by: FAMILY MEDICINE

## 2025-03-20 PROCEDURE — 3075F SYST BP GE 130 - 139MM HG: CPT | Performed by: FAMILY MEDICINE

## 2025-03-20 PROCEDURE — 1159F MED LIST DOCD IN RCRD: CPT | Performed by: FAMILY MEDICINE

## 2025-03-20 PROCEDURE — 1090F PRES/ABSN URINE INCON ASSESS: CPT | Performed by: FAMILY MEDICINE

## 2025-03-20 PROCEDURE — 1160F RVW MEDS BY RX/DR IN RCRD: CPT | Performed by: FAMILY MEDICINE

## 2025-03-20 PROCEDURE — 3078F DIAST BP <80 MM HG: CPT | Performed by: FAMILY MEDICINE

## 2025-03-20 PROCEDURE — 1036F TOBACCO NON-USER: CPT | Performed by: FAMILY MEDICINE

## 2025-03-20 PROCEDURE — 3017F COLORECTAL CA SCREEN DOC REV: CPT | Performed by: FAMILY MEDICINE

## 2025-03-20 PROCEDURE — 99213 OFFICE O/P EST LOW 20 MIN: CPT | Performed by: FAMILY MEDICINE

## 2025-03-20 RX ORDER — ENALAPRIL MALEATE AND HYDROCHLOROTHIAZIDE 10; 25 MG/1; MG/1
1 TABLET ORAL DAILY
Qty: 90 TABLET | Refills: 0 | Status: SHIPPED | OUTPATIENT
Start: 2025-03-20

## 2025-03-20 RX ORDER — HYDROCHLOROTHIAZIDE 25 MG/1
25 TABLET ORAL EVERY MORNING
COMMUNITY
Start: 2025-01-01 | End: 2025-03-20 | Stop reason: ALTCHOICE

## 2025-03-20 RX ORDER — FEXOFENADINE HCL 180 MG
TABLET ORAL
Qty: 90 CAPSULE | Refills: 3 | Status: SHIPPED | OUTPATIENT
Start: 2025-03-20

## 2025-03-20 RX ORDER — AMLODIPINE BESYLATE 10 MG/1
10 TABLET ORAL DAILY
Qty: 90 TABLET | Refills: 1 | Status: SHIPPED | OUTPATIENT
Start: 2025-03-20

## 2025-03-20 SDOH — ECONOMIC STABILITY: FOOD INSECURITY: WITHIN THE PAST 12 MONTHS, THE FOOD YOU BOUGHT JUST DIDN'T LAST AND YOU DIDN'T HAVE MONEY TO GET MORE.: NEVER TRUE

## 2025-03-20 SDOH — ECONOMIC STABILITY: FOOD INSECURITY: WITHIN THE PAST 12 MONTHS, YOU WORRIED THAT YOUR FOOD WOULD RUN OUT BEFORE YOU GOT MONEY TO BUY MORE.: NEVER TRUE

## 2025-03-20 ASSESSMENT — PATIENT HEALTH QUESTIONNAIRE - PHQ9
1. LITTLE INTEREST OR PLEASURE IN DOING THINGS: NOT AT ALL
7. TROUBLE CONCENTRATING ON THINGS, SUCH AS READING THE NEWSPAPER OR WATCHING TELEVISION: NOT AT ALL
5. POOR APPETITE OR OVEREATING: NOT AT ALL
SUM OF ALL RESPONSES TO PHQ QUESTIONS 1-9: 1
3. TROUBLE FALLING OR STAYING ASLEEP: SEVERAL DAYS
8. MOVING OR SPEAKING SO SLOWLY THAT OTHER PEOPLE COULD HAVE NOTICED. OR THE OPPOSITE, BEING SO FIGETY OR RESTLESS THAT YOU HAVE BEEN MOVING AROUND A LOT MORE THAN USUAL: NOT AT ALL
SUM OF ALL RESPONSES TO PHQ QUESTIONS 1-9: 1
6. FEELING BAD ABOUT YOURSELF - OR THAT YOU ARE A FAILURE OR HAVE LET YOURSELF OR YOUR FAMILY DOWN: NOT AT ALL
SUM OF ALL RESPONSES TO PHQ QUESTIONS 1-9: 1
4. FEELING TIRED OR HAVING LITTLE ENERGY: NOT AT ALL
2. FEELING DOWN, DEPRESSED OR HOPELESS: NOT AT ALL
SUM OF ALL RESPONSES TO PHQ QUESTIONS 1-9: 1
9. THOUGHTS THAT YOU WOULD BE BETTER OFF DEAD, OR OF HURTING YOURSELF: NOT AT ALL

## 2025-03-20 NOTE — PROGRESS NOTES
vitamin D ([facility] D3) 25 MCG (1000 UT) CAPS; TAKE 1 CAPSULE DAILY AS DIRECTED    Other orders  -     amLODIPine (NORVASC) 10 MG tablet; Take 1 tablet by mouth daily  -     psyllium (KONSYL) 28.3 % PACK; Take 1 packet by mouth daily        Return in about 3 months (around 6/20/2025) for medicare wellness.    Advised patient to call with any new medication issues. All questions answered.  Call or go to emergency department ifsymptoms worsen or persist.

## 2025-04-10 ENCOUNTER — TELEPHONE (OUTPATIENT)
Dept: FAMILY MEDICINE CLINIC | Age: 70
End: 2025-04-10

## 2025-04-10 DIAGNOSIS — Z12.31 ENCOUNTER FOR SCREENING MAMMOGRAM FOR MALIGNANT NEOPLASM OF BREAST: Primary | ICD-10-CM

## 2025-04-10 NOTE — TELEPHONE ENCOUNTER
Pt got letter from [facility]isel C[facility]denas that she's due for mammogram. Calling to request order be placed & then let her know when order is in so she can call to schedule.

## 2025-04-22 NOTE — PROGRESS NOTES
Is the patient currently in the state of [place]? YES    Current patient location: Patient declined to provide     Visit mode:Video    If the visit is dropped, the patient can be reconnected by: VIDEO VISIT: Text to cell phone:   Telephone Information:   Mobile [phone]       Will anyone else be joining the visit? No  (If patient encounters technical issues they should call [phone])    Are changes needed to the allergy or medication list? No    Are refills needed on medications prescribed by this physician? Discuss with Provider    Rooming Documentation: Questionnaire(s) completed.    Reason for visit: RECHAVERY Pierson          
goals and will be educated on HEP and provided written handouts as needed throughout their care. Compliance to HEP in integral geoffrey a successful outcome. Patient shows fair understanding of HEP and will need reeducated for proper tech and form to successfully rehab shoulder. Improved shoulder ROM today by 15'       -Rehab Potential: Good -Requires OT Follow Up: Yes    Treatment limitations/Activity Tolerance:  [x] Patient tolerated treatment well       [] Patient limited by fatigue  [] Patient limited by pain                     [] Patient limited by other medical complications  [] Other:     Patient. Education:  [x] Plans/Goals, Risks/Benefits discussed  [x] HEP      Method of Education: [x] Verbal  [x] Demo  [] Written  Comprehension of Education:  [x] Verbalizes understanding. [x] Demonstrates fair understanding. [x] Needs Review at next visit and advanced as per protocol. [] Demonstrates/verbalizes understanding of HEP/Ed previously given. PLAN:   [x]  Continues Plan of care: Treatment delivered based on POC and graduated to patient's progress. Patient education continues at each visit to obtain maximum benefit from skilled OT intervention.     []  Alter Plan of care:     []  Discharge:    Billing: TIME IN: 2 TIME OUT: 3  TOTAL TREATMENT TIME: 55  TOTAL TIME:60  CODE  TODAY MINUTES TODAY UNITS    73134 Fluidotherapy      20501 Manual 25 2    88481 Therapeutic Ex 30 2    16529 Therapeutic Activity      59012 ADL/COMP Tech Train      78160 Neuromuscular Re-Ed      61503 Ortho Management Training      51735 Paraffin      08215 Ultrasound       Other:                     TOTAL 55 1883 [address], [facility] [place],  [place], [address]OTA

## 2025-05-14 ENCOUNTER — HOSPITAL ENCOUNTER (OUTPATIENT)
Dept: GENERAL RADIOLOGY | Age: 70
Discharge: HOME | End: 2025-05-16
Payer: MEDICARE

## 2025-05-14 VITALS — WEIGHT: 144 LBS | BODY MASS INDEX: 25.52 KG/M2 | HEIGHT: 63 IN

## 2025-05-14 DIAGNOSIS — Z12.31 ENCOUNTER FOR SCREENING MAMMOGRAM FOR MALIGNANT NEOPLASM OF BREAST: ICD-10-CM

## 2025-05-14 PROCEDURE — 77063 BREAST TOMOSYNTHESIS BI: CPT

## 2025-05-23 ENCOUNTER — TELEPHONE (OUTPATIENT)
Dept: FAMILY MEDICINE CLINIC | Age: 70
End: 2025-05-23

## 2025-05-23 NOTE — TELEPHONE ENCOUNTER
Patient calling to request an alternative for \"Psyllium 28.3% Packet\". She states the tried to pick it up earlier this week and the pharmacy told her they do not manufacture it anymore.

## 2025-05-23 NOTE — TELEPHONE ENCOUNTER
Please call pharmacy and let them know that any over the counter psyllium product is acceptable as long as it is not high in sugar.    Please substitute per verbal order

## 2025-05-29 DIAGNOSIS — F32.89 OTHER DEPRESSION: ICD-10-CM

## 2025-05-29 RX ORDER — LEVOTHYROXINE SODIUM 75 UG/1
75 TABLET ORAL DAILY
Qty: 90 TABLET | Refills: 1 | Status: SHIPPED | OUTPATIENT
Start: 2025-05-29

## 2025-05-29 RX ORDER — ESCITALOPRAM OXALATE 10 MG/1
10 TABLET ORAL DAILY
Qty: 90 TABLET | Refills: 1 | Status: SHIPPED | OUTPATIENT
Start: 2025-05-29

## 2025-05-29 RX ORDER — MIRTAZAPINE 7.5 MG/1
TABLET, FILM COATED ORAL
Qty: 90 TABLET | Refills: 1 | Status: SHIPPED | OUTPATIENT
Start: 2025-05-29

## 2025-05-29 NOTE — TELEPHONE ENCOUNTER
Name of Medication(s) Requested:  Requested Prescriptions     Pending Prescriptions Disp Refills    levothyroxine (SYNTHROID) 75 MCG tablet [Pharmacy Med Name: LEVOTHYROXINE 75 MCG TABLET] 90 tablet 1     Sig: TAKE 1 TABLET BY MOUTH EVERY DAY    escitalopram (LEXAPRO) 10 MG tablet [Pharmacy Med Name: ESCITALOPRAM 10 MG TABLET] 90 tablet 1     Sig: TAKE 1 TABLET BY MOUTH EVERY DAY    mirtazapine (REMERON) 7.5 MG tablet [Pharmacy Med Name: MIRTAZAPINE 7.5 MG TABLET] 90 tablet 1     Sig: TAKE 1 TABLET BY MOUTH NIGHTLY FOR INSOMNIA       Medication is on current medication list Yes    Dosage and directions were verified? Yes    Quantity verified: 90 day supply     Pharmacy Verified?  Yes    Last Appointment:  3/20/2025    Future appts:  Future Appointments   Date Time Provider Department Center   7/17/2025 10:30 AM Christa Ayala MD [facility] DE[facility]        (If no appt send self scheduling link. .REFILLAPPT)  Scheduling request sent?     [] Yes  [x] No    Does patient need updated?  [x] Yes  [] No

## 2025-06-16 NOTE — TELEPHONE ENCOUNTER
Name of Medication(s) Requested:  Requested Prescriptions     Pending Prescriptions Disp Refills    rosuvastatin (CRESTOR) 20 MG tablet [Pharmacy Med Name: ROSUVASTATIN CALCIUM 20 MG TAB] 90 tablet 1     Sig: TAKE 1 TABLET BY MOUTH EVERY DAY       Medication is on current medication list Yes    Dosage and directions were verified? Yes    Quantity verified: 90 day supply     Pharmacy Verified?  Yes    Last Appointment:  3/20/2025    Future appts:  Future Appointments   Date Time Provider Department Center   7/17/2025 10:30 AM Christa Ayala MD [facility]        (If no appt send self scheduling link. .REFILLAPPT)  Scheduling request sent?     [] Yes  [x] No    Does patient need updated?  [x] Yes  [] No

## 2025-06-17 RX ORDER — ROSUVASTATIN CALCIUM 20 MG/1
20 TABLET, COATED ORAL DAILY
Qty: 90 TABLET | Refills: 1 | Status: SHIPPED | OUTPATIENT
Start: 2025-06-17

## 2025-07-17 ENCOUNTER — OFFICE VISIT (OUTPATIENT)
Dept: FAMILY MEDICINE CLINIC | Age: 70
End: 2025-07-17
Payer: MEDICARE

## 2025-07-17 VITALS
RESPIRATION RATE: 15 BRPM | BODY MASS INDEX: 25.69 KG/M2 | OXYGEN SATURATION: 98 % | WEIGHT: 145 LBS | HEIGHT: 63 IN | HEART RATE: 71 BPM | TEMPERATURE: 97.2 F | SYSTOLIC BLOOD PRESSURE: 136 MMHG | DIASTOLIC BLOOD PRESSURE: 80 MMHG

## 2025-07-17 DIAGNOSIS — Z91.81 AT MODERATE RISK FOR FALL: ICD-10-CM

## 2025-07-17 DIAGNOSIS — Z00.00 MEDICARE ANNUAL WELLNESS VISIT, SUBSEQUENT: Primary | ICD-10-CM

## 2025-07-17 DIAGNOSIS — I10 PRIMARY HYPERTENSION: ICD-10-CM

## 2025-07-17 PROCEDURE — 1159F MED LIST DOCD IN RCRD: CPT | Performed by: FAMILY MEDICINE

## 2025-07-17 PROCEDURE — 3079F DIAST BP 80-89 MM HG: CPT | Performed by: FAMILY MEDICINE

## 2025-07-17 PROCEDURE — 1123F ACP DISCUSS/DSCN MKR DOCD: CPT | Performed by: FAMILY MEDICINE

## 2025-07-17 PROCEDURE — 3075F SYST BP GE 130 - 139MM HG: CPT | Performed by: FAMILY MEDICINE

## 2025-07-17 PROCEDURE — G0439 PPPS, SUBSEQ VISIT: HCPCS | Performed by: FAMILY MEDICINE

## 2025-07-17 PROCEDURE — 3017F COLORECTAL CA SCREEN DOC REV: CPT | Performed by: FAMILY MEDICINE

## 2025-07-17 RX ORDER — ENALAPRIL MALEATE AND HYDROCHLOROTHIAZIDE 10; 25 MG/1; MG/1
1 TABLET ORAL DAILY
Qty: 90 TABLET | Refills: 0 | Status: SHIPPED | OUTPATIENT
Start: 2025-07-17

## 2025-07-17 ASSESSMENT — PATIENT HEALTH QUESTIONNAIRE - PHQ9
SUM OF ALL RESPONSES TO PHQ QUESTIONS 1-9: 1
6. FEELING BAD ABOUT YOURSELF - OR THAT YOU ARE A FAILURE OR HAVE LET YOURSELF OR YOUR FAMILY DOWN: NOT AT ALL
SUM OF ALL RESPONSES TO PHQ QUESTIONS 1-9: 1
5. POOR APPETITE OR OVEREATING: NOT AT ALL
8. MOVING OR SPEAKING SO SLOWLY THAT OTHER PEOPLE COULD HAVE NOTICED. OR THE OPPOSITE, BEING SO FIGETY OR RESTLESS THAT YOU HAVE BEEN MOVING AROUND A LOT MORE THAN USUAL: NOT AT ALL
SUM OF ALL RESPONSES TO PHQ QUESTIONS 1-9: 1
7. TROUBLE CONCENTRATING ON THINGS, SUCH AS READING THE NEWSPAPER OR WATCHING TELEVISION: NOT AT ALL
9. THOUGHTS THAT YOU WOULD BE BETTER OFF DEAD, OR OF HURTING YOURSELF: NOT AT ALL
1. LITTLE INTEREST OR PLEASURE IN DOING THINGS: NOT AT ALL
2. FEELING DOWN, DEPRESSED OR HOPELESS: NOT AT ALL
3. TROUBLE FALLING OR STAYING ASLEEP: SEVERAL DAYS
SUM OF ALL RESPONSES TO PHQ QUESTIONS 1-9: 1
4. FEELING TIRED OR HAVING LITTLE ENERGY: NOT AT ALL

## 2025-07-17 NOTE — PROGRESS NOTES
Medicare Annual Wellness Visit    Barby Urias is here for Medicare AWV    Assessment & Plan  1. Medicare wellness visit.  - Blood pressure readings today were within the normal range.  - Advised to receive the tetanus vaccine at the pharmacy, as it has been 10 years since her last dose.  - Recommended to get the shingles vaccine and RSV vaccine at the pharmacy.    2. Asthma.  - Reports using her rescue inhaler once when going out in the heat.  - No nighttime cough noted, occasional persistent cough eases with water.  - Advised to use rescue inhaler as needed and cool her room to prevent symptoms.    3. Hypertension.  - Blood pressure readings today were within the normal range.  - Advised to continue current medication regimen.    4. Balance issues.  - Reports falling when making quick turns and difficulty judging distances while walking.  - Physical therapy recommended to improve balance.  - Advised to work on balance exercises and consider returning to physical therapy at a convenient location.    Follow-up  The patient will follow up in 4 months.    Medicare annual wellness visit, subsequent  Primary hypertension  -     enalapril-hydroCHLOROthiazide (VASERETIC) 10-25 MG per tablet; Take 1 tablet by mouth daily, Disp-90 tablet, R-0Normal  At moderate risk for fall  -     [facility], Marcelina Munoz    Results       Return for Medicare Annual Wellness Visit in 1 year.     Subjective     History of Present Illness  The patient presents for her Medicare wellness visit.    She has been managing her heat intolerance by staying indoors and using fans. She avoids outdoor activities due to the high temperatures. She is considering joining a gym with her daughter for exercise. She uses her rescue inhaler sparingly, only once when she ventured outside. She experiences occasional coughing at night, which she manages by drinking water. An oscillating fan is used to cool her room.    She has been

## 2025-07-28 ENCOUNTER — HOSPITAL ENCOUNTER (OUTPATIENT)
Dept: PHYSICAL THERAPY | Age: 70
Setting detail: THERAPIES SERIES
Discharge: HOME | End: 2025-07-28
Payer: MEDICARE

## 2025-07-28 PROCEDURE — 97161 PT EVAL LOW COMPLEX 20 MIN: CPT | Performed by: PHYSICAL THERAPIST

## 2025-07-28 NOTE — PROGRESS NOTES
[facility]                Phone: [phone]   Fax: [phone]    Physical Therapy Daily Treatment Note  Date:  2025    Patient Name:  Barby Urias    :  1955  MRN: [id number]    Evaluating therapist:  LUCRETIA Bianchi     (25)  Restrictions/Precautions:    Diagnosis:  falls risk  Treatment Diagnosis:    Insurance/Certification information:  [facility] Medicare        cert dates:  25 to 10/28/25          ICD-10:  Z91.81  Referring Physician:  TIMBO Ayala   Plan of care signed (Y/N):  Y  Visit# / total visits:    Pain level: 0/10   Time In:  Time Out:    Subjective:      Exercises:  Exercise/Equipment Resistance/Repetitions Other comments   seated hip add                         abd                       flex                 knee ext            Total Gym squats            toe raises     step ups             side              tandem amb     side/side amb     foam marching             start/stops     quick turns                     Other Therapeutic Activities:      Home Exercise Program:  provided 25    Manual Treatments:      Modalities:      Timed Code Treatment Minutes:      Total Treatment Minutes:      Treatment/Activity Tolerance:  [] Patient tolerated treatment well [] Patient limited by fatique  [] Patient limited by pain  [] Patient limited by other medical complications  [] Other:     Prognosis: [] Good [] Fair  [] Poor    Patient Requires Follow-up: [] Yes  [] No    Plan:   [] Continue per plan of care [] Alter current plan (see comments)  [] Plan of care initiated [] Hold pending MD visit [] Discharge  Plan for Next Session:      See Weekly Progress Note: []  Yes  []  No  Next due:        Electronically signed by:  Jethro Miranda, MICHELE
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
## 2025-07-31 ENCOUNTER — HOSPITAL ENCOUNTER (OUTPATIENT)
Dept: PHYSICAL THERAPY | Age: 70
Setting detail: THERAPIES SERIES
Discharge: HOME | End: 2025-07-31
Payer: MEDICARE

## 2025-07-31 PROCEDURE — 97110 THERAPEUTIC EXERCISES: CPT | Performed by: PHYSICAL THERAPIST

## 2025-07-31 PROCEDURE — 97530 THERAPEUTIC ACTIVITIES: CPT | Performed by: PHYSICAL THERAPIST

## 2025-07-31 NOTE — PROGRESS NOTES
[facility]                Phone: [phone]   Fax: [phone]    Physical Therapy Daily Treatment Note  Date:  2025    Patient Name:  Barby Urias    :  1955  MRN: [id number]    Evaluating therapist:  LUCRETIA Bianchi     (25)  Restrictions/Precautions:    Diagnosis:  falls risk  Treatment Diagnosis:    Insurance/Certification information:  [facility] Medicare        cert dates:  25 to 10/28/25          ICD-10:  Z91.81  Referring Physician:  TIMBO Ayala   Plan of care signed (Y/N):  Y  Visit# / total visits:  2/6  Pain level: 0/10   Time In:  900  Time Out:  954    Subjective:      Exercises:  Exercise/Equipment Resistance/Repetitions Other comments   seated hip add   [id number]                      abd [id number]                      flex [id number]                knee ext [id number]           Total Gym squats 2x10         L10           toe raises 2x15    step ups 2x10x6\"            side  2x10x6\"            tandem amb 2 min     side/side amb 2 min     foam marching  3x30s           start/stops 4x50'    quick turns 4x50'                    Other Therapeutic Activities:      Home Exercise Program:  provided 25    Manual Treatments:      Modalities:      Timed Code Treatment Minutes:      Total Treatment Minutes:      Treatment/Activity Tolerance:  [] Patient tolerated treatment well [] Patient limited by fatique  [] Patient limited by pain  [] Patient limited by other medical complications  [] Other:     Prognosis: [] Good [] Fair  [] Poor    Patient Requires Follow-up: [] Yes  [] No    Plan:   [] Continue per plan of care [] Alter current plan (see comments)  [] Plan of care initiated [] Hold pending MD visit [] Discharge  Plan for Next Session:      See Weekly Progress Note: []  Yes  []  No  Next due:        Electronically signed by:  Jethro Miranda PT

## 2025-08-12 ENCOUNTER — HOSPITAL ENCOUNTER (OUTPATIENT)
Dept: PHYSICAL THERAPY | Age: 70
Setting detail: THERAPIES SERIES
Discharge: HOME | End: 2025-08-12
Payer: MEDICARE

## 2025-08-12 PROCEDURE — 97530 THERAPEUTIC ACTIVITIES: CPT | Performed by: PHYSICAL THERAPIST

## 2025-08-12 PROCEDURE — 97110 THERAPEUTIC EXERCISES: CPT | Performed by: PHYSICAL THERAPIST

## 2025-08-14 ENCOUNTER — HOSPITAL ENCOUNTER (OUTPATIENT)
Dept: PHYSICAL THERAPY | Age: 70
Setting detail: THERAPIES SERIES
Discharge: HOME | End: 2025-08-14
Payer: MEDICARE

## 2025-08-14 PROCEDURE — 97110 THERAPEUTIC EXERCISES: CPT | Performed by: PHYSICAL THERAPIST

## 2025-08-14 PROCEDURE — 97530 THERAPEUTIC ACTIVITIES: CPT | Performed by: PHYSICAL THERAPIST

## 2025-08-19 ENCOUNTER — HOSPITAL ENCOUNTER (OUTPATIENT)
Dept: PHYSICAL THERAPY | Age: 70
Setting detail: THERAPIES SERIES
Discharge: HOME | End: 2025-08-19
Payer: MEDICARE

## 2025-08-19 PROCEDURE — 97110 THERAPEUTIC EXERCISES: CPT | Performed by: PHYSICAL THERAPIST

## 2025-08-19 PROCEDURE — 97530 THERAPEUTIC ACTIVITIES: CPT | Performed by: PHYSICAL THERAPIST

## 2025-08-21 ENCOUNTER — HOSPITAL ENCOUNTER (OUTPATIENT)
Dept: PHYSICAL THERAPY | Age: 70
Setting detail: THERAPIES SERIES
Discharge: HOME | End: 2025-08-21
Payer: MEDICARE

## 2025-08-21 PROCEDURE — 97110 THERAPEUTIC EXERCISES: CPT | Performed by: PHYSICAL THERAPIST

## 2025-08-21 PROCEDURE — 97530 THERAPEUTIC ACTIVITIES: CPT | Performed by: PHYSICAL THERAPIST
